# Patient Record
Sex: MALE | Race: WHITE | Employment: FULL TIME | URBAN - METROPOLITAN AREA
[De-identification: names, ages, dates, MRNs, and addresses within clinical notes are randomized per-mention and may not be internally consistent; named-entity substitution may affect disease eponyms.]

---

## 2017-03-03 ENCOUNTER — APPOINTMENT (OUTPATIENT)
Dept: GENERAL RADIOLOGY | Facility: HOSPITAL | Age: 47
End: 2017-03-03
Attending: EMERGENCY MEDICINE
Payer: COMMERCIAL

## 2017-03-03 ENCOUNTER — APPOINTMENT (OUTPATIENT)
Dept: CT IMAGING | Facility: HOSPITAL | Age: 47
End: 2017-03-03
Attending: EMERGENCY MEDICINE
Payer: COMMERCIAL

## 2017-03-03 ENCOUNTER — HOSPITAL ENCOUNTER (EMERGENCY)
Facility: HOSPITAL | Age: 47
Discharge: HOME | End: 2017-03-03
Attending: EMERGENCY MEDICINE
Payer: COMMERCIAL

## 2017-03-03 VITALS
BODY MASS INDEX: 30.8 KG/M2 | SYSTOLIC BLOOD PRESSURE: 122 MMHG | RESPIRATION RATE: 18 BRPM | DIASTOLIC BLOOD PRESSURE: 67 MMHG | WEIGHT: 220 LBS | TEMPERATURE: 99 F | HEIGHT: 71 IN | HEART RATE: 62 BPM | OXYGEN SATURATION: 96 %

## 2017-03-03 DIAGNOSIS — R10.9 ABDOMINAL PAIN OF UNKNOWN ETIOLOGY: Primary | ICD-10-CM

## 2017-03-03 DIAGNOSIS — K50.00 ILEITIS, TERMINAL, WITHOUT COMPLICATIONS (HCC): ICD-10-CM

## 2017-03-03 LAB
ANION GAP SERPL CALC-SCNC: 7 MMOL/L (ref 0–18)
BACTERIA UR QL AUTO: NEGATIVE /HPF
BASOPHILS # BLD: 0 K/UL (ref 0–0.2)
BASOPHILS NFR BLD: 1 %
BILIRUB UR QL: NEGATIVE
BUN SERPL-MCNC: 19 MG/DL (ref 8–20)
BUN/CREAT SERPL: 15.1 (ref 10–20)
CALCIUM SERPL-MCNC: 10.6 MG/DL (ref 8.5–10.5)
CHLORIDE SERPL-SCNC: 108 MMOL/L (ref 95–110)
CLARITY UR: CLEAR
CO2 SERPL-SCNC: 23 MMOL/L (ref 22–32)
COLOR UR: YELLOW
CREAT SERPL-MCNC: 1.26 MG/DL (ref 0.5–1.5)
EOSINOPHIL # BLD: 0.1 K/UL (ref 0–0.7)
EOSINOPHIL NFR BLD: 1 %
ERYTHROCYTE [DISTWIDTH] IN BLOOD BY AUTOMATED COUNT: 13 % (ref 11–15)
GLUCOSE SERPL-MCNC: 117 MG/DL (ref 70–99)
GLUCOSE UR-MCNC: NEGATIVE MG/DL
HCT VFR BLD AUTO: 45.9 % (ref 41–52)
HGB BLD-MCNC: 15.7 G/DL (ref 13.5–17.5)
HGB UR QL STRIP.AUTO: NEGATIVE
KETONES UR-MCNC: NEGATIVE MG/DL
LEUKOCYTE ESTERASE UR QL STRIP.AUTO: NEGATIVE
LYMPHOCYTES # BLD: 1.9 K/UL (ref 1–4)
LYMPHOCYTES NFR BLD: 21 %
MCH RBC QN AUTO: 29.3 PG (ref 27–32)
MCHC RBC AUTO-ENTMCNC: 34.3 G/DL (ref 32–37)
MCV RBC AUTO: 85.5 FL (ref 80–100)
MONOCYTES # BLD: 0.5 K/UL (ref 0–1)
MONOCYTES NFR BLD: 6 %
NEUTROPHILS # BLD AUTO: 6.5 K/UL (ref 1.8–7.7)
NEUTROPHILS NFR BLD: 72 %
NITRITE UR QL STRIP.AUTO: NEGATIVE
OSMOLALITY UR CALC.SUM OF ELEC: 289 MOSM/KG (ref 275–295)
PH UR: 5 [PH] (ref 5–8)
PLATELET # BLD AUTO: 200 K/UL (ref 140–400)
PMV BLD AUTO: 10.3 FL (ref 7.4–10.3)
POTASSIUM SERPL-SCNC: 4 MMOL/L (ref 3.3–5.1)
PROT UR-MCNC: NEGATIVE MG/DL
RBC # BLD AUTO: 5.37 M/UL (ref 4.5–5.9)
RBC #/AREA URNS AUTO: 3 /HPF
SODIUM SERPL-SCNC: 138 MMOL/L (ref 136–144)
SP GR UR STRIP: 1.02 (ref 1–1.03)
UROBILINOGEN UR STRIP-ACNC: <2
VIT C UR-MCNC: 40 MG/DL
WBC # BLD AUTO: 9 K/UL (ref 4–11)
WBC #/AREA URNS AUTO: 2 /HPF

## 2017-03-03 PROCEDURE — 85025 COMPLETE CBC W/AUTO DIFF WBC: CPT

## 2017-03-03 PROCEDURE — 74000 XR ABDOMEN (1 VIEW) (CPT=74000): CPT

## 2017-03-03 PROCEDURE — 96374 THER/PROPH/DIAG INJ IV PUSH: CPT

## 2017-03-03 PROCEDURE — 80048 BASIC METABOLIC PNL TOTAL CA: CPT

## 2017-03-03 PROCEDURE — 74177 CT ABD & PELVIS W/CONTRAST: CPT

## 2017-03-03 PROCEDURE — 99285 EMERGENCY DEPT VISIT HI MDM: CPT

## 2017-03-03 PROCEDURE — 81003 URINALYSIS AUTO W/O SCOPE: CPT | Performed by: EMERGENCY MEDICINE

## 2017-03-03 PROCEDURE — 96375 TX/PRO/DX INJ NEW DRUG ADDON: CPT

## 2017-03-03 RX ORDER — MORPHINE SULFATE 4 MG/ML
INJECTION, SOLUTION INTRAMUSCULAR; INTRAVENOUS
Status: COMPLETED
Start: 2017-03-03 | End: 2017-03-03

## 2017-03-03 RX ORDER — MORPHINE SULFATE 4 MG/ML
4 INJECTION, SOLUTION INTRAMUSCULAR; INTRAVENOUS ONCE
Status: COMPLETED | OUTPATIENT
Start: 2017-03-03 | End: 2017-03-03

## 2017-03-03 RX ORDER — ONDANSETRON 2 MG/ML
4 INJECTION INTRAMUSCULAR; INTRAVENOUS ONCE
Status: COMPLETED | OUTPATIENT
Start: 2017-03-03 | End: 2017-03-03

## 2017-03-03 RX ORDER — KETOROLAC TROMETHAMINE 30 MG/ML
15 INJECTION, SOLUTION INTRAMUSCULAR; INTRAVENOUS ONCE
Status: COMPLETED | OUTPATIENT
Start: 2017-03-03 | End: 2017-03-03

## 2017-03-03 RX ORDER — KETOROLAC TROMETHAMINE 30 MG/ML
INJECTION, SOLUTION INTRAMUSCULAR; INTRAVENOUS
Status: COMPLETED
Start: 2017-03-03 | End: 2017-03-03

## 2017-03-03 NOTE — ED PROVIDER NOTES
Patient Seen in: [facility] AND [facility] Emergency Department    History   Patient presents with:  Stomach Pain    Stated Complaint: stomach cramping since 1900     HPI    Patient is a 42-year-old male who awoke during the night with severe crampy abdominal pa Constitutional and vital signs reviewed. All other systems reviewed and negative except as noted above. PSFH elements reviewed from today and agreed except as otherwise stated in HPI.     Physical Exam     ED Triage Vitals   BP 03/03/17 0532 133/90 ---------                               -----------         ------                     CBC W/ DIFFERENTIAL[[id number]]                              Final result                 Please view results for these tests on the individual orders.    RAINBOW DRAW AMANDA

## 2017-03-03 NOTE — ED NOTES
Pt states his pain is better, waiting for MD up-date. Pt is awake and alert, family is at the bedside for emotional support.

## 2017-03-03 NOTE — ED NOTES
Patient discharged, patient instructed to follow up with the doctor, return if worse. Pt ambulate with steady gait.

## 2017-03-17 ENCOUNTER — OFFICE VISIT (OUTPATIENT)
Dept: FAMILY MEDICINE CLINIC | Facility: CLINIC | Age: 47
End: 2017-03-17

## 2017-03-17 VITALS
TEMPERATURE: 98 F | SYSTOLIC BLOOD PRESSURE: 133 MMHG | WEIGHT: 217 LBS | DIASTOLIC BLOOD PRESSURE: 86 MMHG | BODY MASS INDEX: 30 KG/M2 | HEART RATE: 80 BPM

## 2017-03-17 DIAGNOSIS — K52.9 COLITIS: Primary | ICD-10-CM

## 2017-03-17 PROCEDURE — 99212 OFFICE O/P EST SF 10 MIN: CPT | Performed by: FAMILY MEDICINE

## 2017-03-17 PROCEDURE — 99214 OFFICE O/P EST MOD 30 MIN: CPT | Performed by: FAMILY MEDICINE

## 2017-03-17 NOTE — PROGRESS NOTES
HPI:    Patient ID: Dae Mcneil is a 51year old male. HPI   Patient presents with:  ER F/U: f/u [facility] 3/3/17-Abdominal pain, Ileitis, terminal- Review CT scan    Review of Systems   Constitutional: Negative.     Gastrointestinal: Positive for abdom

## 2017-03-28 ENCOUNTER — OFFICE VISIT (OUTPATIENT)
Dept: FAMILY MEDICINE CLINIC | Facility: CLINIC | Age: 47
End: 2017-03-28

## 2017-03-28 VITALS
SYSTOLIC BLOOD PRESSURE: 125 MMHG | HEART RATE: 66 BPM | DIASTOLIC BLOOD PRESSURE: 85 MMHG | TEMPERATURE: 98 F | BODY MASS INDEX: 30 KG/M2 | WEIGHT: 217 LBS

## 2017-03-28 DIAGNOSIS — L02.01 ABSCESS OF FACE: Primary | ICD-10-CM

## 2017-03-28 PROCEDURE — 99212 OFFICE O/P EST SF 10 MIN: CPT | Performed by: FAMILY MEDICINE

## 2017-03-28 PROCEDURE — 99213 OFFICE O/P EST LOW 20 MIN: CPT | Performed by: FAMILY MEDICINE

## 2017-03-28 RX ORDER — CLINDAMYCIN HYDROCHLORIDE 300 MG/1
300 CAPSULE ORAL 3 TIMES DAILY
Qty: 21 CAPSULE | Refills: 0 | Status: SHIPPED | OUTPATIENT
Start: 2017-03-28 | End: 2017-07-12 | Stop reason: ALTCHOICE

## 2017-03-28 NOTE — PROGRESS NOTES
HPI:    Patient ID: Nghia Best is a 51year old male. HPI  Patient presents with:  Lump: lump on bottom of chin  some discharge. 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 Review of Systems   Constitutional: Negative. Skin: Positive for wound.         Inferior ch

## 2017-07-12 ENCOUNTER — TELEPHONE (OUTPATIENT)
Dept: GASTROENTEROLOGY | Facility: CLINIC | Age: 47
End: 2017-07-12

## 2017-07-12 ENCOUNTER — OFFICE VISIT (OUTPATIENT)
Dept: GASTROENTEROLOGY | Facility: CLINIC | Age: 47
End: 2017-07-12

## 2017-07-12 VITALS
HEART RATE: 58 BPM | WEIGHT: 218 LBS | BODY MASS INDEX: 30.52 KG/M2 | HEIGHT: 71 IN | SYSTOLIC BLOOD PRESSURE: 126 MMHG | DIASTOLIC BLOOD PRESSURE: 79 MMHG

## 2017-07-12 DIAGNOSIS — R93.3 ABNORMAL CT SCAN, GASTROINTESTINAL TRACT: ICD-10-CM

## 2017-07-12 DIAGNOSIS — R10.30 LOWER ABDOMINAL PAIN: Primary | ICD-10-CM

## 2017-07-12 PROCEDURE — 99244 OFF/OP CNSLTJ NEW/EST MOD 40: CPT | Performed by: INTERNAL MEDICINE

## 2017-07-12 PROCEDURE — 99212 OFFICE O/P EST SF 10 MIN: CPT | Performed by: INTERNAL MEDICINE

## 2017-07-12 NOTE — TELEPHONE ENCOUNTER
Scheduled for:  Colon/EGD   Provider Name: Dr. Cat Mijares  Date:  9-22-17  Location:  [facility]  Sedation:  MAC  Time:  7:30am, arrival 6:30am  Prep: Suprep   Meds/Allergies Reconciled?:  Yes   Diagnosis with codes:  Abdominal pain R10.9, abnormal CT scan R93.8  Was

## 2017-07-12 NOTE — PROGRESS NOTES
Stefan Lion is a 51year old male. HPI:   Patient presents with:  Abdominal Pain: a few months back had to go to ER for the third time       The patient is a 26-year-old male who presents for evaluation of abdominal pain and abnormal CT scan.   The pa (cause of death, age 46)   • Heart Disease Maternal Uncle      CAD      Social History: Smoking status: Current Every Day Smoker                                                   Packs/day: 0.50      Years: 20.00        Types: Cigarettes  Smokeless tobacco evaluation were reviewed. I have advised colonoscopy and EGD. Risks and benefits the procedure outlined with the patient including risk of perforation, bleeding, missed lesion and medication reaction. Suprep or Colyte bowel preparation and MAC sedation.

## 2017-09-20 ENCOUNTER — HOSPITAL ENCOUNTER (EMERGENCY)
Facility: HOSPITAL | Age: 47
Discharge: HOME | End: 2017-09-20
Attending: EMERGENCY MEDICINE
Payer: COMMERCIAL

## 2017-09-20 ENCOUNTER — NURSE TRIAGE (OUTPATIENT)
Dept: OTHER | Age: 47
End: 2017-09-20

## 2017-09-20 ENCOUNTER — APPOINTMENT (OUTPATIENT)
Dept: CT IMAGING | Facility: HOSPITAL | Age: 47
End: 2017-09-20
Attending: EMERGENCY MEDICINE
Payer: COMMERCIAL

## 2017-09-20 VITALS
HEART RATE: 58 BPM | SYSTOLIC BLOOD PRESSURE: 129 MMHG | DIASTOLIC BLOOD PRESSURE: 78 MMHG | TEMPERATURE: 98 F | RESPIRATION RATE: 18 BRPM | WEIGHT: 215 LBS | BODY MASS INDEX: 30.1 KG/M2 | HEIGHT: 71 IN | OXYGEN SATURATION: 99 %

## 2017-09-20 DIAGNOSIS — M54.50 ACUTE LEFT-SIDED LOW BACK PAIN WITHOUT SCIATICA: Primary | ICD-10-CM

## 2017-09-20 DIAGNOSIS — R10.32 ABDOMINAL PAIN, LEFT LOWER QUADRANT: ICD-10-CM

## 2017-09-20 LAB
ANION GAP SERPL CALC-SCNC: 5 MMOL/L (ref 0–18)
BASOPHILS # BLD: 0 K/UL (ref 0–0.2)
BASOPHILS NFR BLD: 0 %
BILIRUB UR QL: NEGATIVE
BUN SERPL-MCNC: 16 MG/DL (ref 8–20)
BUN/CREAT SERPL: 14.3 (ref 10–20)
CALCIUM SERPL-MCNC: 9.2 MG/DL (ref 8.5–10.5)
CHLORIDE SERPL-SCNC: 107 MMOL/L (ref 95–110)
CLARITY UR: CLEAR
CO2 SERPL-SCNC: 24 MMOL/L (ref 22–32)
COLOR UR: YELLOW
CREAT SERPL-MCNC: 1.12 MG/DL (ref 0.5–1.5)
EOSINOPHIL # BLD: 0 K/UL (ref 0–0.7)
EOSINOPHIL NFR BLD: 1 %
ERYTHROCYTE [DISTWIDTH] IN BLOOD BY AUTOMATED COUNT: 12.8 % (ref 11–15)
GLUCOSE SERPL-MCNC: 97 MG/DL (ref 70–99)
GLUCOSE UR-MCNC: NEGATIVE MG/DL
HCT VFR BLD AUTO: 39.7 % (ref 41–52)
HGB BLD-MCNC: 13.7 G/DL (ref 13.5–17.5)
HGB UR QL STRIP.AUTO: NEGATIVE
KETONES UR-MCNC: NEGATIVE MG/DL
LEUKOCYTE ESTERASE UR QL STRIP.AUTO: NEGATIVE
LYMPHOCYTES # BLD: 1.2 K/UL (ref 1–4)
LYMPHOCYTES NFR BLD: 18 %
MCH RBC QN AUTO: 29.8 PG (ref 27–32)
MCHC RBC AUTO-ENTMCNC: 34.6 G/DL (ref 32–37)
MCV RBC AUTO: 86.2 FL (ref 80–100)
MONOCYTES # BLD: 0.3 K/UL (ref 0–1)
MONOCYTES NFR BLD: 4 %
NEUTROPHILS # BLD AUTO: 4.9 K/UL (ref 1.8–7.7)
NEUTROPHILS NFR BLD: 77 %
NITRITE UR QL STRIP.AUTO: NEGATIVE
OSMOLALITY UR CALC.SUM OF ELEC: 283 MOSM/KG (ref 275–295)
PH UR: 6 [PH] (ref 5–8)
PLATELET # BLD AUTO: 184 K/UL (ref 140–400)
PMV BLD AUTO: 9.4 FL (ref 7.4–10.3)
POTASSIUM SERPL-SCNC: 4.5 MMOL/L (ref 3.3–5.1)
PROT UR-MCNC: NEGATIVE MG/DL
RBC # BLD AUTO: 4.6 M/UL (ref 4.5–5.9)
SODIUM SERPL-SCNC: 136 MMOL/L (ref 136–144)
SP GR UR STRIP: 1 (ref 1–1.03)
UROBILINOGEN UR STRIP-ACNC: <2
VIT C UR-MCNC: NEGATIVE MG/DL
WBC # BLD AUTO: 6.5 K/UL (ref 4–11)

## 2017-09-20 PROCEDURE — 81003 URINALYSIS AUTO W/O SCOPE: CPT | Performed by: EMERGENCY MEDICINE

## 2017-09-20 PROCEDURE — 96376 TX/PRO/DX INJ SAME DRUG ADON: CPT

## 2017-09-20 PROCEDURE — 85025 COMPLETE CBC W/AUTO DIFF WBC: CPT

## 2017-09-20 PROCEDURE — 96374 THER/PROPH/DIAG INJ IV PUSH: CPT

## 2017-09-20 PROCEDURE — 80048 BASIC METABOLIC PNL TOTAL CA: CPT | Performed by: EMERGENCY MEDICINE

## 2017-09-20 PROCEDURE — 74177 CT ABD & PELVIS W/CONTRAST: CPT | Performed by: EMERGENCY MEDICINE

## 2017-09-20 PROCEDURE — 80048 BASIC METABOLIC PNL TOTAL CA: CPT

## 2017-09-20 PROCEDURE — 85025 COMPLETE CBC W/AUTO DIFF WBC: CPT | Performed by: EMERGENCY MEDICINE

## 2017-09-20 PROCEDURE — 99285 EMERGENCY DEPT VISIT HI MDM: CPT

## 2017-09-20 PROCEDURE — 96361 HYDRATE IV INFUSION ADD-ON: CPT

## 2017-09-20 RX ORDER — HYDROMORPHONE HYDROCHLORIDE 1 MG/ML
0.5 INJECTION, SOLUTION INTRAMUSCULAR; INTRAVENOUS; SUBCUTANEOUS ONCE
Status: COMPLETED | OUTPATIENT
Start: 2017-09-20 | End: 2017-09-20

## 2017-09-20 NOTE — ED NOTES
Patient given discharge papers, states he was angry \"No one is doing anything about my back, I have not had any images of my back done and Im in a lot of pain. \" Dr. Treva Browning made aware and spoke with patient.

## 2017-09-20 NOTE — ED NOTES
Patient complains left flank pain that radiates down to left groin area that started Friday 09/15/17. Denies N/V/D or constipation. Per pt, standing improves pain, sitting down amplifies it. Denies fever. Denies blood in urine or stool.  Denies any other co

## 2017-09-20 NOTE — ED PROVIDER NOTES
Patient Seen in: [facility] AND [facility] Emergency Department    History   Patient presents with:  Abdomen/Flank Pain (GI/)    Stated Complaint: left lower abdominal pain    HPI    65-year-old male with history of hypertension and hyperlipidemia presents wit and vital signs reviewed. All other systems reviewed and negative except as noted above. PSFH elements reviewed from today and agreed except as otherwise stated in HPI.     Physical Exam   ED Triage Vitals [09/20/17 1230]  BP: 139/76  Pulse: 73  Res CBC W/ DIFFERENTIAL[[id number]]          Abnormal            Final result                 Please view results for these tests on the individual orders.    URINALYSIS WITH CULTURE REFLEX   RAINBOW DRAW BLUE   RAINBOW LIME GREEN   RAINBOW DRAW GOLD

## 2017-09-20 NOTE — TELEPHONE ENCOUNTER
Patient went to [facility] ER today for low back pain, but was not prescribed anything for pain. Spouse wanted to make an appointment with Dr Geanie Cooks for tomorrow to follow-up. No appointments available with Dr Geanie Cooks tomorrow.  Appointment made for tomorrow at

## 2017-09-20 NOTE — ED INITIAL ASSESSMENT (HPI)
Pt reports llq pain since Friday and states that he has a colonoscopy for the 22nd and cannot tolerate the pain.  Last b,m was today and normal and no issues with urination

## 2017-09-20 NOTE — ED NOTES
Per pt, has hx of spinal fusion November 2016 at [facility]. Denies trauma/fall. Patient states that pain also radiates down left leg. Denies numbness and tingling.

## 2017-09-21 ENCOUNTER — OFFICE VISIT (OUTPATIENT)
Dept: FAMILY MEDICINE CLINIC | Facility: CLINIC | Age: 47
End: 2017-09-21

## 2017-09-21 VITALS
TEMPERATURE: 98 F | HEART RATE: 70 BPM | RESPIRATION RATE: 18 BRPM | DIASTOLIC BLOOD PRESSURE: 86 MMHG | SYSTOLIC BLOOD PRESSURE: 133 MMHG | HEIGHT: 71 IN | WEIGHT: 212 LBS | BODY MASS INDEX: 29.68 KG/M2

## 2017-09-21 DIAGNOSIS — M54.32 SCIATICA OF LEFT SIDE: ICD-10-CM

## 2017-09-21 DIAGNOSIS — M54.50 DORSALGIA OF LUMBAR REGION: ICD-10-CM

## 2017-09-21 PROCEDURE — 99213 OFFICE O/P EST LOW 20 MIN: CPT | Performed by: FAMILY MEDICINE

## 2017-09-21 PROCEDURE — 99212 OFFICE O/P EST SF 10 MIN: CPT | Performed by: FAMILY MEDICINE

## 2017-09-21 RX ORDER — FENOFIBRATE 145 MG/1
145 TABLET, COATED ORAL
COMMUNITY
Start: 2017-08-26 | End: 2017-09-21

## 2017-09-21 RX ORDER — LISINOPRIL 10 MG/1
10 TABLET ORAL
COMMUNITY
End: 2017-09-21

## 2017-09-21 RX ORDER — HYDROCODONE BITARTRATE AND ACETAMINOPHEN 5; 325 MG/1; MG/1
1 TABLET ORAL EVERY 6 HOURS PRN
Qty: 30 TABLET | Refills: 0 | Status: SHIPPED | OUTPATIENT
Start: 2017-09-21 | End: 2017-10-09

## 2017-09-21 RX ORDER — METHYLPREDNISOLONE 4 MG/1
TABLET ORAL
Qty: 1 KIT | Refills: 0 | Status: SHIPPED | OUTPATIENT
Start: 2017-09-21 | End: 2017-10-09

## 2017-09-21 NOTE — PROGRESS NOTES
HPI:    Patient ID: Bossman Garcia is a 51year old male. Pt presents for follow up from the ER for low back pain and groin pain. Pt has hx of back surgery. Was diagnosed with sciactica. Pt had CT scan which was unremarkable.  Patient states symptoms are NextGen:  \"arthroscopy x3 necrotizing                infection right knee\"       Family History  Problem Relation Age of Onset  • Colon Cancer Maternal Grandmother 48  • Diabetes Maternal Uncle    • Heart Disease Maternal Aunt        CAD, (cause of death region.   Psychiatric: patient is oriented X 3, there is no agitation     DIFFERENTIAL DIAGNOSIS: After history and physical exam differential diagnosis was considered for ureteral stone, diverticulitis, urinary tract infection, muscular skeletal pain       drive on medication. Disp: 30 tablet Rfl: 0   methylPREDNISolone (MEDROL) 4 MG Oral Tablet Therapy Pack As directed.  Disp: 1 kit Rfl: 0   Na Sulfate-K Sulfate-Mg Sulf (SUPREP BOWEL PREP KIT) 17.5-3.13-1.6 GM/180ML Oral Solution Take as directed Disp: 1 Bot

## 2017-09-22 ENCOUNTER — ANESTHESIA (OUTPATIENT)
Dept: ENDOSCOPY | Facility: HOSPITAL | Age: 47
End: 2017-09-22
Payer: COMMERCIAL

## 2017-09-22 ENCOUNTER — SURGERY (OUTPATIENT)
Age: 47
End: 2017-09-22

## 2017-09-22 ENCOUNTER — ANESTHESIA EVENT (OUTPATIENT)
Dept: ENDOSCOPY | Facility: HOSPITAL | Age: 47
End: 2017-09-22
Payer: COMMERCIAL

## 2017-09-22 ENCOUNTER — HOSPITAL ENCOUNTER (OUTPATIENT)
Facility: HOSPITAL | Age: 47
Setting detail: HOSPITAL OUTPATIENT SURGERY
Discharge: HOME | End: 2017-09-22
Attending: INTERNAL MEDICINE | Admitting: INTERNAL MEDICINE
Payer: COMMERCIAL

## 2017-09-22 VITALS
HEART RATE: 58 BPM | DIASTOLIC BLOOD PRESSURE: 81 MMHG | WEIGHT: 215 LBS | BODY MASS INDEX: 30.1 KG/M2 | HEIGHT: 71 IN | RESPIRATION RATE: 15 BRPM | SYSTOLIC BLOOD PRESSURE: 115 MMHG | OXYGEN SATURATION: 97 %

## 2017-09-22 DIAGNOSIS — R10.9 ABDOMINAL PAIN: ICD-10-CM

## 2017-09-22 DIAGNOSIS — R93.89 ABNORMAL CT SCAN: ICD-10-CM

## 2017-09-22 DIAGNOSIS — K44.9 HIATAL HERNIA: ICD-10-CM

## 2017-09-22 DIAGNOSIS — K64.9 HEMORRHOIDS, UNSPECIFIED HEMORRHOID TYPE: Primary | ICD-10-CM

## 2017-09-22 DIAGNOSIS — K63.5 POLYP OF COLON, UNSPECIFIED PART OF COLON, UNSPECIFIED TYPE: ICD-10-CM

## 2017-09-22 PROCEDURE — 0DB98ZX EXCISION OF DUODENUM, VIA NATURAL OR ARTIFICIAL OPENING ENDOSCOPIC, DIAGNOSTIC: ICD-10-PCS | Performed by: INTERNAL MEDICINE

## 2017-09-22 PROCEDURE — 45385 COLONOSCOPY W/LESION REMOVAL: CPT | Performed by: INTERNAL MEDICINE

## 2017-09-22 PROCEDURE — 0DBH8ZX EXCISION OF CECUM, VIA NATURAL OR ARTIFICIAL OPENING ENDOSCOPIC, DIAGNOSTIC: ICD-10-PCS | Performed by: INTERNAL MEDICINE

## 2017-09-22 PROCEDURE — 0DB38ZX EXCISION OF LOWER ESOPHAGUS, VIA NATURAL OR ARTIFICIAL OPENING ENDOSCOPIC, DIAGNOSTIC: ICD-10-PCS | Performed by: INTERNAL MEDICINE

## 2017-09-22 PROCEDURE — 0DBN8ZX EXCISION OF SIGMOID COLON, VIA NATURAL OR ARTIFICIAL OPENING ENDOSCOPIC, DIAGNOSTIC: ICD-10-PCS | Performed by: INTERNAL MEDICINE

## 2017-09-22 PROCEDURE — 43239 EGD BIOPSY SINGLE/MULTIPLE: CPT | Performed by: INTERNAL MEDICINE

## 2017-09-22 RX ORDER — MIDAZOLAM HYDROCHLORIDE 1 MG/ML
INJECTION INTRAMUSCULAR; INTRAVENOUS AS NEEDED
Status: DISCONTINUED | OUTPATIENT
Start: 2017-09-22 | End: 2017-09-22 | Stop reason: SURG

## 2017-09-22 RX ORDER — SODIUM CHLORIDE, SODIUM LACTATE, POTASSIUM CHLORIDE, CALCIUM CHLORIDE 600; 310; 30; 20 MG/100ML; MG/100ML; MG/100ML; MG/100ML
INJECTION, SOLUTION INTRAVENOUS CONTINUOUS
Status: DISCONTINUED | OUTPATIENT
Start: 2017-09-22 | End: 2017-09-22

## 2017-09-22 RX ORDER — NALOXONE HYDROCHLORIDE 0.4 MG/ML
80 INJECTION, SOLUTION INTRAMUSCULAR; INTRAVENOUS; SUBCUTANEOUS AS NEEDED
Status: DISCONTINUED | OUTPATIENT
Start: 2017-09-22 | End: 2017-09-22

## 2017-09-22 RX ORDER — LIDOCAINE HYDROCHLORIDE 10 MG/ML
INJECTION, SOLUTION EPIDURAL; INFILTRATION; INTRACAUDAL; PERINEURAL AS NEEDED
Status: DISCONTINUED | OUTPATIENT
Start: 2017-09-22 | End: 2017-09-22 | Stop reason: SURG

## 2017-09-22 RX ADMIN — MIDAZOLAM HYDROCHLORIDE 2 MG: 1 INJECTION INTRAMUSCULAR; INTRAVENOUS at 07:34:00

## 2017-09-22 RX ADMIN — LIDOCAINE HYDROCHLORIDE 50 MG: 10 INJECTION, SOLUTION EPIDURAL; INFILTRATION; INTRACAUDAL; PERINEURAL at 07:34:00

## 2017-09-22 RX ADMIN — SODIUM CHLORIDE, SODIUM LACTATE, POTASSIUM CHLORIDE, CALCIUM CHLORIDE: 600; 310; 30; 20 INJECTION, SOLUTION INTRAVENOUS at 08:07:00

## 2017-09-22 RX ADMIN — SODIUM CHLORIDE, SODIUM LACTATE, POTASSIUM CHLORIDE, CALCIUM CHLORIDE: 600; 310; 30; 20 INJECTION, SOLUTION INTRAVENOUS at 07:32:00

## 2017-09-22 NOTE — H&P
History & Physical Examination    Patient Name: Maximiliano Yang  MRN: [id number]  [facility]: [id number]  YOB: 1970    Diagnosis: abnormal ct scan, abdominal pain        Prescriptions Prior to Admission:  HYDROcodone-acetaminophen ([address]) 5-325 MG O Heart Disease Maternal Aunt      CAD, (cause of death, age 46)   • Heart Disease Maternal Uncle      CAD        Smoking status: Current Every Day Smoker  0.50 Packs/day  For 20.00 Years     Types: Cigarettes    Smokeless tobacco: Former User    Alcohol use

## 2017-09-22 NOTE — ANESTHESIA POSTPROCEDURE EVALUATION
Patient: Yusuf Montes De Oca    Procedure Summary     Date:  09/22/17 Room / Location:  [address] [place] [address] ENDOSCOPY 01 / [address] [place] [address] ENDOSCOPY    Anesthesia Start:  0732 Anesthesia Stop:      Procedures:       ESOPHAGOGASTRODUODENOSCOPY (EGD) (N/A )      COLONOSCOPY (N/A ) Diagnosis

## 2017-09-22 NOTE — ANESTHESIA PREPROCEDURE EVALUATION
Anesthesia PreOp Note    HPI:     Ahmet Garcia is a 51year old male who presents for preoperative consultation requested by: Elly Rendon MD    Date of Surgery: 9/22/2017    Procedure(s):  ESOPHAGOGASTRODUODENOSCOPY (EGD)  COLONOSCOPY  Indication: 1 Bottle Rfl: 0 9/22/2017 at 0130   Fenofibrate 150 MG Oral Cap Take 1 capsule by mouth once daily. Disp: 90 capsule Rfl: 3 9/21/2017 at 0700   lisinopril 20 MG Oral Tab Take 1 tablet (20 mg total) by mouth once daily.  Disp: 90 tablet Rfl: 3 9/21/2017 at 0 16 09/20/2017   CREATSERUM 1.12 09/20/2017   GLU 97 09/20/2017   CA 9.2 09/20/2017          Vital Signs: Body mass index is 29.99 kg/m². height is 5' 11\" and weight is 215 lb. His blood pressure is 112/73 and his pulse is 73.  His respiration is 16 and

## 2017-09-22 NOTE — OPERATIVE REPORT
CHIARA QUINTANILLA [facility] Endoscopy Report      Preoperative Diagnosis:  - abnormal CT scan  - abdominal pain      Postoperative Diagnosis:  - colon polyps   - internal hemorrhoids  - small hiatal hernia  - reflux esophagitis      Procedure:    Colonoscop analysis. On retroflexed view small internal hemorrhoids were noted. EGD  The esophagus showed mild irregularity of zline c/w esophagitis/reflux. Bx taken.   The GE junction and diaphragmatic impression were at 39 cm and 40 cm for a 1 cm hiatal hernia

## 2017-10-09 ENCOUNTER — OFFICE VISIT (OUTPATIENT)
Dept: FAMILY MEDICINE CLINIC | Facility: CLINIC | Age: 47
End: 2017-10-09

## 2017-10-09 VITALS
HEIGHT: 71 IN | TEMPERATURE: 98 F | RESPIRATION RATE: 18 BRPM | HEART RATE: 69 BPM | BODY MASS INDEX: 29.68 KG/M2 | DIASTOLIC BLOOD PRESSURE: 81 MMHG | SYSTOLIC BLOOD PRESSURE: 126 MMHG | WEIGHT: 212 LBS

## 2017-10-09 DIAGNOSIS — J01.90 ACUTE SINUSITIS, RECURRENCE NOT SPECIFIED, UNSPECIFIED LOCATION: Primary | ICD-10-CM

## 2017-10-09 PROCEDURE — 99212 OFFICE O/P EST SF 10 MIN: CPT | Performed by: FAMILY MEDICINE

## 2017-10-09 PROCEDURE — 99213 OFFICE O/P EST LOW 20 MIN: CPT | Performed by: FAMILY MEDICINE

## 2017-10-09 RX ORDER — AMOXICILLIN AND CLAVULANATE POTASSIUM 875; 125 MG/1; MG/1
1 TABLET, FILM COATED ORAL 2 TIMES DAILY
Qty: 20 TABLET | Refills: 0 | Status: ON HOLD | OUTPATIENT
Start: 2017-10-09 | End: 2017-11-18 | Stop reason: ALTCHOICE

## 2017-10-09 NOTE — PROGRESS NOTES
HPI:    Patient ID: Esme Plata is a 51year old male. Pt presents with cold symptoms for 2 weeks days. Pt has had cough, chest congestion, scratchy sore throat. No fevers. Pt has tried otc remedies without relief. Pt states no sick contacts. Sig: Take 1 tablet by mouth 2 (two) times daily.            Imaging & Referrals:  None       EQ#[id number]

## 2017-10-12 ENCOUNTER — TELEPHONE (OUTPATIENT)
Dept: GASTROENTEROLOGY | Facility: CLINIC | Age: 47
End: 2017-10-12

## 2017-10-12 NOTE — TELEPHONE ENCOUNTER
----- Message from Joesph Cogan, MD sent at 10/11/2017  5:51 PM CDT -----  I wanted to get back to you with your colonoscopy/EGD results. You had 6 colon polyps removed which were benign.   I would advise a repeat colonoscopy in 5 years to make sure no

## 2017-11-18 ENCOUNTER — APPOINTMENT (OUTPATIENT)
Dept: MRI IMAGING | Facility: HOSPITAL | Age: 47
DRG: 473 | End: 2017-11-18
Attending: EMERGENCY MEDICINE
Payer: COMMERCIAL

## 2017-11-18 ENCOUNTER — APPOINTMENT (OUTPATIENT)
Dept: CT IMAGING | Facility: HOSPITAL | Age: 47
DRG: 473 | End: 2017-11-18
Attending: EMERGENCY MEDICINE
Payer: COMMERCIAL

## 2017-11-18 ENCOUNTER — APPOINTMENT (OUTPATIENT)
Dept: GENERAL RADIOLOGY | Facility: HOSPITAL | Age: 47
DRG: 473 | End: 2017-11-18
Attending: EMERGENCY MEDICINE
Payer: COMMERCIAL

## 2017-11-18 ENCOUNTER — HOSPITAL ENCOUNTER (INPATIENT)
Facility: HOSPITAL | Age: 47
LOS: 4 days | Discharge: HOME | DRG: 473 | End: 2017-11-22
Attending: EMERGENCY MEDICINE | Admitting: HOSPITALIST
Payer: COMMERCIAL

## 2017-11-18 DIAGNOSIS — M48.02 CERVICAL STENOSIS OF SPINAL CANAL: ICD-10-CM

## 2017-11-18 DIAGNOSIS — I21.4 NSTEMI (NON-ST ELEVATED MYOCARDIAL INFARCTION) (HCC): Primary | ICD-10-CM

## 2017-11-18 DIAGNOSIS — M54.12 CERVICAL RADICULOPATHY: ICD-10-CM

## 2017-11-18 PROCEDURE — 72141 MRI NECK SPINE W/O DYE: CPT | Performed by: EMERGENCY MEDICINE

## 2017-11-18 PROCEDURE — 99223 1ST HOSP IP/OBS HIGH 75: CPT | Performed by: HOSPITALIST

## 2017-11-18 PROCEDURE — 71010 XR CHEST AP PORTABLE  (CPT=71010): CPT | Performed by: EMERGENCY MEDICINE

## 2017-11-18 PROCEDURE — 74160 CT ABDOMEN W/CONTRAST: CPT | Performed by: EMERGENCY MEDICINE

## 2017-11-18 PROCEDURE — 71260 CT THORAX DX C+: CPT | Performed by: EMERGENCY MEDICINE

## 2017-11-18 RX ORDER — POLYETHYLENE GLYCOL 3350 17 G/17G
17 POWDER, FOR SOLUTION ORAL DAILY PRN
Status: DISCONTINUED | OUTPATIENT
Start: 2017-11-18 | End: 2017-11-22

## 2017-11-18 RX ORDER — HYDROMORPHONE HYDROCHLORIDE 1 MG/ML
0.2 INJECTION, SOLUTION INTRAMUSCULAR; INTRAVENOUS; SUBCUTANEOUS EVERY 2 HOUR PRN
Status: DISCONTINUED | OUTPATIENT
Start: 2017-11-18 | End: 2017-11-22

## 2017-11-18 RX ORDER — LORAZEPAM 2 MG/ML
1 INJECTION INTRAMUSCULAR ONCE
Status: COMPLETED | OUTPATIENT
Start: 2017-11-18 | End: 2017-11-18

## 2017-11-18 RX ORDER — LISINOPRIL 20 MG/1
20 TABLET ORAL
Status: DISCONTINUED | OUTPATIENT
Start: 2017-11-19 | End: 2017-11-22

## 2017-11-18 RX ORDER — HYDROCODONE BITARTRATE AND ACETAMINOPHEN 5; 325 MG/1; MG/1
1 TABLET ORAL EVERY 4 HOURS PRN
Status: DISCONTINUED | OUTPATIENT
Start: 2017-11-18 | End: 2017-11-19

## 2017-11-18 RX ORDER — HYDROMORPHONE HYDROCHLORIDE 1 MG/ML
0.4 INJECTION, SOLUTION INTRAMUSCULAR; INTRAVENOUS; SUBCUTANEOUS EVERY 2 HOUR PRN
Status: DISCONTINUED | OUTPATIENT
Start: 2017-11-18 | End: 2017-11-22

## 2017-11-18 RX ORDER — DIAZEPAM 5 MG/ML
5 INJECTION, SOLUTION INTRAMUSCULAR; INTRAVENOUS ONCE
Status: DISCONTINUED | OUTPATIENT
Start: 2017-11-18 | End: 2017-11-18

## 2017-11-18 RX ORDER — FENOFIBRATE 134 MG/1
134 CAPSULE ORAL
Status: DISCONTINUED | OUTPATIENT
Start: 2017-11-19 | End: 2017-11-22

## 2017-11-18 RX ORDER — DOCUSATE SODIUM 100 MG/1
100 CAPSULE, LIQUID FILLED ORAL 2 TIMES DAILY
Status: DISCONTINUED | OUTPATIENT
Start: 2017-11-18 | End: 2017-11-22

## 2017-11-18 RX ORDER — DEXAMETHASONE SODIUM PHOSPHATE 4 MG/ML
10 VIAL (ML) INJECTION ONCE
Status: COMPLETED | OUTPATIENT
Start: 2017-11-18 | End: 2017-11-18

## 2017-11-18 RX ORDER — KETOROLAC TROMETHAMINE 30 MG/ML
15 INJECTION, SOLUTION INTRAMUSCULAR; INTRAVENOUS ONCE
Status: COMPLETED | OUTPATIENT
Start: 2017-11-18 | End: 2017-11-18

## 2017-11-18 RX ORDER — MORPHINE SULFATE 2 MG/ML
2 INJECTION, SOLUTION INTRAMUSCULAR; INTRAVENOUS EVERY 4 HOURS PRN
Status: DISCONTINUED | OUTPATIENT
Start: 2017-11-18 | End: 2017-11-22

## 2017-11-18 RX ORDER — SODIUM PHOSPHATE, DIBASIC AND SODIUM PHOSPHATE, MONOBASIC 7; 19 G/133ML; G/133ML
1 ENEMA RECTAL ONCE AS NEEDED
Status: DISCONTINUED | OUTPATIENT
Start: 2017-11-18 | End: 2017-11-22

## 2017-11-18 RX ORDER — HYDROMORPHONE HYDROCHLORIDE 1 MG/ML
0.8 INJECTION, SOLUTION INTRAMUSCULAR; INTRAVENOUS; SUBCUTANEOUS EVERY 2 HOUR PRN
Status: DISCONTINUED | OUTPATIENT
Start: 2017-11-18 | End: 2017-11-22

## 2017-11-18 RX ORDER — LORAZEPAM 2 MG/ML
INJECTION INTRAMUSCULAR
Status: COMPLETED
Start: 2017-11-18 | End: 2017-11-18

## 2017-11-18 RX ORDER — DEXAMETHASONE SODIUM PHOSPHATE 4 MG/ML
4 VIAL (ML) INJECTION EVERY 8 HOURS
Status: DISCONTINUED | OUTPATIENT
Start: 2017-11-18 | End: 2017-11-19

## 2017-11-18 RX ORDER — BISACODYL 10 MG
10 SUPPOSITORY, RECTAL RECTAL
Status: DISCONTINUED | OUTPATIENT
Start: 2017-11-18 | End: 2017-11-22

## 2017-11-18 RX ORDER — 0.9 % SODIUM CHLORIDE 0.9 %
VIAL (ML) INJECTION
Status: COMPLETED
Start: 2017-11-18 | End: 2017-11-18

## 2017-11-18 RX ORDER — ASPIRIN 81 MG/1
324 TABLET, CHEWABLE ORAL ONCE
Status: COMPLETED | OUTPATIENT
Start: 2017-11-18 | End: 2017-11-18

## 2017-11-18 RX ORDER — SODIUM CHLORIDE 0.9 % (FLUSH) 0.9 %
3 SYRINGE (ML) INJECTION AS NEEDED
Status: DISCONTINUED | OUTPATIENT
Start: 2017-11-18 | End: 2017-11-22

## 2017-11-18 RX ORDER — ORPHENADRINE CITRATE 30 MG/ML
30 INJECTION INTRAMUSCULAR; INTRAVENOUS EVERY 12 HOURS
Status: DISCONTINUED | OUTPATIENT
Start: 2017-11-18 | End: 2017-11-22

## 2017-11-18 RX ORDER — KETOROLAC TROMETHAMINE 30 MG/ML
INJECTION, SOLUTION INTRAMUSCULAR; INTRAVENOUS
Status: DISPENSED
Start: 2017-11-18 | End: 2017-11-18

## 2017-11-18 RX ORDER — MORPHINE SULFATE 4 MG/ML
INJECTION, SOLUTION INTRAMUSCULAR; INTRAVENOUS
Status: COMPLETED
Start: 2017-11-18 | End: 2017-11-18

## 2017-11-18 RX ORDER — ONDANSETRON 2 MG/ML
4 INJECTION INTRAMUSCULAR; INTRAVENOUS EVERY 6 HOURS PRN
Status: DISCONTINUED | OUTPATIENT
Start: 2017-11-18 | End: 2017-11-22

## 2017-11-18 RX ORDER — DEXAMETHASONE SODIUM PHOSPHATE 10 MG/ML
INJECTION, SOLUTION INTRAMUSCULAR; INTRAVENOUS
Status: DISPENSED
Start: 2017-11-18 | End: 2017-11-18

## 2017-11-18 RX ORDER — ACETAMINOPHEN 325 MG/1
650 TABLET ORAL EVERY 4 HOURS PRN
Status: DISCONTINUED | OUTPATIENT
Start: 2017-11-18 | End: 2017-11-19

## 2017-11-18 RX ORDER — HYDROCODONE BITARTRATE AND ACETAMINOPHEN 5; 325 MG/1; MG/1
2 TABLET ORAL EVERY 4 HOURS PRN
Status: DISCONTINUED | OUTPATIENT
Start: 2017-11-18 | End: 2017-11-19

## 2017-11-18 RX ORDER — 0.9 % SODIUM CHLORIDE 0.9 %
VIAL (ML) INJECTION
Status: DISPENSED
Start: 2017-11-18 | End: 2017-11-19

## 2017-11-18 RX ORDER — MORPHINE SULFATE 4 MG/ML
4 INJECTION, SOLUTION INTRAMUSCULAR; INTRAVENOUS ONCE
Status: COMPLETED | OUTPATIENT
Start: 2017-11-18 | End: 2017-11-18

## 2017-11-18 NOTE — PLAN OF CARE
Patient/Family Goals    • Patient/Family Long Term Goal Progressing    • Patient/Family Short Term Goal Progressing          A/Ox3. C/O pain in neck, shoulder, and arm. Morphine 2mg PRN as needed for pain. VSS. Family at bedside.  Education provided about o

## 2017-11-18 NOTE — ED PROVIDER NOTES
Patient Seen in: [facility] AND [facility] Emergency Department    History   Patient presents with:  Neck Pain (musculoskeletal, neurologic)    Stated Complaint:     HPI    39-year-old male presents for complaint of left-sided neck pain for the past several days Comment: per NextGen:  \"arthroscopy x3 necrotizing                infection right knee\"        Smoking status: Current Every Day Smoker                                                   Packs/day: 1.00      Years: 20.00        Types: Cigarettes  Smok Abdominal: Soft. Normal appearance. He exhibits no abdominal bruit. There is no tenderness. Musculoskeletal: Normal range of motion. Cervical back: He exhibits tenderness.         Back:    Neurological: He is alert and oriented to person, place, an Reading: No STEMI, interpreted by ED physician    Rate, intervals and axes as noted on EKG Report.   Rate: 50  Rhythm: Sinus Rhythm  Reading: Bradycardia, no STEMI, interpreted by ED physician       ED Course as of Nov 18 1136  ----------------------------- PROCEDURE: CT CHEST ABDOMEN (ALL CONTRAST ONLY) (CPT=71260/24561)  COMPARISON: [facility], CT ABDOMEN + PELVIS (CONTRAST ONLY) (CPT=74177), 9/20/2017, 16:32.   INDICATIONS: chest pain radiating to back  TECHNIQUE: CT images of the chest and 11/18/2017 at 10:18          CONCLUSION: 1. No aortic aneurysm or dissection. 2. No acute pulmonary embolism. 3. No evidence of pulmonary disease.         Xr Chest Ap Portable  (cpt=71010)    Result Date: 11/18/2017  PROCEDURE: XR CHEST AP PORTABLE (CPT=710

## 2017-11-18 NOTE — CONSULTS
[place] [facility]    Cardiology Consultation    Jeanmarie Situ Patient Status:  Inpatient    1970 MRN [id number]   Location [facility] 3W/SW Attending Eliazar Yeh MD   [facility] Day # 0 PCP Douglas Last DO     2017  Laura \"arthroscopy x3 necrotizing                infection right knee\"  Family History   Problem Relation Age of Onset   • Colon Cancer Maternal Grandmother 48   • Diabetes Maternal Uncle    • Heart Disease Maternal Aunt      CAD, (cause of death, age 46)   • S1, S2 normal. No murmur, pericardial rub, S3.  Lungs: Clear without wheezes, rales, rhonchi or dullness. Normal excursions and effort. Abdomen: Soft, non-tender. BS-present. Extremities: Without clubbing, cyanosis or edema. Peripheral pulses are 2+. mild-mod/right mild foraminal stenosis     bilateral L5-S1 radiculopathies     Tobacco use     NSTEMI (non-ST elevated myocardial infarction) ([facility]     Cervical radiculopathy          Recommendations:  Problem #1 neck pain radiating to left arm.   Symptoms

## 2017-11-18 NOTE — ED NOTES
Pt c/o neck pain and L arm numbness with \"shooting\" down to fingertips. Denies fever, n/v/d, no s/s respiratory distress. Pt reports congestion also. Difficulty getting comfortable upon assessment and EKG.  Seated currently on side of cart with elbows on

## 2017-11-18 NOTE — H&P
Kasia [address] Patient Status:  Inpatient    1970 MRN [id number]   Location [facility] 3W/SW Attending Angelique Martin MD   Hosp Day # 0 LUKASZ Zee DO     Date:  2017  [place] Types: Cigarettes  Smokeless tobacco: Former User                     Alcohol use: Yes           0.6 oz/week     Cans of beer: 1 per week     Comment: 2 beers, weekly    Allergies/Medications:    Allergies: No Known Allergies    Home medications    Prescrip protrusion. There is mild central stenosis and severe left foraminal narrowing. Ct Chest+abdomen (all Cntrst Only) (cpt=71260/13089)    Result Date: 11/18/2017  CONCLUSION: 1. No aortic aneurysm or dissection. 2. No acute pulmonary embolism.  3. No nikos

## 2017-11-19 ENCOUNTER — APPOINTMENT (OUTPATIENT)
Dept: GENERAL RADIOLOGY | Facility: HOSPITAL | Age: 47
DRG: 473 | End: 2017-11-19
Attending: NEUROLOGICAL SURGERY
Payer: COMMERCIAL

## 2017-11-19 PROCEDURE — 72040 X-RAY EXAM NECK SPINE 2-3 VW: CPT | Performed by: NEUROLOGICAL SURGERY

## 2017-11-19 PROCEDURE — 99233 SBSQ HOSP IP/OBS HIGH 50: CPT | Performed by: HOSPITALIST

## 2017-11-19 RX ORDER — DEXAMETHASONE SODIUM PHOSPHATE 4 MG/ML
8 VIAL (ML) INJECTION EVERY 6 HOURS
Status: DISCONTINUED | OUTPATIENT
Start: 2017-11-19 | End: 2017-11-21

## 2017-11-19 RX ORDER — 0.9 % SODIUM CHLORIDE 0.9 %
VIAL (ML) INJECTION
Status: COMPLETED
Start: 2017-11-19 | End: 2017-11-19

## 2017-11-19 RX ORDER — GABAPENTIN 300 MG/1
300 CAPSULE ORAL 3 TIMES DAILY
Status: DISCONTINUED | OUTPATIENT
Start: 2017-11-19 | End: 2017-11-22

## 2017-11-19 RX ORDER — HYDROCODONE BITARTRATE AND ACETAMINOPHEN 10; 325 MG/1; MG/1
2 TABLET ORAL EVERY 6 HOURS PRN
Status: DISCONTINUED | OUTPATIENT
Start: 2017-11-19 | End: 2017-11-22

## 2017-11-19 RX ORDER — DEXAMETHASONE SODIUM PHOSPHATE 4 MG/ML
4 VIAL (ML) INJECTION ONCE
Status: COMPLETED | OUTPATIENT
Start: 2017-11-19 | End: 2017-11-19

## 2017-11-19 RX ORDER — ZOLPIDEM TARTRATE 5 MG/1
5 TABLET ORAL NIGHTLY PRN
Status: DISCONTINUED | OUTPATIENT
Start: 2017-11-19 | End: 2017-11-22

## 2017-11-19 NOTE — PROGRESS NOTES
[facility]    Progress Note    Nghia Best Patient Status:  Inpatient    1970 MRN [id number]   Location [facility] 3W/SW Attending Noe Kothari MD   Hosp Day # 1 PCP Dominic Hoffmann DO       Subjective:     Continued 11/18/2017  CONCLUSION: No acute cardiopulmonary abnormality.        Ekg 12-lead    Result Date: 11/18/2017  ECG Report  Interpretation  -------------------------- Sinus Bradycardia WITHIN NORMAL LIMITS When compared with ECG of 11/18/2017 08:41:21 No signi

## 2017-11-19 NOTE — PLAN OF CARE
Patient/Family Goals    • Patient/Family Long Term Goal Progressing    • Patient/Family Short Term Goal Progressing          AOx3, VSS. Norco and dilaudid given regularly for pain. Decadron given per orders.  Bed locked/lowest position, call light and belon

## 2017-11-19 NOTE — CONSULTS
Neurosurgery Consult Note    _______________________ PATIENT HISTORY _______________________    CC: Left arm pain and weakness    HPI: A neurosurgery consult was requested by Dr Conrad Bhakta. (11/19/17):  The patient is a 54yo who began complaining of severe History  None on file     Social History Main Topics   Smoking status: Current Every Day Smoker  1.00 Packs/day  For 20.00 Years     Types: Cigarettes    Smokeless tobacco: Former User    Alcohol use Yes  0.6 oz/week    1 Cans of beer per week         Comm and MUSCLE/TENDONS  LOCATION Insp. Perc. Palp.  ROM Stability Tone   Head/Neck OK OK OK OK OK OK   Spine/Ribs/Pelvis OK OK OK OK OK OK   Left arm OK OK OK OK OK OK   Right arm OK OK OK OK OK OK   Left leg OK OK OK OK OK OK   Right leg OK OK OK OK OK OK significant pain and if this does not improve with these measures, I have told him we can consider surgery and have discussed an ACDF C56, 67, C7T1.

## 2017-11-20 PROCEDURE — 99233 SBSQ HOSP IP/OBS HIGH 50: CPT | Performed by: HOSPITALIST

## 2017-11-20 RX ORDER — CALCIUM CARBONATE 200(500)MG
1000 TABLET,CHEWABLE ORAL EVERY 6 HOURS PRN
Status: DISCONTINUED | OUTPATIENT
Start: 2017-11-20 | End: 2017-11-22

## 2017-11-20 RX ORDER — 0.9 % SODIUM CHLORIDE 0.9 %
VIAL (ML) INJECTION
Status: DISPENSED
Start: 2017-11-20 | End: 2017-11-20

## 2017-11-20 NOTE — CHRONIC PAIN
[facility]  Report of Consultation    Poornima Shermanmoises Patient Status:  Inpatient    1970 MRN [id number]   Location [facility] 4W/SW/SE Attending Angelique Martin MD   Hosp Day # 1 PCP Fiona Zee DO     Date of Admission Uncle      CAD      reports that he has been smoking Cigarettes. He has a 20.00 pack-year smoking history. He has quit using smokeless tobacco. He reports that he drinks about 0.6 oz of alcohol per week . He reports that he does not use drugs.     Allergmagdi pressure 142/70, pulse 68, temperature 97.8 °F (36.6 °C), temperature source Oral, resp. rate 18, height 5' 11\" (1.803 m), weight 209 lb 9.6 oz (95.1 kg), SpO2 94 %.   ROM in neck limited flexion to the left due to pain, mildly limited ROM in left shoulder

## 2017-11-20 NOTE — PROGRESS NOTES
[facility]    Progress Note    Stefan Hendrixr Patient Status:  Inpatient    1970 MRN [id number]   Location [facility] 4W/SW/SE Attending Alisa Andino MD   Hosp Day # 2 PCP Tiffany Husain DO       Subjective:     Pt not Assessment and Plan:     Left arm and chest pain.   Cervical radiculopathy as seen on MRI.  Pt has several areas of spinal stenosis and a left disc bulge at C7-T1. Not cardiac.  Cards has seen pt.   The elevated troponins were erroneous and have

## 2017-11-20 NOTE — PROGRESS NOTES
S: Continued left arm pain    O:    11/20/17  0515   BP: 133/72   Pulse: 71   Resp: 18   Temp: 97.7 °F (36.5 °C)       Motor LUE 5/5 delt, 4+/5 biceps, triceps, hand    A/P    The patient continues to have severe pain despite steroids and narcotics.   I twila

## 2017-11-21 ENCOUNTER — APPOINTMENT (OUTPATIENT)
Dept: GENERAL RADIOLOGY | Facility: HOSPITAL | Age: 47
DRG: 473 | End: 2017-11-21
Attending: NEUROLOGICAL SURGERY
Payer: COMMERCIAL

## 2017-11-21 ENCOUNTER — ANESTHESIA EVENT (OUTPATIENT)
Dept: SURGERY | Facility: HOSPITAL | Age: 47
DRG: 473 | End: 2017-11-21
Payer: COMMERCIAL

## 2017-11-21 ENCOUNTER — SURGERY (OUTPATIENT)
Age: 47
End: 2017-11-21

## 2017-11-21 ENCOUNTER — ANESTHESIA (OUTPATIENT)
Dept: SURGERY | Facility: HOSPITAL | Age: 47
DRG: 473 | End: 2017-11-21
Payer: COMMERCIAL

## 2017-11-21 PROCEDURE — 0RB50ZZ EXCISION OF CERVICOTHORACIC VERTEBRAL DISC, OPEN APPROACH: ICD-10-PCS | Performed by: NEUROLOGICAL SURGERY

## 2017-11-21 PROCEDURE — 76001 XR C-ARM FLUORO >1 HOUR  (CPT=76001): CPT | Performed by: NEUROLOGICAL SURGERY

## 2017-11-21 PROCEDURE — 0RG20A0 FUSION OF 2 OR MORE CERVICAL VERTEBRAL JOINTS WITH INTERBODY FUSION DEVICE, ANTERIOR APPROACH, ANTERIOR COLUMN, OPEN APPROACH: ICD-10-PCS | Performed by: NEUROLOGICAL SURGERY

## 2017-11-21 PROCEDURE — 99232 SBSQ HOSP IP/OBS MODERATE 35: CPT | Performed by: HOSPITALIST

## 2017-11-21 PROCEDURE — 72040 X-RAY EXAM NECK SPINE 2-3 VW: CPT | Performed by: NEUROLOGICAL SURGERY

## 2017-11-21 PROCEDURE — 0RG40A0 FUSION OF CERVICOTHORACIC VERTEBRAL JOINT WITH INTERBODY FUSION DEVICE, ANTERIOR APPROACH, ANTERIOR COLUMN, OPEN APPROACH: ICD-10-PCS | Performed by: NEUROLOGICAL SURGERY

## 2017-11-21 PROCEDURE — 01N10ZZ RELEASE CERVICAL NERVE, OPEN APPROACH: ICD-10-PCS | Performed by: NEUROLOGICAL SURGERY

## 2017-11-21 PROCEDURE — 0RB30ZZ EXCISION OF CERVICAL VERTEBRAL DISC, OPEN APPROACH: ICD-10-PCS | Performed by: NEUROLOGICAL SURGERY

## 2017-11-21 DEVICE — COROENT ACR MRKR 8X17X14 10DEG: Type: IMPLANTABLE DEVICE | Status: FUNCTIONAL

## 2017-11-21 DEVICE — OSTEOCEL PRO BONE MATRIX SM: Type: IMPLANTABLE DEVICE | Status: FUNCTIONAL

## 2017-11-21 DEVICE — IMPLANTABLE DEVICE: Type: IMPLANTABLE DEVICE | Status: FUNCTIONAL

## 2017-11-21 DEVICE — ARCHON SCRW 4.0X15 SLF-TP VAR: Type: IMPLANTABLE DEVICE | Status: FUNCTIONAL

## 2017-11-21 RX ORDER — SODIUM CHLORIDE 9 MG/ML
INJECTION, SOLUTION INTRAVENOUS CONTINUOUS
Status: DISCONTINUED | OUTPATIENT
Start: 2017-11-21 | End: 2017-11-22

## 2017-11-21 RX ORDER — HYDROCODONE BITARTRATE AND ACETAMINOPHEN 5; 325 MG/1; MG/1
1 TABLET ORAL AS NEEDED
Status: DISCONTINUED | OUTPATIENT
Start: 2017-11-21 | End: 2017-11-21 | Stop reason: HOSPADM

## 2017-11-21 RX ORDER — SODIUM CHLORIDE 9 MG/ML
INJECTION, SOLUTION INTRAVENOUS
Status: COMPLETED
Start: 2017-11-21 | End: 2017-11-21

## 2017-11-21 RX ORDER — SODIUM CHLORIDE, SODIUM LACTATE, POTASSIUM CHLORIDE, CALCIUM CHLORIDE 600; 310; 30; 20 MG/100ML; MG/100ML; MG/100ML; MG/100ML
INJECTION, SOLUTION INTRAVENOUS CONTINUOUS PRN
Status: DISCONTINUED | OUTPATIENT
Start: 2017-11-21 | End: 2017-11-21 | Stop reason: SURG

## 2017-11-21 RX ORDER — MORPHINE SULFATE 10 MG/ML
6 INJECTION, SOLUTION INTRAMUSCULAR; INTRAVENOUS EVERY 10 MIN PRN
Status: DISCONTINUED | OUTPATIENT
Start: 2017-11-21 | End: 2017-11-21 | Stop reason: HOSPADM

## 2017-11-21 RX ORDER — LIDOCAINE HYDROCHLORIDE 10 MG/ML
INJECTION, SOLUTION EPIDURAL; INFILTRATION; INTRACAUDAL; PERINEURAL AS NEEDED
Status: DISCONTINUED | OUTPATIENT
Start: 2017-11-21 | End: 2017-11-21 | Stop reason: SURG

## 2017-11-21 RX ORDER — MIDAZOLAM HYDROCHLORIDE 1 MG/ML
INJECTION INTRAMUSCULAR; INTRAVENOUS AS NEEDED
Status: DISCONTINUED | OUTPATIENT
Start: 2017-11-21 | End: 2017-11-21 | Stop reason: SURG

## 2017-11-21 RX ORDER — DEXAMETHASONE SODIUM PHOSPHATE 4 MG/ML
4 VIAL (ML) INJECTION EVERY 12 HOURS
Status: DISCONTINUED | OUTPATIENT
Start: 2017-11-22 | End: 2017-11-22

## 2017-11-21 RX ORDER — GLYCOPYRROLATE 0.2 MG/ML
INJECTION INTRAMUSCULAR; INTRAVENOUS AS NEEDED
Status: DISCONTINUED | OUTPATIENT
Start: 2017-11-21 | End: 2017-11-21 | Stop reason: SURG

## 2017-11-21 RX ORDER — NALOXONE HYDROCHLORIDE 0.4 MG/ML
80 INJECTION, SOLUTION INTRAMUSCULAR; INTRAVENOUS; SUBCUTANEOUS AS NEEDED
Status: DISCONTINUED | OUTPATIENT
Start: 2017-11-21 | End: 2017-11-21 | Stop reason: HOSPADM

## 2017-11-21 RX ORDER — ONDANSETRON 2 MG/ML
4 INJECTION INTRAMUSCULAR; INTRAVENOUS ONCE AS NEEDED
Status: DISCONTINUED | OUTPATIENT
Start: 2017-11-21 | End: 2017-11-21 | Stop reason: HOSPADM

## 2017-11-21 RX ORDER — HYDROMORPHONE HYDROCHLORIDE 1 MG/ML
0.6 INJECTION, SOLUTION INTRAMUSCULAR; INTRAVENOUS; SUBCUTANEOUS EVERY 5 MIN PRN
Status: DISCONTINUED | OUTPATIENT
Start: 2017-11-21 | End: 2017-11-21 | Stop reason: HOSPADM

## 2017-11-21 RX ORDER — MORPHINE SULFATE 4 MG/ML
4 INJECTION, SOLUTION INTRAMUSCULAR; INTRAVENOUS EVERY 10 MIN PRN
Status: DISCONTINUED | OUTPATIENT
Start: 2017-11-21 | End: 2017-11-21 | Stop reason: HOSPADM

## 2017-11-21 RX ORDER — HYDROCODONE BITARTRATE AND ACETAMINOPHEN 5; 325 MG/1; MG/1
2 TABLET ORAL AS NEEDED
Status: DISCONTINUED | OUTPATIENT
Start: 2017-11-21 | End: 2017-11-21 | Stop reason: HOSPADM

## 2017-11-21 RX ORDER — NEOSTIGMINE METHYLSULFATE 0.5 MG/ML
INJECTION INTRAVENOUS AS NEEDED
Status: DISCONTINUED | OUTPATIENT
Start: 2017-11-21 | End: 2017-11-21 | Stop reason: SURG

## 2017-11-21 RX ORDER — HYDROMORPHONE HYDROCHLORIDE 1 MG/ML
0.4 INJECTION, SOLUTION INTRAMUSCULAR; INTRAVENOUS; SUBCUTANEOUS EVERY 5 MIN PRN
Status: DISCONTINUED | OUTPATIENT
Start: 2017-11-21 | End: 2017-11-21 | Stop reason: HOSPADM

## 2017-11-21 RX ORDER — HYDROMORPHONE HYDROCHLORIDE 1 MG/ML
0.2 INJECTION, SOLUTION INTRAMUSCULAR; INTRAVENOUS; SUBCUTANEOUS EVERY 5 MIN PRN
Status: DISCONTINUED | OUTPATIENT
Start: 2017-11-21 | End: 2017-11-21 | Stop reason: HOSPADM

## 2017-11-21 RX ORDER — MORPHINE SULFATE 2 MG/ML
2 INJECTION, SOLUTION INTRAMUSCULAR; INTRAVENOUS EVERY 10 MIN PRN
Status: DISCONTINUED | OUTPATIENT
Start: 2017-11-21 | End: 2017-11-21 | Stop reason: HOSPADM

## 2017-11-21 RX ORDER — HALOPERIDOL 5 MG/ML
0.25 INJECTION INTRAMUSCULAR ONCE AS NEEDED
Status: DISCONTINUED | OUTPATIENT
Start: 2017-11-21 | End: 2017-11-21 | Stop reason: HOSPADM

## 2017-11-21 RX ORDER — ONDANSETRON 2 MG/ML
INJECTION INTRAMUSCULAR; INTRAVENOUS AS NEEDED
Status: DISCONTINUED | OUTPATIENT
Start: 2017-11-21 | End: 2017-11-21 | Stop reason: SURG

## 2017-11-21 RX ORDER — SODIUM CHLORIDE, SODIUM LACTATE, POTASSIUM CHLORIDE, CALCIUM CHLORIDE 600; 310; 30; 20 MG/100ML; MG/100ML; MG/100ML; MG/100ML
INJECTION, SOLUTION INTRAVENOUS CONTINUOUS
Status: DISCONTINUED | OUTPATIENT
Start: 2017-11-21 | End: 2017-11-22

## 2017-11-21 RX ORDER — ROCURONIUM BROMIDE 10 MG/ML
INJECTION, SOLUTION INTRAVENOUS AS NEEDED
Status: DISCONTINUED | OUTPATIENT
Start: 2017-11-21 | End: 2017-11-21 | Stop reason: SURG

## 2017-11-21 RX ADMIN — ROCURONIUM BROMIDE 20 MG: 10 INJECTION, SOLUTION INTRAVENOUS at 13:09:00

## 2017-11-21 RX ADMIN — GLYCOPYRROLATE 0.8 MG: 0.2 INJECTION INTRAMUSCULAR; INTRAVENOUS at 14:44:00

## 2017-11-21 RX ADMIN — MIDAZOLAM HYDROCHLORIDE 2 MG: 1 INJECTION INTRAMUSCULAR; INTRAVENOUS at 12:05:00

## 2017-11-21 RX ADMIN — ONDANSETRON 4 MG: 2 INJECTION INTRAMUSCULAR; INTRAVENOUS at 12:15:00

## 2017-11-21 RX ADMIN — DEXAMETHASONE SODIUM PHOSPHATE 4 MG: 4 MG/ML VIAL (ML) INJECTION at 12:15:00

## 2017-11-21 RX ADMIN — ROCURONIUM BROMIDE 50 MG: 10 INJECTION, SOLUTION INTRAVENOUS at 12:05:00

## 2017-11-21 RX ADMIN — ROCURONIUM BROMIDE 10 MG: 10 INJECTION, SOLUTION INTRAVENOUS at 14:03:00

## 2017-11-21 RX ADMIN — NEOSTIGMINE METHYLSULFATE 5 MG: 0.5 INJECTION INTRAVENOUS at 14:44:00

## 2017-11-21 RX ADMIN — LIDOCAINE HYDROCHLORIDE 40 MG: 10 INJECTION, SOLUTION EPIDURAL; INFILTRATION; INTRACAUDAL; PERINEURAL at 12:05:00

## 2017-11-21 RX ADMIN — SODIUM CHLORIDE, SODIUM LACTATE, POTASSIUM CHLORIDE, CALCIUM CHLORIDE: 600; 310; 30; 20 INJECTION, SOLUTION INTRAVENOUS at 14:57:00

## 2017-11-21 RX ADMIN — SODIUM CHLORIDE, SODIUM LACTATE, POTASSIUM CHLORIDE, CALCIUM CHLORIDE: 600; 310; 30; 20 INJECTION, SOLUTION INTRAVENOUS at 12:05:00

## 2017-11-21 NOTE — ANESTHESIA POSTPROCEDURE EVALUATION
Patient: Afua Powers    Procedure Summary     Date:  11/21/17 Room / Location:  [address] [place] [address] MAIN OR 09 / [facility] MAIN OR    Anesthesia Start:  2278 Anesthesia Stop:      Procedure:  ANTERIOR CERVICAL FUSION BG & INST 1 LEVEL (N/A ) Diagnosis:  (cervical stenosis)

## 2017-11-21 NOTE — PROGRESS NOTES
[facility] - [facility]  Inpatient Pain Management Progress Note      Patient name: Zena Diallo 51year old male  : 1970  MRN: [id number]    Diagnosis: NSTEMI (non-ST elevated myocardial infarction) ([facility]  (primary encounter diagnosis)  Cerv

## 2017-11-21 NOTE — OPERATIVE REPORT
NEUROSURGERY OPERATIVE NOTE    Surgery Date: 5/15/2016  Hospital: [facility]  Patient Name: Hedy Reed  Patient MR#: [id number]  Surgeon: Dr. Ravi Mauro  Co-Surgeon: None  Assistant: None    Anesthesia:  GETA  Length: 2 hours  Antibiotics: IV  Specimen OF OPERATION    Informed Consent: Prior to bringing the patient to surgery, the potential risks and benefits were discussed with the patient.   Specifically, the risks of injury to the anterior cervical structures including the trachea, esophagus, carotid a overlying C7-T1 disc space as identified by C-arm fluoroscopy. Further skin dissection was made using the Bovie electrocautery to expose the underlying fat layer.   Metzenbaum scissors was used to bluntly dissect through the fat layer to expose the platysm [address] retractor was placed under the edges of the longus colli muscles. Anterior Discectomy and Fusion:     Next, A [place] distraction pin was placed into the C5 and C6 vertebral bodies.   A #15 scalpel was used to incise the anterior disc annulus a [place]-type retractor was then used to engage gentle distraction between the two pins at C6 and C7.   A #15 scalpel was used to incise the anterior disc annulus around its periphery and then various curettes and rongeurs were used to grossly remove the ante and rongeurs were used to grossly remove the anterior annulus and debulk the disc space of disc material.  The microscope was then brought in for illumination and magnification.   A [place] drill with a matchstick nereida was used to removed the remainder of was secured into place using the provided screws. Placement of the plate and screws were confirmed using AP and lateral C-arm fluoroscopy. Closure:  The Ste[place] retractor was removed and then handheld Cloward retractors were used to inspect the surgical

## 2017-11-21 NOTE — ANESTHESIA PREPROCEDURE EVALUATION
Anesthesia PreOp Note    HPI:     Jose Daniel Braga is a 51year old male who presents for preoperative consultation requested by: Adriana Allison MD    Date of Surgery: 11/18/2017 - 11/21/2017    Procedure(s):  ANTERIOR CERVICAL FUSION BG & INST 1 LEVEL  Indic daily. Disp: 90 tablet Rfl: 3 11/17/2017 at Unknown time       Current Facility-Administered Medications Ordered in Epic:  0.9%  NaCl infusion  Intravenous Continuous Cade Garnica MD Last Rate: 83 mL/hr at 11/21/17 0751   [MAR Hold] CeFAZolin Sodium (A 11/21/17 0859   [MAR Hold] PEG 3350 (MIRALAX) powder packet 17 g 17 g Oral Daily PRN Cade Alexander MD 17 g at 11/19/17 1712   [MAR Hold] magnesium hydroxide (MILK OF MAGNESIA) 400 MG/5ML suspension 30 mL 30 mL Oral Daily PRN Justin Lui MD 30 mL at HGB 14.0 11/19/2017   HCT 41.6 11/19/2017   MCV 86.3 11/19/2017   MCH 29.0 11/19/2017   MCHC 33.6 11/19/2017   RDW 12.9 11/19/2017    11/19/2017   MPV 9.7 11/19/2017       Lab Results  Component Value Date    11/19/2017   K 4.4 11/19/2017

## 2017-11-21 NOTE — PROGRESS NOTES
[facility]    Progress Note    Dimitri Calloway Patient Status:  Inpatient    1970 MRN [id number]   Location [facility] 4W/SW/SE Attending Sancho Yo MD   Hosp Day # 3 PCP Tatiana Martinez DO       Subjective:     Pt juana have been corrected.   Chest pain is musculoskeletal.  - cards consulted  - no further w/u     Hx of HTN  - cont home lisinopril     Hx of hypertriglyceridemia  - cont fenofibrate     Tobacco abuse - counseled on cessation     dvt proph - low risk, ambulati

## 2017-11-22 VITALS
DIASTOLIC BLOOD PRESSURE: 84 MMHG | HEIGHT: 71 IN | SYSTOLIC BLOOD PRESSURE: 148 MMHG | WEIGHT: 209.63 LBS | TEMPERATURE: 99 F | BODY MASS INDEX: 29.35 KG/M2 | HEART RATE: 82 BPM | OXYGEN SATURATION: 91 % | RESPIRATION RATE: 18 BRPM

## 2017-11-22 PROCEDURE — 99239 HOSP IP/OBS DSCHRG MGMT >30: CPT | Performed by: HOSPITALIST

## 2017-11-22 RX ORDER — METHOCARBAMOL 750 MG/1
750 TABLET, FILM COATED ORAL 3 TIMES DAILY PRN
Qty: 90 TABLET | Refills: 0 | Status: SHIPPED | OUTPATIENT
Start: 2017-11-22 | End: 2019-02-21

## 2017-11-22 RX ORDER — DIAZEPAM 5 MG/1
5 TABLET ORAL EVERY 6 HOURS PRN
Status: DISCONTINUED | OUTPATIENT
Start: 2017-11-22 | End: 2017-11-22

## 2017-11-22 RX ORDER — METHOCARBAMOL 750 MG/1
750 TABLET, FILM COATED ORAL 3 TIMES DAILY
Status: DISCONTINUED | OUTPATIENT
Start: 2017-11-22 | End: 2017-11-22

## 2017-11-22 RX ORDER — DIAZEPAM 5 MG/1
5 TABLET ORAL EVERY 6 HOURS PRN
Qty: 20 TABLET | Refills: 0 | Status: SHIPPED | OUTPATIENT
Start: 2017-11-22 | End: 2018-05-14

## 2017-11-22 RX ORDER — AMOXICILLIN 250 MG
1 CAPSULE ORAL 2 TIMES DAILY
Qty: 30 TABLET | Refills: 0 | Status: SHIPPED | OUTPATIENT
Start: 2017-11-22 | End: 2018-05-14 | Stop reason: CLARIF

## 2017-11-22 RX ORDER — HYDROCODONE BITARTRATE AND ACETAMINOPHEN 10; 325 MG/1; MG/1
1-2 TABLET ORAL EVERY 6 HOURS PRN
Qty: 30 TABLET | Refills: 0 | Status: SHIPPED | OUTPATIENT
Start: 2017-11-22 | End: 2018-05-14

## 2017-11-22 NOTE — PLAN OF CARE
DISCHARGE PLANNING    • Discharge to home or other facility with appropriate resources Progressing    KALI PLANS TO D/C HOME WHEN CLEARED AND PAIN CONTROLLED       GASTROINTESTINAL - ADULT    • Minimal or absence of nausea and vomiting Progressing    KALI POTTER

## 2017-11-22 NOTE — DISCHARGE SUMMARY
[place] [facility] - [facility]    Discharge Summary    Lucinda Borjas Patient Status:  Inpatient    1970 MRN [id number]   Location [facility] 4W/SW/SE Attending No att. providers found   Hosp Day # 4 PCP Imtiaz Valdivia, DO     Date of Admiss below:    Left arm and chest pain.   Cervical radiculopathy as seen on MRI.  Pt has several areas of spinal stenosis and a left disc bulge at C7-T1. Not cardiac.  Cards saw pt. The elevated troponins were erroneous and have been corrected.   CT chest is n Prescription details   Fenofibrate 150 MG Caps      Take 1 capsule by mouth once daily. Quantity:  90 capsule  Refills:  3     lisinopril 20 MG Tabs  Commonly known as:  PRINIVIL,ZESTRIL      Take 1 tablet (20 mg total) by mouth once daily.    Quantity:

## 2017-11-22 NOTE — PROGRESS NOTES
[address] [place] [address] Patient Status:  Inpatient    1970 MRN [id number]   Location [facility] 4W/SW/SE Attending Nancy Rodriguez MD   Hosp Day # 4 PCP Tye Posada DO       S:  No A/P:  Postop day 1 status post C5-6, C6-7, C7-T1 anterior cervical discectomy and fusion  Reports improvement in left upper extremity symptoms but has residual paresthesias  Neuro exam with improvement  Case discussed with Dr. Earl Greenberg per Neurosurgery

## 2017-11-24 ENCOUNTER — PATIENT OUTREACH (OUTPATIENT)
Dept: CASE MANAGEMENT | Age: 47
End: 2017-11-24

## 2017-11-24 ENCOUNTER — TELEPHONE (OUTPATIENT)
Dept: INTERNAL MEDICINE UNIT | Facility: HOSPITAL | Age: 47
End: 2017-11-24

## 2017-11-24 NOTE — PROGRESS NOTES
Initial Post Discharge Follow Up   Discharge Date: 11/22/17  Contact Date: 11/24/2017    Consent Verification:  Assessment Completed With: Patient  HIPAA Verified? Yes    Discharge Dx:   Left disc bulge at C7-T1. Status post C5-6, C6-7, C7-T1 anterior ce needed for Anxiety. Disp: 20 tablet Rfl: 0   Fenofibrate 150 MG Oral Cap Take 1 capsule by mouth once daily. Disp: 90 capsule Rfl: 3   lisinopril 20 MG Oral Tab Take 1 tablet (20 mg total) by mouth once daily.  Disp: 90 tablet Rfl: 3     • When you were truong [id number]  [phone]          PCP TCM/HFU appointment: scheduled at D/C within 7-14 days  yes   NCM Reviewed/scheduled/rescheduled PCP TCM/HFU appointment with pt:  Yes  Have you made all of your follow up appointments? no  Is there any reason as to wh Discharge Summary, Discharge medications reviewed/discussed/and reconciled with patient, and orders reviewed and discussed. Any changes or updates to medications and or orders sent to PCP.

## 2017-11-25 ENCOUNTER — HOSPITAL ENCOUNTER (OUTPATIENT)
Age: 47
Discharge: HOME | End: 2017-11-25
Attending: FAMILY MEDICINE
Payer: COMMERCIAL

## 2017-11-25 VITALS
OXYGEN SATURATION: 96 % | DIASTOLIC BLOOD PRESSURE: 82 MMHG | HEART RATE: 82 BPM | TEMPERATURE: 98 F | BODY MASS INDEX: 30 KG/M2 | SYSTOLIC BLOOD PRESSURE: 124 MMHG | RESPIRATION RATE: 16 BRPM | WEIGHT: 215 LBS

## 2017-11-25 DIAGNOSIS — M54.2 NECK PAIN: Primary | ICD-10-CM

## 2017-11-25 PROCEDURE — 99214 OFFICE O/P EST MOD 30 MIN: CPT

## 2017-11-25 PROCEDURE — 99213 OFFICE O/P EST LOW 20 MIN: CPT

## 2017-11-25 RX ORDER — HYDROCODONE BITARTRATE AND ACETAMINOPHEN 10; 325 MG/1; MG/1
1 TABLET ORAL EVERY 6 HOURS PRN
Qty: 8 TABLET | Refills: 0 | Status: SHIPPED | OUTPATIENT
Start: 2017-11-25 | End: 2017-11-27

## 2017-11-25 NOTE — ED PROVIDER NOTES
Patient Seen in: [facility] Immediate Care In [address] [place] [address]    History   Patient presents with:  Medication Administration    Stated Complaint: pain    HPI   Patient here for refill for pain medication.     Patient had a spinal fusion surgery on 21st. above.    Physical Exam   ED Triage Vitals [11/25/17 1500]  BP: 124/82  Pulse: 82  Resp: 16  Temp: 98.4 °F (36.9 °C)  Temp src: Oral  SpO2: 96 %  O2 Device: None (Room air)    Current:/82   Pulse 82   Temp 98.4 °F (36.9 °C) (Oral)   Resp 16   Wt 97. 5

## 2017-11-25 NOTE — ED INITIAL ASSESSMENT (HPI)
Patient needs refill on Norco 10-325mg, and valium 5mg.  Patient had surgery fusion in neck 11/21/2018

## 2017-11-27 ENCOUNTER — OFFICE VISIT (OUTPATIENT)
Dept: FAMILY MEDICINE CLINIC | Facility: CLINIC | Age: 47
End: 2017-11-27

## 2017-11-27 ENCOUNTER — HOSPITAL ENCOUNTER (OUTPATIENT)
Dept: GENERAL RADIOLOGY | Facility: HOSPITAL | Age: 47
Discharge: HOME | End: 2017-11-27
Attending: NEUROLOGICAL SURGERY
Payer: COMMERCIAL

## 2017-11-27 VITALS
SYSTOLIC BLOOD PRESSURE: 108 MMHG | BODY MASS INDEX: 28 KG/M2 | DIASTOLIC BLOOD PRESSURE: 75 MMHG | HEART RATE: 80 BPM | TEMPERATURE: 98 F | WEIGHT: 204 LBS

## 2017-11-27 DIAGNOSIS — M54.12 CERVICAL RADICULOPATHY: Primary | ICD-10-CM

## 2017-11-27 DIAGNOSIS — R07.89 STERNAL PAIN: ICD-10-CM

## 2017-11-27 PROCEDURE — 71020 XR CHEST PA + LAT CHEST (CPT=71020): CPT | Performed by: NEUROLOGICAL SURGERY

## 2017-11-27 PROCEDURE — 99495 TRANSJ CARE MGMT MOD F2F 14D: CPT | Performed by: FAMILY MEDICINE

## 2017-11-27 RX ORDER — DIAZEPAM 5 MG/1
TABLET ORAL
Qty: 20 TABLET | Refills: 0 | OUTPATIENT
Start: 2017-11-27

## 2017-11-27 NOTE — PAYOR COMM NOTE
INPATIENT ADMIT ON 11/18  D/C'D ON 11/22    ADMISSION REVIEW     Payor: [employer] #:  [id number]  Authorization Number: [id number]    Admit date: 11/18/17  Admit time: 65       Admitting Physician: Marcelo Rivera MD  Attending Physician:  Nicolette • Esophageal reflux    • Essential hypertension    • Headache     per NextGen:  \"Headaches\"   • High blood pressure    • High cholesterol    • Hyperlipidemia    • MRSA infection     per NextGen:  \"MRSA skin infections; Management:  I and D, right (10/20 Constitutional: He is oriented to person, place, and time. He appears well-developed and well-nourished. HENT:   Head: Normocephalic and atraumatic. Eyes: Conjunctivae, EOM and lids are normal. Pupils are equal, round, and reactive to light.    Neck: No The following orders were created for panel order CBC WITH DIFFERENTIAL WITH PLATELET.   Procedure                               Abnormality         Status                     ---------                               -----------         ------ PROCEDURE: CT CHEST ABDOMEN (ALL CONTRAST ONLY) (CPT=71260/87676)  COMPARISON: [facility], CT ABDOMEN + PELVIS (CONTRAST ONLY) (CPT=74177), 9/20/2017, 16:32.   INDICATIONS: chest pain radiating to back  TECHNIQUE: CT images of the chest and 11/18/2017 at 10:18          CONCLUSION: 1. No aortic aneurysm or dissection. 2. No acute pulmonary embolism. 3. No evidence of pulmonary disease.         Xr Chest Ap Portable  (cpt=71010)    Result Date: 11/18/2017  PROCEDURE: XR CHEST AP PORTABLE (CPT=710 NSTEMI (non-ST elevated myocardial infarction) ([facility] [address] I21.4 11/18/2017 Unknown[CS.2]              Signed by Sergey Martinez MD on 11/18/2017 11:36 AM   Attribution Key     CS. 1 - Sergey Martinez MD on 11/18/2017  9:33 AM   CS. 2 - Gracy 11-: ELECTROCARDIOGRAM, COMPLETE      Comment: Scanned to Media Tab - Date of Service                11-  No date: MYRINGOTOMY, LASER-ASSISTED  No date: UNLISTED PROC, ARTHROSCOPY Right      Comment: per NextGen:  \"arthroscopy x3 necrotizing CA 9.4 11/18/2017   ALB 4.3 11/22/2016   ALKPHO 39 11/22/2016   BILT 0.6 11/22/2016   TP 6.9 11/22/2016   AST 18 11/22/2016   ALT 19 11/22/2016   TSH 1.50 02/03/2014   TROP 0.05 () 11/18/2017       Mri Spine Cervical (cpt=72141)    Result Date: 11/18/201 Code status - Full    Caprice Villasenor MD  11/18/2017[CL.1]     11/18 CARDIOLOGY CONSULT NOTE  11/18/2017  Reason for Consultation:  Chest pain.     History of Present Illness:  Jolene Moore is a a(n) 51year old male who presents with several days of [employer] • Colon Cancer Maternal Grandmother 48   • Diabetes Maternal Uncle     • Heart Disease Maternal Aunt         CAD, (cause of death, age 46)   • Heart Disease Maternal Uncle         CAD       reports that he has been smoking Cigarettes.   He has a 20.00 pack- Lungs: Clear without wheezes, rales, rhonchi or dullness. Normal excursions and effort. Abdomen: Soft, non-tender. BS-present. Extremities: Without clubbing, cyanosis or edema. Peripheral pulses are 2+. Neurologic: Alert and oriented, normal affect. L5-S1 left mild-mod/right mild foraminal stenosis     bilateral L5-S1 radiculopathies     Tobacco use     NSTEMI (non-ST elevated myocardial infarction) (HCC)     Cervical radiculopathy              Recommendations:  Problem #1 neck pain radiating to le 7. MUSC:                     Except as in HPI otherwise denies muscle weakness, aches, joint pain  8. NEURO:      Except as in HPI otherwise denies tingling, headache, paralysis, ataxia  9. PSYCH:       Denies depression; denies anxiety  10.  ENDO: No Known Allergies        MEDICATIONS:     No current facility-administered medications on file prior to encounter. Current Outpatient Prescriptions on File Prior to Encounter:  Fenofibrate 150 MG Oral Cap Take 1 capsule by mouth once daily.  Disp: 90 cap Right leg OK OK OK OK OK OK                                NOTES:   Inspection, palpation and percussion of joints, bone, and muscle/tendons noted above reveal no misalignment, asymmetry, tenderness or masses except as noted: NONE     ROM of the joints, jessa (11/19/17): The patient has a severe left arm radiculopathy. This seems to be a C7 or C8 radiculopathy although he also has some left biceps weakness which would also suggest C6. His MRI shows central stenosis at C56, 67 and a herniated disc at C7T1.   At Comment: per NextGen:  \"arthroscopy x3 necrotizing                infection right knee\"  Family History   Problem Relation Age of Onset   • Colon Cancer Maternal Grandmother 48   • Diabetes Maternal Uncle     • Heart Disease Maternal Aunt         CAD •  FLEET ENEMA (FLEET) 7-19 GM/118ML enema 133 mL, 1 enema, Rectal, Once PRN  •  ondansetron HCl (ZOFRAN) injection 4 mg, 4 mg, Intravenous, Q6H PRN  •  fenofibrate micronized (LOFIBRA) cap 134 mg, 134 mg, Oral, Daily  •  lisinopril (PRINIVIL,ZESTRIL) tab Left arm and chest pain.   Cervical radiculopathy as seen on MRI.  Pt has several areas of spinal stenosis and a left disc bulge at C7-T1. Not cardiac.  Cards has seen pt. The elevated troponins were erroneous and have been corrected.   CT chest is negati The elevated troponins were erroneous and have been corrected. CT chest is negative for PE and negative for dissection.  - Dr. Eun Guillen and Dr. Dariela Tee from neurosurgery consulted.   [facility].  - Anesthesia pain consulted.     - norco and dilaudid prn  - co 11. Allograft, morselized, for spine surgery     Retractor:            (1)         [place]  (2)         [place]     Implants:  1. C5-6 Intervertebral Cage, NuVasive  2. C6-7 Intervertebral Cage, NuVasive  3. C7-T1 Intervertebral Cage, NuVasive  4.  C5-6-7-T1 L5-S1 left mod-large HNP     L5-S1 left mild-mod/right mild foraminal stenosis     bilateral L5-S1 radiculopathies     Tobacco use     NSTEMI (non-ST elevated myocardial infarction) (HCC)     Cervical radiculopathy      Physical Exam:      /84 (BP Tobacco abuse - counseled on cessation     dvt proph - low risk, ambulating     Code status - Full    Consultations:   Cardiology, neurosurgery    Discharge Condition: Good    Discharge Medications:      Discharge Medications      START taking these medica Schedule an appointment as soon as possible for a visit with Joshua Lenz MD.    Specialty:  NEUROSURGERY  Contact information:  [address] [place] [address] [address]

## 2017-11-27 NOTE — PROGRESS NOTES
HPI:    Mahi Genao is a 51year old male here today for hospital follow up.    He was discharged from Inpatient hospital, [facility]  to Home   Admission Date: 11/25/17   Discharge Date: 11/25/17  Hospital Discharge Diagnosis: Cervical spinal s Fenofibrate 150 MG Oral Cap Take 1 capsule by mouth once daily. lisinopril 20 MG Oral Tab Take 1 tablet (20 mg total) by mouth once daily. HYDROcodone-acetaminophen  MG Oral Tab Take 1-2 tablets by mouth every 6 (six) hours as needed.      No cu A sensory deficit is present. No motor deficit. Left sided hand numbness     ASSESSMENT/ PLAN:   Diagnoses and all orders for this visit:    Cervical radiculopathy      Pain control is getting better.  CPM.  Pain med is being managed by his surgeon at [facility]

## 2017-12-08 ENCOUNTER — HOSPITAL ENCOUNTER (OUTPATIENT)
Dept: MRI IMAGING | Facility: HOSPITAL | Age: 47
Discharge: HOME | End: 2017-12-08
Attending: NEUROLOGICAL SURGERY
Payer: COMMERCIAL

## 2017-12-08 DIAGNOSIS — M47.812 CERVICAL SPONDYLOSIS WITHOUT MYELOPATHY: ICD-10-CM

## 2017-12-08 PROCEDURE — 72141 MRI NECK SPINE W/O DYE: CPT | Performed by: NEUROLOGICAL SURGERY

## 2017-12-18 ENCOUNTER — OFFICE VISIT (OUTPATIENT)
Dept: FAMILY MEDICINE CLINIC | Facility: CLINIC | Age: 47
End: 2017-12-18

## 2017-12-18 VITALS — DIASTOLIC BLOOD PRESSURE: 86 MMHG | HEIGHT: 71 IN | HEART RATE: 77 BPM | SYSTOLIC BLOOD PRESSURE: 127 MMHG

## 2017-12-18 DIAGNOSIS — Z01.818 PRE-OP EXAMINATION: Primary | ICD-10-CM

## 2017-12-18 DIAGNOSIS — M54.12 CERVICAL RADICULOPATHY: ICD-10-CM

## 2017-12-18 PROCEDURE — 99243 OFF/OP CNSLTJ NEW/EST LOW 30: CPT | Performed by: FAMILY MEDICINE

## 2017-12-18 PROCEDURE — 99212 OFFICE O/P EST SF 10 MIN: CPT | Performed by: FAMILY MEDICINE

## 2017-12-18 RX ORDER — GABAPENTIN 300 MG/1
CAPSULE ORAL
Refills: 0 | COMMUNITY
Start: 2017-12-04 | End: 2018-05-14

## 2017-12-18 NOTE — PROGRESS NOTES
HPI:    Patient ID: Joe Jaime is a 51year old male. HPI  Patient presents with:  Pre-Op Exam: surgery w/ Dr Sea Reyes 12/27   Scheduled for cervical foramenotomy.       Past Medical History:   Diagnosis Date   • Back problem    • Esophageal reflux    • Ear: External ear normal.   Nose: Nose normal.   Mouth/Throat: Oropharynx is clear and moist.   Eyes: Conjunctivae and EOM are normal. Pupils are equal, round, and reactive to light. Neck: Normal range of motion. Neck supple.    Cardiovascular: Normal rat

## 2017-12-19 ENCOUNTER — APPOINTMENT (OUTPATIENT)
Dept: LAB | Age: 47
End: 2017-12-19
Attending: FAMILY MEDICINE
Payer: COMMERCIAL

## 2017-12-19 ENCOUNTER — LAB ENCOUNTER (OUTPATIENT)
Dept: LAB | Age: 47
End: 2017-12-19
Attending: FAMILY MEDICINE
Payer: COMMERCIAL

## 2017-12-19 DIAGNOSIS — Z01.818 PRE-OP EXAMINATION: ICD-10-CM

## 2017-12-19 PROCEDURE — 85730 THROMBOPLASTIN TIME PARTIAL: CPT

## 2017-12-19 PROCEDURE — 93005 ELECTROCARDIOGRAM TRACING: CPT

## 2017-12-19 PROCEDURE — 85610 PROTHROMBIN TIME: CPT

## 2017-12-19 PROCEDURE — 80053 COMPREHEN METABOLIC PANEL: CPT

## 2017-12-19 PROCEDURE — 85025 COMPLETE CBC W/AUTO DIFF WBC: CPT

## 2017-12-19 PROCEDURE — 36415 COLL VENOUS BLD VENIPUNCTURE: CPT

## 2017-12-19 PROCEDURE — 93010 ELECTROCARDIOGRAM REPORT: CPT | Performed by: FAMILY MEDICINE

## 2017-12-21 ENCOUNTER — TELEPHONE (OUTPATIENT)
Dept: FAMILY MEDICINE CLINIC | Facility: CLINIC | Age: 47
End: 2017-12-21

## 2018-01-24 RX ORDER — LISINOPRIL 20 MG/1
20 TABLET ORAL
Qty: 90 TABLET | Refills: 0 | Status: SHIPPED | OUTPATIENT
Start: 2018-01-24 | End: 2018-05-03

## 2018-01-24 NOTE — TELEPHONE ENCOUNTER
Pharmacy called in for pt requesting a refill on medication below. Pharmacy states they have sent numerous amounts of faxes regarding a refill for this pt. Please advise.     Current Outpatient Prescriptions:     •  lisinopril 20 MG Oral Tab, Take 1 table

## 2018-02-26 RX ORDER — FENOFIBRATE 150 MG/1
CAPSULE ORAL
Qty: 90 CAPSULE | Refills: 0 | Status: SHIPPED | OUTPATIENT
Start: 2018-02-26 | End: 2018-06-03

## 2018-03-12 ENCOUNTER — HOSPITAL ENCOUNTER (OUTPATIENT)
Dept: GENERAL RADIOLOGY | Facility: HOSPITAL | Age: 48
Discharge: HOME | End: 2018-03-12
Attending: NEUROLOGICAL SURGERY
Payer: COMMERCIAL

## 2018-03-12 DIAGNOSIS — M47.812 SPONDYLOSIS OF CERVICAL REGION WITHOUT MYELOPATHY OR RADICULOPATHY: ICD-10-CM

## 2018-03-12 PROCEDURE — 72040 X-RAY EXAM NECK SPINE 2-3 VW: CPT | Performed by: NEUROLOGICAL SURGERY

## 2018-05-07 RX ORDER — LISINOPRIL 20 MG/1
TABLET ORAL
Qty: 90 TABLET | Refills: 0 | Status: SHIPPED | OUTPATIENT
Start: 2018-05-07 | End: 2018-09-21

## 2018-05-07 NOTE — TELEPHONE ENCOUNTER
Hypertensive Medications  Protocol Criteria:  · Appointment scheduled in the past 6 months or in the next 3 months  · BMP or CMP in the past 12 months  · Creatinine result < 2  Recent Outpatient Visits            4 months ago Pre-op examination    [place]

## 2018-05-14 ENCOUNTER — OFFICE VISIT (OUTPATIENT)
Dept: FAMILY MEDICINE CLINIC | Facility: CLINIC | Age: 48
End: 2018-05-14

## 2018-05-14 VITALS
HEART RATE: 60 BPM | BODY MASS INDEX: 30 KG/M2 | SYSTOLIC BLOOD PRESSURE: 113 MMHG | TEMPERATURE: 98 F | DIASTOLIC BLOOD PRESSURE: 75 MMHG | WEIGHT: 216 LBS

## 2018-05-14 DIAGNOSIS — H66.005 RECURRENT ACUTE SUPPURATIVE OTITIS MEDIA WITHOUT SPONTANEOUS RUPTURE OF LEFT TYMPANIC MEMBRANE: Primary | ICD-10-CM

## 2018-05-14 PROCEDURE — 99212 OFFICE O/P EST SF 10 MIN: CPT | Performed by: FAMILY MEDICINE

## 2018-05-14 PROCEDURE — 99213 OFFICE O/P EST LOW 20 MIN: CPT | Performed by: FAMILY MEDICINE

## 2018-05-14 RX ORDER — SILDENAFIL 50 MG/1
50 TABLET, FILM COATED ORAL
Qty: 6 TABLET | Refills: 5 | Status: SHIPPED | OUTPATIENT
Start: 2018-05-14 | End: 2019-04-12

## 2018-05-14 RX ORDER — AMOXICILLIN AND CLAVULANATE POTASSIUM 875; 125 MG/1; MG/1
1 TABLET, FILM COATED ORAL 2 TIMES DAILY
Qty: 14 TABLET | Refills: 0 | Status: SHIPPED | OUTPATIENT
Start: 2018-05-14 | End: 2018-05-24

## 2018-05-21 ENCOUNTER — TELEPHONE (OUTPATIENT)
Dept: OTHER | Age: 48
End: 2018-05-21

## 2018-05-21 ENCOUNTER — OFFICE VISIT (OUTPATIENT)
Dept: FAMILY MEDICINE CLINIC | Facility: CLINIC | Age: 48
End: 2018-05-21

## 2018-05-21 VITALS
BODY MASS INDEX: 30 KG/M2 | TEMPERATURE: 98 F | HEART RATE: 66 BPM | SYSTOLIC BLOOD PRESSURE: 112 MMHG | WEIGHT: 216 LBS | DIASTOLIC BLOOD PRESSURE: 72 MMHG

## 2018-05-21 DIAGNOSIS — H66.005 RECURRENT ACUTE SUPPURATIVE OTITIS MEDIA WITHOUT SPONTANEOUS RUPTURE OF LEFT TYMPANIC MEMBRANE: Primary | ICD-10-CM

## 2018-05-21 PROCEDURE — 99213 OFFICE O/P EST LOW 20 MIN: CPT | Performed by: FAMILY MEDICINE

## 2018-05-21 PROCEDURE — 99212 OFFICE O/P EST SF 10 MIN: CPT | Performed by: FAMILY MEDICINE

## 2018-05-21 RX ORDER — METHYLPREDNISOLONE 4 MG/1
TABLET ORAL
Qty: 1 PACKAGE | Refills: 0 | Status: SHIPPED | OUTPATIENT
Start: 2018-05-21 | End: 2018-10-04 | Stop reason: ALTCHOICE

## 2018-05-21 NOTE — PROGRESS NOTES
HPI:    Patient ID: Bossman Garcia is a 49year old male. HPI  Patient presents with:  Ear Pain: f/u left ear infection, pt states he finished antibiotic but still having pain  History of tympanostomy tubes.    Review of Systems   Constitutional: Gurjit Young

## 2018-05-21 NOTE — TELEPHONE ENCOUNTER
SUMMARY: Condition update, pain in left ear. Pt had OV 5/14/18 with Dr Stephen Reynolds for left ear pain. Pt was given amoxicillin. Pt states took last pill today and no change in symptoms. Pt still has significant pain in left ear.   Pt denies drng from ea

## 2018-05-21 NOTE — TELEPHONE ENCOUNTER
Pt called back to check on status of message. Appt is available at 3:45, pt is able to make it to the office by that time.  Appt was scheduled as he completed his antibiotic and pain has worsened,

## 2018-06-04 RX ORDER — FENOFIBRATE 150 MG/1
CAPSULE ORAL
Qty: 90 CAPSULE | Refills: 0 | Status: SHIPPED | OUTPATIENT
Start: 2018-06-04 | End: 2018-10-24

## 2018-06-04 NOTE — TELEPHONE ENCOUNTER
Refill passed per [facility] protocol.     Cholesterol Medications  Protocol Criteria:  · Appointment scheduled in the past 12 months or in the next 3 months  · ALT & LDL on file in the past 12 months  · ALT result < 80  · LDL result <130   Recent Outp

## 2018-09-18 ENCOUNTER — OFFICE VISIT (OUTPATIENT)
Dept: FAMILY MEDICINE CLINIC | Facility: CLINIC | Age: 48
End: 2018-09-18
Payer: COMMERCIAL

## 2018-09-18 VITALS
HEART RATE: 62 BPM | BODY MASS INDEX: 30 KG/M2 | SYSTOLIC BLOOD PRESSURE: 124 MMHG | DIASTOLIC BLOOD PRESSURE: 80 MMHG | TEMPERATURE: 99 F | WEIGHT: 218 LBS

## 2018-09-18 DIAGNOSIS — H65.03 BILATERAL ACUTE SEROUS OTITIS MEDIA, RECURRENCE NOT SPECIFIED: Primary | ICD-10-CM

## 2018-09-18 DIAGNOSIS — J30.1 SEASONAL ALLERGIC RHINITIS DUE TO POLLEN: ICD-10-CM

## 2018-09-18 DIAGNOSIS — Z72.0 TOBACCO USE: ICD-10-CM

## 2018-09-18 DIAGNOSIS — H93.13 TINNITUS OF BOTH EARS: ICD-10-CM

## 2018-09-18 PROCEDURE — 99214 OFFICE O/P EST MOD 30 MIN: CPT | Performed by: NURSE PRACTITIONER

## 2018-09-18 RX ORDER — AMOXICILLIN AND CLAVULANATE POTASSIUM 875; 125 MG/1; MG/1
1 TABLET, FILM COATED ORAL 2 TIMES DAILY
Qty: 14 TABLET | Refills: 0 | Status: SHIPPED | OUTPATIENT
Start: 2018-09-18 | End: 2018-09-25

## 2018-09-18 NOTE — PROGRESS NOTES
HPI  Pt here for bilat ear pain and pressure-has h/o ear tubes. Has sore throat-son dx'd strep yesterday. Denies fever, cough, congestion. Smokes about a ppd.   Has constant ringing in ears-chronic issue  Review of Systems   Constitutional: Negative for ac Uncle    • Heart Disease Maternal Aunt         CAD, (cause of death, age 46)   • Heart Disease Maternal Uncle         CAD       Social History    Socioeconomic History      Marital status:       Spouse name: Not on file      Number of children: Not MOUTH EVERY DAY Disp: 90 capsule Rfl: 0   methylPREDNISolone (MEDROL) 4 MG Oral Tablet Therapy Pack As directed.  Disp: 1 Package Rfl: 0   Sildenafil Citrate (VIAGRA) 50 MG Oral Tab Take 1 tablet (50 mg total) by mouth daily as needed for Erectile Dysfuncti normal mood and affect.  His behavior is normal. Judgment and thought content normal.       Assessment and Plan:   Problem List Items Addressed This Visit        Unprioritized    Tobacco use     Unable to tolerate Chantix due to nightmares  Referral for acc

## 2018-09-18 NOTE — PATIENT INSTRUCTIONS
OTITIS MEDIA ADULT (EAR INFECTION)      To help ease ear infection and pain:    1. Sitting upright lessens the throbbing. 2. A heating pad on low over the ear can help by diverting blood flow away from the ear drum.   3. Pain medications are the best thing night    -Call  If symptoms do not improve, worsen or with other questions or concerns

## 2018-09-19 NOTE — ASSESSMENT & PLAN NOTE
Start augmentin 875 mg I po bid x 7 days    Supportive care discussed    Please call if symptoms worsen or are not resolving.

## 2018-09-19 NOTE — ASSESSMENT & PLAN NOTE
-otc non-drowsy antihistamine (generic claritin, zyrtec or allegra)  -add flonase (fluticazone) nasal spray  -supportive care discussed    Please call if symptoms worsen or are not resolving.

## 2018-09-21 RX ORDER — LISINOPRIL 20 MG/1
TABLET ORAL
Qty: 90 TABLET | Refills: 0 | Status: SHIPPED | OUTPATIENT
Start: 2018-09-21 | End: 2019-01-07

## 2018-09-21 NOTE — TELEPHONE ENCOUNTER
Hypertensive Medications  Protocol Criteria:  · Appointment scheduled in the past 6 months or in the next 3 months  · BMP or CMP in the past 12 months  · Creatinine result < 2  Recent Outpatient Visits            3 days ago Bilateral acute serous otitis me

## 2018-10-04 ENCOUNTER — OFFICE VISIT (OUTPATIENT)
Dept: AUDIOLOGY | Facility: CLINIC | Age: 48
End: 2018-10-04
Payer: COMMERCIAL

## 2018-10-04 ENCOUNTER — OFFICE VISIT (OUTPATIENT)
Dept: OTOLARYNGOLOGY | Facility: CLINIC | Age: 48
End: 2018-10-04
Payer: COMMERCIAL

## 2018-10-04 VITALS
TEMPERATURE: 97 F | DIASTOLIC BLOOD PRESSURE: 78 MMHG | SYSTOLIC BLOOD PRESSURE: 132 MMHG | HEIGHT: 71 IN | WEIGHT: 215 LBS | BODY MASS INDEX: 30.1 KG/M2

## 2018-10-04 DIAGNOSIS — H93.19 TINNITUS, UNSPECIFIED LATERALITY: ICD-10-CM

## 2018-10-04 DIAGNOSIS — H90.3 SENSORINEURAL HEARING LOSS, BILATERAL: ICD-10-CM

## 2018-10-04 DIAGNOSIS — H93.13 TINNITUS OF BOTH EARS: Primary | ICD-10-CM

## 2018-10-04 DIAGNOSIS — R13.10 DYSPHAGIA, UNSPECIFIED TYPE: ICD-10-CM

## 2018-10-04 DIAGNOSIS — H65.30 CHRONIC MUCOID OTITIS MEDIA, UNSPECIFIED LATERALITY: ICD-10-CM

## 2018-10-04 PROCEDURE — 92567 TYMPANOMETRY: CPT | Performed by: AUDIOLOGIST

## 2018-10-04 PROCEDURE — 31575 DIAGNOSTIC LARYNGOSCOPY: CPT | Performed by: OTOLARYNGOLOGY

## 2018-10-04 PROCEDURE — 92557 COMPREHENSIVE HEARING TEST: CPT | Performed by: AUDIOLOGIST

## 2018-10-04 PROCEDURE — 69433 CREATE EARDRUM OPENING: CPT | Performed by: OTOLARYNGOLOGY

## 2018-10-04 PROCEDURE — 99244 OFF/OP CNSLTJ NEW/EST MOD 40: CPT | Performed by: OTOLARYNGOLOGY

## 2018-10-04 PROCEDURE — 99212 OFFICE O/P EST SF 10 MIN: CPT | Performed by: OTOLARYNGOLOGY

## 2018-10-04 RX ORDER — MULTIVITAMIN
1 TABLET ORAL DAILY
COMMUNITY
End: 2021-05-06 | Stop reason: ALTCHOICE

## 2018-10-04 RX ORDER — OMEPRAZOLE 40 MG/1
40 CAPSULE, DELAYED RELEASE ORAL DAILY
Qty: 30 CAPSULE | Refills: 11 | Status: SHIPPED | OUTPATIENT
Start: 2018-10-04 | End: 2019-11-07

## 2018-10-04 RX ORDER — FLUOXETINE HYDROCHLORIDE 20 MG/1
20 CAPSULE ORAL DAILY
Qty: 30 CAPSULE | Refills: 11 | Status: SHIPPED | OUTPATIENT
Start: 2018-10-04 | End: 2019-10-31

## 2018-10-04 NOTE — PROGRESS NOTES
Andrade Horn is a 49year old male.  Patient presents with:  Ear Problem: ringing in both ears and pressure, pt had 2 bilateral ear infections in the past 1.5 months   Throat Problem: difficulty swallowing since pt has cervical spine fusion about 8 months amount of stress his son had a valve replacement this summer he had a spinal fusion cervical about 9 months ago and currently his mother is in the hospital at [place] with a abdominal aortic aneurysm and she has been having a rough time of it.   He actually Smoker        Packs/day: 0.50        Years: 20.00        Pack years: 10        Types: Cigarettes        Quit date: 2017        Years since quittin.8      Smokeless tobacco: Former User    Substance and Sexual Activity      Alcohol use:  Yes Surgeon: Meek Morales MD;                Location: [address] [place] [address] ENDOSCOPY  9/22/2017: COLONOSCOPY; N/A      Comment:  Performed by Meek Morales MD at [address] [place] [address] ENDOSCOPY  11-: ELECTROCARDIOGRAM, COMPLETE      Comment:  Scanned to Media Tab - Date of Serv Conjunctiva - Right: Normal, Left: Normal. Pupil - Right: Normal, Left: Normal. EOMI.  GEN   Ears Normal  normal to inspection ear canals are normal there is middle ear effusions bilaterally     Audiogram was done today feeling sensorineural hearing loss b mouth daily. , Disp: 30 capsule, Rfl: 11  •  LISINOPRIL 20 MG Oral Tab, TAKE 1 TABLET(20 MG) BY MOUTH EVERY DAY, Disp: 90 tablet, Rfl: 0  •  FENOFIBRATE 150 MG Oral Cap, TAKE 1 CAPSULE BY MOUTH EVERY DAY, Disp: 90 capsule, Rfl: 0  •  Sildenafil Citrate (VIA

## 2018-10-04 NOTE — PROGRESS NOTES
AUDIOGRAM     Stefan Lion was referred for testing by Una Romero for bilateral  tinnitus  12/21/1970  [id number]        Otoscopic inspection: right ear no cerumen; left ear no cerumen.        Tests/Procedures  Patient was tested via  st levels of technology. Use ear protection whenever exposed to intense noise. Audiological monitoring as needed during the course of medical management.     10/4/2018  Angie [email or web address]

## 2018-10-04 NOTE — PATIENT INSTRUCTIONS
How Hearing Aids Can Help You    If you’re losing your hearing, it can be frustrating: But, hearing aids can help you hear what Verneita Leyden been missing. Not everyone who has hearing loss needs hearing aids.  But if your hearing loss is keeping you from commun © 5988-0514 [employer] [address]. [address] [facility] [place]. All rights reserved. This information is not intended as a substitute for professional medical care. Always follow your healthcare professional's instructions.

## 2018-10-24 RX ORDER — FENOFIBRATE 150 MG/1
CAPSULE ORAL
Qty: 90 CAPSULE | Refills: 0 | Status: SHIPPED | OUTPATIENT
Start: 2018-10-24 | End: 2019-02-07

## 2018-10-25 NOTE — TELEPHONE ENCOUNTER
Refill passed per [facility] protocol.     Cholesterol Medications  Protocol Criteria:  · Appointment scheduled in the past 12 months or in the next 3 months  · ALT & LDL on file in the past 12 months  · ALT result < 80  · LDL result <130   Recent Outp

## 2019-01-11 RX ORDER — LISINOPRIL 20 MG/1
TABLET ORAL
Qty: 90 TABLET | Refills: 0 | Status: SHIPPED | OUTPATIENT
Start: 2019-01-11 | End: 2019-02-21

## 2019-01-17 ENCOUNTER — OFFICE VISIT (OUTPATIENT)
Dept: OTOLARYNGOLOGY | Facility: CLINIC | Age: 49
End: 2019-01-17
Payer: COMMERCIAL

## 2019-01-17 VITALS
BODY MASS INDEX: 30.1 KG/M2 | WEIGHT: 215 LBS | TEMPERATURE: 98 F | DIASTOLIC BLOOD PRESSURE: 85 MMHG | HEIGHT: 71 IN | SYSTOLIC BLOOD PRESSURE: 135 MMHG

## 2019-01-17 DIAGNOSIS — H93.13 TINNITUS OF BOTH EARS: ICD-10-CM

## 2019-01-17 DIAGNOSIS — R04.0 EPISTAXIS: Primary | ICD-10-CM

## 2019-01-17 PROCEDURE — 30901 CONTROL OF NOSEBLEED: CPT | Performed by: OTOLARYNGOLOGY

## 2019-01-17 PROCEDURE — 99213 OFFICE O/P EST LOW 20 MIN: CPT | Performed by: OTOLARYNGOLOGY

## 2019-01-17 PROCEDURE — 99212 OFFICE O/P EST SF 10 MIN: CPT | Performed by: OTOLARYNGOLOGY

## 2019-01-17 NOTE — PROGRESS NOTES
Chavez Rodríguez is a 51year old male. Patient presents with:   Follow - Up: regarding tinnitus and dysphagia, slight improvement in symptoms with tinnitus and improvement in symptoms with dysphagia   Nose Problem: frequent nosebleeds from the left nostil fo tremendous amount of stress his son had a valve replacement this summer he had a spinal fusion cervical about 9 months ago and currently his mother is in the hospital at [facility] with a abdominal aortic aneurysm and she has been having a rough time of it.   H nose it bleeds and also if he strains during a bowel movement he will get bleeding more left-sided than right-sided it seems to gush fairly significantly. He is not had any treatment for this and is not on any blood thinners currently.   He still has the t Problem Relation Age of Onset   • Colon Cancer Maternal Grandmother 48   • Diabetes Maternal Uncle    • Heart Disease Maternal Aunt         CAD, (cause of death, age 46)   • Heart Disease Maternal Uncle         CAD     Past Medical History:   Diagnosis D EXAM    /85   Temp 98.1 °F (36.7 °C) (Tympanic)   Ht 5' 11\" (1.803 m)   Wt 215 lb (97.5 kg)   BMI 29.99 kg/m²     System Findings Details   Skin Normal Inspection - Normal. No suspicious lesions bruises or masses.    Constitutional Normal Overall sarah MG Oral Tab, Take 1 tablet (750 mg total) by mouth 3 (three) times daily as needed. , Disp: 90 tablet, Rfl: 0    ASSESSMENT AND PLAN  1epistaxis  I discussed the pathophysiology of epistaxis at length with the patient.  Potential causes include :high blood p

## 2019-02-08 RX ORDER — FENOFIBRATE 150 MG/1
CAPSULE ORAL
Qty: 30 CAPSULE | Refills: 0 | Status: SHIPPED | OUTPATIENT
Start: 2019-02-08 | End: 2019-03-20

## 2019-02-21 ENCOUNTER — OFFICE VISIT (OUTPATIENT)
Dept: FAMILY MEDICINE CLINIC | Facility: CLINIC | Age: 49
End: 2019-02-21
Payer: COMMERCIAL

## 2019-02-21 VITALS
SYSTOLIC BLOOD PRESSURE: 157 MMHG | BODY MASS INDEX: 31 KG/M2 | WEIGHT: 225 LBS | DIASTOLIC BLOOD PRESSURE: 82 MMHG | HEART RATE: 65 BPM

## 2019-02-21 DIAGNOSIS — I10 ESSENTIAL HYPERTENSION: Primary | ICD-10-CM

## 2019-02-21 PROCEDURE — 99214 OFFICE O/P EST MOD 30 MIN: CPT | Performed by: FAMILY MEDICINE

## 2019-02-21 PROCEDURE — 99212 OFFICE O/P EST SF 10 MIN: CPT | Performed by: FAMILY MEDICINE

## 2019-02-21 RX ORDER — LISINOPRIL 20 MG/1
TABLET ORAL
Qty: 90 TABLET | Refills: 3 | Status: SHIPPED | OUTPATIENT
Start: 2019-02-21 | End: 2020-05-06

## 2019-02-21 RX ORDER — LISINOPRIL 20 MG/1
TABLET ORAL
Qty: 90 TABLET | Refills: 3 | Status: SHIPPED | OUTPATIENT
Start: 2019-02-21 | End: 2019-02-21

## 2019-02-22 NOTE — PROGRESS NOTES
HPI:    Patient ID: Sharon Chowdhury is a 51year old male. HPI  Patient presents with:  Hypertension: f/u medication  Anxiety: f/u prozac medication, pt was given this by ENT    Review of Systems   Constitutional: Negative.     HENT: Positive for tinnitus

## 2019-02-25 ENCOUNTER — LAB ENCOUNTER (OUTPATIENT)
Dept: LAB | Age: 49
End: 2019-02-25
Attending: FAMILY MEDICINE
Payer: COMMERCIAL

## 2019-02-25 DIAGNOSIS — I10 ESSENTIAL HYPERTENSION: ICD-10-CM

## 2019-02-25 LAB
ALBUMIN SERPL-MCNC: 4.1 G/DL (ref 3.4–5)
ALBUMIN/GLOB SERPL: 1.3 {RATIO} (ref 1–2)
ALP LIVER SERPL-CCNC: 51 U/L (ref 45–117)
ALT SERPL-CCNC: 30 U/L (ref 16–61)
ANION GAP SERPL CALC-SCNC: 4 MMOL/L (ref 0–18)
AST SERPL-CCNC: 18 U/L (ref 15–37)
BASOPHILS # BLD AUTO: 0.04 X10(3) UL (ref 0–0.2)
BASOPHILS NFR BLD AUTO: 0.5 %
BILIRUB SERPL-MCNC: 0.4 MG/DL (ref 0.1–2)
BUN BLD-MCNC: 22 MG/DL (ref 7–18)
BUN/CREAT SERPL: 17.9 (ref 10–20)
CALCIUM BLD-MCNC: 9.1 MG/DL (ref 8.5–10.1)
CHLORIDE SERPL-SCNC: 110 MMOL/L (ref 98–107)
CHOLEST SMN-MCNC: 196 MG/DL (ref ?–200)
CO2 SERPL-SCNC: 28 MMOL/L (ref 21–32)
CREAT BLD-MCNC: 1.23 MG/DL (ref 0.7–1.3)
DEPRECATED RDW RBC AUTO: 40.4 FL (ref 35.1–46.3)
EOSINOPHIL # BLD AUTO: 0.1 X10(3) UL (ref 0–0.7)
EOSINOPHIL NFR BLD AUTO: 1.3 %
ERYTHROCYTE [DISTWIDTH] IN BLOOD BY AUTOMATED COUNT: 12.9 % (ref 11–15)
GLOBULIN PLAS-MCNC: 3.2 G/DL (ref 2.8–4.4)
GLUCOSE BLD-MCNC: 106 MG/DL (ref 70–99)
HCT VFR BLD AUTO: 43.8 % (ref 39–53)
HDLC SERPL-MCNC: 36 MG/DL (ref 40–59)
HGB BLD-MCNC: 14.3 G/DL (ref 13–17.5)
IMM GRANULOCYTES # BLD AUTO: 0.04 X10(3) UL (ref 0–1)
IMM GRANULOCYTES NFR BLD: 0.5 %
LDLC SERPL CALC-MCNC: 114 MG/DL (ref ?–100)
LYMPHOCYTES # BLD AUTO: 1.58 X10(3) UL (ref 1–4)
LYMPHOCYTES NFR BLD AUTO: 20.8 %
M PROTEIN MFR SERPL ELPH: 7.3 G/DL (ref 6.4–8.2)
MCH RBC QN AUTO: 28.4 PG (ref 26–34)
MCHC RBC AUTO-ENTMCNC: 32.6 G/DL (ref 31–37)
MCV RBC AUTO: 86.9 FL (ref 80–100)
MONOCYTES # BLD AUTO: 0.4 X10(3) UL (ref 0.1–1)
MONOCYTES NFR BLD AUTO: 5.3 %
NEUTROPHILS # BLD AUTO: 5.43 X10 (3) UL (ref 1.5–7.7)
NEUTROPHILS # BLD AUTO: 5.43 X10(3) UL (ref 1.5–7.7)
NEUTROPHILS NFR BLD AUTO: 71.6 %
NONHDLC SERPL-MCNC: 160 MG/DL (ref ?–130)
OSMOLALITY SERPL CALC.SUM OF ELEC: 298 MOSM/KG (ref 275–295)
PLATELET # BLD AUTO: 234 10(3)UL (ref 150–450)
POTASSIUM SERPL-SCNC: 4.4 MMOL/L (ref 3.5–5.1)
RBC # BLD AUTO: 5.04 X10(6)UL (ref 4.3–5.7)
SODIUM SERPL-SCNC: 142 MMOL/L (ref 136–145)
TRIGL SERPL-MCNC: 232 MG/DL (ref 30–149)
VLDLC SERPL CALC-MCNC: 46 MG/DL (ref 0–30)
WBC # BLD AUTO: 7.6 X10(3) UL (ref 4–11)

## 2019-02-25 PROCEDURE — 36415 COLL VENOUS BLD VENIPUNCTURE: CPT

## 2019-02-25 PROCEDURE — 80061 LIPID PANEL: CPT

## 2019-02-25 PROCEDURE — 85025 COMPLETE CBC W/AUTO DIFF WBC: CPT

## 2019-02-25 PROCEDURE — 80053 COMPREHEN METABOLIC PANEL: CPT

## 2019-03-20 RX ORDER — FENOFIBRATE 150 MG/1
CAPSULE ORAL
Qty: 30 CAPSULE | Refills: 2 | Status: SHIPPED | OUTPATIENT
Start: 2019-03-20 | End: 2019-06-25

## 2019-04-12 RX ORDER — SILDENAFIL 50 MG/1
TABLET, FILM COATED ORAL
Qty: 6 TABLET | Refills: 1 | Status: SHIPPED | OUTPATIENT
Start: 2019-04-12 | End: 2019-10-31

## 2019-05-13 ENCOUNTER — OFFICE VISIT (OUTPATIENT)
Dept: FAMILY MEDICINE CLINIC | Facility: CLINIC | Age: 49
End: 2019-05-13
Payer: COMMERCIAL

## 2019-05-13 ENCOUNTER — HOSPITAL ENCOUNTER (OUTPATIENT)
Dept: GENERAL RADIOLOGY | Facility: HOSPITAL | Age: 49
Discharge: HOME | End: 2019-05-13
Attending: PHYSICIAN ASSISTANT
Payer: COMMERCIAL

## 2019-05-13 VITALS
HEIGHT: 71 IN | TEMPERATURE: 98 F | WEIGHT: 124 LBS | DIASTOLIC BLOOD PRESSURE: 93 MMHG | HEART RATE: 71 BPM | SYSTOLIC BLOOD PRESSURE: 144 MMHG | BODY MASS INDEX: 17.36 KG/M2

## 2019-05-13 DIAGNOSIS — M25.572 ACUTE LEFT ANKLE PAIN: ICD-10-CM

## 2019-05-13 DIAGNOSIS — M25.572 ACUTE LEFT ANKLE PAIN: Primary | ICD-10-CM

## 2019-05-13 PROCEDURE — 73610 X-RAY EXAM OF ANKLE: CPT | Performed by: PHYSICIAN ASSISTANT

## 2019-05-13 PROCEDURE — 99212 OFFICE O/P EST SF 10 MIN: CPT | Performed by: PHYSICIAN ASSISTANT

## 2019-05-13 PROCEDURE — 99213 OFFICE O/P EST LOW 20 MIN: CPT | Performed by: PHYSICIAN ASSISTANT

## 2019-05-13 NOTE — PROGRESS NOTES
HPI:    Patient ID: Yusuf Montes De Oca is a 51year old male. Patient presents with left ankle pain for the past 2 days. Pt was running and rolled his ankle by inverting his ankle. Pt has tried taking advil and provided no relief.  Pt is able to walk and kia He appears well-developed and well-nourished. Cardiovascular: Normal rate, regular rhythm and normal heart sounds. Pulmonary/Chest: Effort normal and breath sounds normal. He has no wheezes. He has no rales. Musculoskeletal: Normal range of motion.

## 2019-06-25 RX ORDER — FENOFIBRATE 150 MG/1
CAPSULE ORAL
Qty: 90 CAPSULE | Refills: 1 | Status: SHIPPED | OUTPATIENT
Start: 2019-06-25 | End: 2020-02-21

## 2019-06-25 NOTE — TELEPHONE ENCOUNTER
Refill passed per [facility] protocol.   Cholesterol Medications  Protocol Criteria:  · Appointment scheduled in the past 12 months or in the next 3 months  · ALT & LDL on file in the past 12 months  · ALT result < 80  · LDL result <130   Recent Outpat

## 2019-07-15 RX ORDER — SILDENAFIL 50 MG/1
TABLET, FILM COATED ORAL
Qty: 6 TABLET | Refills: 1 | Status: SHIPPED | OUTPATIENT
Start: 2019-07-15 | End: 2019-10-28

## 2019-07-16 NOTE — TELEPHONE ENCOUNTER
Refill passed per [facility] protocol.   Refill Protocol Appointment Criteria  · Appointment scheduled in the past 12 months or in the next 3 months  Recent Outpatient Visits            2 months ago Acute left ankle pain    [facility], [place]

## 2019-08-20 ENCOUNTER — HOSPITAL ENCOUNTER (OUTPATIENT)
Age: 49
Discharge: HOME | End: 2019-08-20
Payer: COMMERCIAL

## 2019-08-20 VITALS
OXYGEN SATURATION: 97 % | HEIGHT: 71 IN | TEMPERATURE: 97 F | RESPIRATION RATE: 18 BRPM | SYSTOLIC BLOOD PRESSURE: 132 MMHG | BODY MASS INDEX: 17 KG/M2 | HEART RATE: 64 BPM | DIASTOLIC BLOOD PRESSURE: 83 MMHG

## 2019-08-20 DIAGNOSIS — J01.90 ACUTE SINUSITIS, RECURRENCE NOT SPECIFIED, UNSPECIFIED LOCATION: Primary | ICD-10-CM

## 2019-08-20 LAB — S PYO AG THROAT QL: NEGATIVE

## 2019-08-20 PROCEDURE — 99213 OFFICE O/P EST LOW 20 MIN: CPT

## 2019-08-20 PROCEDURE — 87430 STREP A AG IA: CPT

## 2019-08-20 PROCEDURE — 99214 OFFICE O/P EST MOD 30 MIN: CPT

## 2019-08-20 RX ORDER — BENZONATATE 100 MG/1
200 CAPSULE ORAL 3 TIMES DAILY PRN
Qty: 30 CAPSULE | Refills: 0 | Status: SHIPPED | OUTPATIENT
Start: 2019-08-20 | End: 2019-09-19

## 2019-08-20 RX ORDER — ALBUTEROL SULFATE 90 UG/1
2 AEROSOL, METERED RESPIRATORY (INHALATION) EVERY 4 HOURS PRN
Qty: 1 INHALER | Refills: 0 | Status: SHIPPED | OUTPATIENT
Start: 2019-08-20 | End: 2019-09-19

## 2019-08-20 RX ORDER — DOXYCYCLINE HYCLATE 100 MG/1
100 CAPSULE ORAL 2 TIMES DAILY
Qty: 20 CAPSULE | Refills: 0 | Status: SHIPPED | OUTPATIENT
Start: 2019-08-20 | End: 2019-08-30

## 2019-08-20 NOTE — ED PROVIDER NOTES
Patient presents with:  Sore Throat      HPI:     Hakeem Morales is a 51year old male who presents with a chief complaint of frontal and maxillary sinus congestion, sinus discomfort, thick purulent drainage from the nose, sore throat, body aches, and a pr status: Current Every Day Smoker        Packs/day: 1.00        Years: 30.00        Pack years: 30        Types: Cigarettes        Quit date: 2017        Years since quittin.7      Smokeless tobacco: Never Used    Substance and Sexual Activity noted above. Constitutional and Vital Signs Reviewed.       Physical Exam:     Findings:    /83   Pulse 64   Temp 97.4 °F (36.3 °C) (Oral)   Resp 18   Ht 180.3 cm (5' 11\")   SpO2 97%   BMI 17.29 kg/m²   GENERAL: well developed, well nourished, well discharge instructions for your condition today.     Follow Up with:  Josse Osborn DO  [address]  [phone]    Schedule an appointment as soon as possible for a visit in 2 days

## 2019-09-04 ENCOUNTER — OFFICE VISIT (OUTPATIENT)
Dept: AUDIOLOGY | Facility: CLINIC | Age: 49
End: 2019-09-04
Payer: COMMERCIAL

## 2019-09-04 ENCOUNTER — OFFICE VISIT (OUTPATIENT)
Dept: OTOLARYNGOLOGY | Facility: CLINIC | Age: 49
End: 2019-09-04
Payer: COMMERCIAL

## 2019-09-04 VITALS
DIASTOLIC BLOOD PRESSURE: 83 MMHG | BODY MASS INDEX: 30.1 KG/M2 | HEIGHT: 71 IN | TEMPERATURE: 98 F | SYSTOLIC BLOOD PRESSURE: 124 MMHG | WEIGHT: 215 LBS

## 2019-09-04 DIAGNOSIS — H69.93 EUSTACHIAN TUBE DISORDER, BILATERAL: Primary | ICD-10-CM

## 2019-09-04 DIAGNOSIS — R13.10 DYSPHAGIA, UNSPECIFIED TYPE: ICD-10-CM

## 2019-09-04 DIAGNOSIS — H93.13 TINNITUS OF BOTH EARS: Primary | ICD-10-CM

## 2019-09-04 PROCEDURE — 99213 OFFICE O/P EST LOW 20 MIN: CPT | Performed by: OTOLARYNGOLOGY

## 2019-09-04 PROCEDURE — 31575 DIAGNOSTIC LARYNGOSCOPY: CPT | Performed by: OTOLARYNGOLOGY

## 2019-09-04 PROCEDURE — 92567 TYMPANOMETRY: CPT | Performed by: AUDIOLOGIST

## 2019-09-04 RX ORDER — PREDNISONE 20 MG/1
TABLET ORAL
Qty: 15 TABLET | Refills: 0 | Status: SHIPPED | OUTPATIENT
Start: 2019-09-04 | End: 2019-11-07

## 2019-09-04 NOTE — PROGRESS NOTES
IMMITANCE TESTING    Tympanometric testing only per Dr. Alo Romero    Classification: Bilateral:  Type B: flat tympanogram  Ear canal volume: Right 5.5 mL, Left 7.2 mL  Peak middle ear pressure: Right flat daP, Left: flat daP      Tympanograms were flat with a

## 2019-09-04 NOTE — PROGRESS NOTES
Bossman Garcia is a 51year old male.  Patient presents with:  Throat Problem: pt reports throat pain for 3-4, hoarseness   Ringing In Ear: in both ears getting worst, both ears feel clogged         HISTORY OF PRESENT ILLNESS    8/4/16 Here for evaluation o spinal fusion cervical about 9 months ago and currently his mother is in the hospital at [address] [place] [address] with a abdominal aortic aneurysm and she has been having a rough time of it.   He actually was so anxiety ridden that he actually passed out when he found out so more left-sided than right-sided it seems to gush fairly significantly. He is not had any treatment for this and is not on any blood thinners currently.   He still has the tinnitus and is managing with masking  9/4/2019  He was in [place] several weeks ago w file      Stress: Not on file    Relationships      Social connections:        Talks on phone: Not on file        Gets together: Not on file        Attends [employer] service: Not on file        Active member of club or organization: Not on file        Atte INST 1 LEVEL N/A 11/21/2017    Performed by Yaron Shin MD at [address] [place] [address] MAIN OR   • BACK SURGERY     • COLONOSCOPY N/A 9/22/2017    Performed by Neli Amaral MD at [address] [place] [address] ENDOSCOPY   • ELECTROCARDIOGRAM, COMPLETE  11-    Scanned to Media Tab - Date of however   Neurological Normal Memory - Normal. Cranial nerves - Cranial nerves II through XII grossly intact. Neck Exam Normal  normal to inspection  And palpation and no lymphadenopathy is noted     Psychiatric Normal   Appropriate mood and affect.    Alejandra Poles 1 TABLET(50 MG) BY MOUTH DAILY AS NEEDED FOR ERECTILE DYSFUNCTION, Disp: 6 tablet, Rfl: 1  •  lisinopril 20 MG Oral Tab, TAKE 1 TABLET(20 MG) BY MOUTH EVERY DAY, Disp: 90 tablet, Rfl: 3  •  Multiple Vitamin (MULTI-VITAMIN DAILY) Oral Tab, Take 1 tablet by

## 2019-10-31 RX ORDER — FLUOXETINE HYDROCHLORIDE 20 MG/1
CAPSULE ORAL
Qty: 30 CAPSULE | Refills: 11 | Status: SHIPPED | OUTPATIENT
Start: 2019-10-31 | End: 2021-03-01

## 2019-10-31 RX ORDER — SILDENAFIL 50 MG/1
TABLET, FILM COATED ORAL
Qty: 6 TABLET | Refills: 1 | Status: SHIPPED | OUTPATIENT
Start: 2019-10-31 | End: 2019-11-07

## 2019-11-07 ENCOUNTER — OFFICE VISIT (OUTPATIENT)
Dept: FAMILY MEDICINE CLINIC | Facility: CLINIC | Age: 49
End: 2019-11-07
Payer: COMMERCIAL

## 2019-11-07 VITALS
RESPIRATION RATE: 18 BRPM | SYSTOLIC BLOOD PRESSURE: 146 MMHG | HEART RATE: 67 BPM | TEMPERATURE: 98 F | DIASTOLIC BLOOD PRESSURE: 73 MMHG | WEIGHT: 222 LBS | BODY MASS INDEX: 31.08 KG/M2 | HEIGHT: 71 IN

## 2019-11-07 DIAGNOSIS — I10 ESSENTIAL HYPERTENSION: Primary | ICD-10-CM

## 2019-11-07 PROCEDURE — 99214 OFFICE O/P EST MOD 30 MIN: CPT | Performed by: FAMILY MEDICINE

## 2019-11-07 RX ORDER — OMEPRAZOLE 40 MG/1
40 CAPSULE, DELAYED RELEASE ORAL DAILY
Qty: 90 CAPSULE | Refills: 3 | Status: SHIPPED | OUTPATIENT
Start: 2019-11-07 | End: 2021-01-22

## 2019-11-07 RX ORDER — SILDENAFIL 50 MG/1
50 TABLET, FILM COATED ORAL
Qty: 10 TABLET | Refills: 11 | Status: SHIPPED | OUTPATIENT
Start: 2019-11-07 | End: 2020-04-04

## 2019-11-07 NOTE — PROGRESS NOTES
HPI:    Patient ID: Zena Diallo is a 51year old male. HPI  Patient presents with:  HTN  Medication Follow-Up    Review of Systems   Constitutional: Negative. Respiratory: Negative. Cardiovascular: Negative. Genitourinary: Negative.     Neuro

## 2020-01-16 ENCOUNTER — OFFICE VISIT (OUTPATIENT)
Dept: FAMILY MEDICINE CLINIC | Facility: CLINIC | Age: 50
End: 2020-01-16
Payer: COMMERCIAL

## 2020-01-16 VITALS
TEMPERATURE: 98 F | BODY MASS INDEX: 31.36 KG/M2 | RESPIRATION RATE: 18 BRPM | WEIGHT: 224 LBS | DIASTOLIC BLOOD PRESSURE: 72 MMHG | SYSTOLIC BLOOD PRESSURE: 134 MMHG | HEART RATE: 72 BPM | HEIGHT: 71 IN

## 2020-01-16 DIAGNOSIS — G57.01 PIRIFORMIS SYNDROME, RIGHT: Primary | ICD-10-CM

## 2020-01-16 DIAGNOSIS — M25.551 RIGHT HIP PAIN: ICD-10-CM

## 2020-01-16 PROCEDURE — 99214 OFFICE O/P EST MOD 30 MIN: CPT | Performed by: FAMILY MEDICINE

## 2020-01-16 RX ORDER — METAXALONE 800 MG/1
800 TABLET ORAL NIGHTLY
Qty: 15 TABLET | Refills: 1 | Status: SHIPPED | OUTPATIENT
Start: 2020-01-16 | End: 2020-02-05

## 2020-01-16 RX ORDER — METHYLPREDNISOLONE 4 MG/1
TABLET ORAL
Qty: 1 PACKAGE | Refills: 0 | Status: SHIPPED | OUTPATIENT
Start: 2020-01-16 | End: 2020-07-27 | Stop reason: ALTCHOICE

## 2020-01-16 NOTE — PROGRESS NOTES
HPI:    Patient ID: Mahi Genao is a 47year old male. HPI  Patient presents with:  Pain: Patient present with right hip pain x 3 weeks  No injury or change in activity  Review of Systems   Constitutional: Negative.     Musculoskeletal: Positive for a (SKELAXIN) 800 MG Oral Tab 15 tablet 1     Sig: Take 1 tablet (800 mg total) by mouth nightly for 20 days. • methylPREDNISolone (MEDROL) 4 MG Oral Tablet Therapy Pack 1 Package 0     Sig: As directed.        Imaging & Referrals:  None       MS#[id number]

## 2020-02-21 RX ORDER — FENOFIBRATE 150 MG/1
CAPSULE ORAL
Qty: 90 CAPSULE | Refills: 1 | Status: SHIPPED | OUTPATIENT
Start: 2020-02-21 | End: 2020-10-13

## 2020-02-21 NOTE — TELEPHONE ENCOUNTER
Refill passed per [facility], [facility] protocol.   Cholesterol Medications  Protocol Criteria:  · Appointment scheduled in the past 12 months or in the next 3 months  · ALT & LDL on file in the past 12 months  · ALT result < 80  · LDL result <130   Recent Outpat

## 2020-04-04 RX ORDER — SILDENAFIL 50 MG/1
TABLET, FILM COATED ORAL
Qty: 10 TABLET | Refills: 10 | Status: SHIPPED | OUTPATIENT
Start: 2020-04-04 | End: 2021-08-09

## 2020-05-06 RX ORDER — LISINOPRIL 20 MG/1
TABLET ORAL
Qty: 30 TABLET | Refills: 0 | Status: SHIPPED | OUTPATIENT
Start: 2020-05-06 | End: 2020-06-17

## 2020-05-06 NOTE — TELEPHONE ENCOUNTER
Refill noted. Chart reviewed. Okay to fill times one for 30 day supply with no additional refills. Patient needs to either do virtual visit or office visit to get more fills. Notify pt. Refilled on behalf of Dr. Rosenda Mitchell.

## 2020-06-17 RX ORDER — LISINOPRIL 20 MG/1
TABLET ORAL
Qty: 30 TABLET | Refills: 0 | Status: SHIPPED | OUTPATIENT
Start: 2020-06-17 | End: 2020-07-21

## 2020-06-17 NOTE — TELEPHONE ENCOUNTER
Refill noted. Chart reviewed. Okay to fill times one for 30 day supply with no additional refills. Needs physical. Refilled on behalf of Dr. Janelle Chowdhury.

## 2020-06-18 NOTE — TELEPHONE ENCOUNTER
Called spoke with pt in regards to medication refilled for 30 days will need to make appt for annual px for additional refills, pt understands verbal will call back to make appt

## 2020-07-21 RX ORDER — LISINOPRIL 20 MG/1
TABLET ORAL
Qty: 30 TABLET | Refills: 0 | Status: SHIPPED | OUTPATIENT
Start: 2020-07-21 | End: 2020-08-20

## 2020-07-27 ENCOUNTER — OFFICE VISIT (OUTPATIENT)
Dept: FAMILY MEDICINE CLINIC | Facility: CLINIC | Age: 50
End: 2020-07-27
Payer: COMMERCIAL

## 2020-07-27 VITALS
HEIGHT: 71 IN | BODY MASS INDEX: 30.94 KG/M2 | WEIGHT: 221 LBS | HEART RATE: 74 BPM | DIASTOLIC BLOOD PRESSURE: 81 MMHG | TEMPERATURE: 98 F | SYSTOLIC BLOOD PRESSURE: 149 MMHG

## 2020-07-27 DIAGNOSIS — Z00.00 ROUTINE GENERAL MEDICAL EXAMINATION AT A HEALTH CARE FACILITY: Primary | ICD-10-CM

## 2020-07-27 DIAGNOSIS — M54.12 CERVICAL RADICULOPATHY: ICD-10-CM

## 2020-07-27 DIAGNOSIS — M54.16 LUMBAR RADICULOPATHY: ICD-10-CM

## 2020-07-27 DIAGNOSIS — I10 ESSENTIAL HYPERTENSION: ICD-10-CM

## 2020-07-27 DIAGNOSIS — E78.00 HYPERCHOLESTEROLEMIA: ICD-10-CM

## 2020-07-27 DIAGNOSIS — Z72.0 TOBACCO USE: ICD-10-CM

## 2020-07-27 PROBLEM — H65.03 BILATERAL ACUTE SEROUS OTITIS MEDIA: Status: RESOLVED | Noted: 2018-09-18 | Resolved: 2020-07-27

## 2020-07-27 PROBLEM — H93.13 TINNITUS OF BOTH EARS: Status: RESOLVED | Noted: 2018-09-18 | Resolved: 2020-07-27

## 2020-07-27 PROCEDURE — 3077F SYST BP >= 140 MM HG: CPT | Performed by: FAMILY MEDICINE

## 2020-07-27 PROCEDURE — 3079F DIAST BP 80-89 MM HG: CPT | Performed by: FAMILY MEDICINE

## 2020-07-27 PROCEDURE — 3008F BODY MASS INDEX DOCD: CPT | Performed by: FAMILY MEDICINE

## 2020-07-27 PROCEDURE — 99396 PREV VISIT EST AGE 40-64: CPT | Performed by: FAMILY MEDICINE

## 2020-07-27 NOTE — PROGRESS NOTES
HPI:    Patient ID: Jose Daniel Braga is a 47year old male.     HPI  Patient presents with:  Physical: annual physical     Past Medical History:   Diagnosis Date   • Anxiety state    • Back problem     lower back fusion 2 years ago   • Colitis    • Diverticul person, place, and time. He appears well-developed and well-nourished. HENT:   Head: Normocephalic.    Right Ear: Tympanic membrane, external ear and ear canal normal.   Left Ear: Tympanic membrane, external ear and ear canal normal.   Nose: Nose normal. PSA (Screening) [E]      Urinalysis, Routine [E]      Meds This Visit:  Requested Prescriptions      No prescriptions requested or ordered in this encounter       Imaging & Referrals:  CT CALCIUM SCORING       UL#[id number]

## 2020-08-20 RX ORDER — LISINOPRIL 20 MG/1
TABLET ORAL
Qty: 90 TABLET | Refills: 1 | Status: SHIPPED | OUTPATIENT
Start: 2020-08-20 | End: 2021-03-13

## 2020-10-02 ENCOUNTER — LAB ENCOUNTER (OUTPATIENT)
Dept: LAB | Facility: HOSPITAL | Age: 50
End: 2020-10-02
Attending: FAMILY MEDICINE
Payer: COMMERCIAL

## 2020-10-02 DIAGNOSIS — Z00.00 ROUTINE GENERAL MEDICAL EXAMINATION AT A HEALTH CARE FACILITY: ICD-10-CM

## 2020-10-02 PROCEDURE — 36415 COLL VENOUS BLD VENIPUNCTURE: CPT

## 2020-10-02 PROCEDURE — 81001 URINALYSIS AUTO W/SCOPE: CPT

## 2020-10-02 PROCEDURE — 80053 COMPREHEN METABOLIC PANEL: CPT

## 2020-10-02 PROCEDURE — 80061 LIPID PANEL: CPT

## 2020-10-02 PROCEDURE — 85025 COMPLETE CBC W/AUTO DIFF WBC: CPT

## 2020-10-05 ENCOUNTER — HOSPITAL ENCOUNTER (OUTPATIENT)
Dept: CT IMAGING | Facility: HOSPITAL | Age: 50
Discharge: HOME | End: 2020-10-05
Attending: FAMILY MEDICINE

## 2020-10-05 DIAGNOSIS — E78.00 HYPERCHOLESTEROLEMIA: ICD-10-CM

## 2020-10-05 DIAGNOSIS — Z00.00 ROUTINE GENERAL MEDICAL EXAMINATION AT A HEALTH CARE FACILITY: ICD-10-CM

## 2020-10-13 RX ORDER — FENOFIBRATE 150 MG/1
1 CAPSULE ORAL DAILY
Qty: 90 CAPSULE | Refills: 3 | Status: SHIPPED | OUTPATIENT
Start: 2020-10-13 | End: 2021-10-15

## 2020-10-26 ENCOUNTER — OFFICE VISIT (OUTPATIENT)
Dept: FAMILY MEDICINE CLINIC | Facility: CLINIC | Age: 50
End: 2020-10-26
Payer: COMMERCIAL

## 2020-10-26 VITALS
HEIGHT: 71 IN | SYSTOLIC BLOOD PRESSURE: 130 MMHG | DIASTOLIC BLOOD PRESSURE: 69 MMHG | HEART RATE: 70 BPM | WEIGHT: 223 LBS | BODY MASS INDEX: 31.22 KG/M2

## 2020-10-26 DIAGNOSIS — E78.00 HYPERCHOLESTEROLEMIA: Primary | ICD-10-CM

## 2020-10-26 DIAGNOSIS — F41.9 ANXIETY: ICD-10-CM

## 2020-10-26 DIAGNOSIS — Z72.0 TOBACCO USE: ICD-10-CM

## 2020-10-26 DIAGNOSIS — I10 ESSENTIAL HYPERTENSION: ICD-10-CM

## 2020-10-26 PROCEDURE — 3078F DIAST BP <80 MM HG: CPT | Performed by: FAMILY MEDICINE

## 2020-10-26 PROCEDURE — 3075F SYST BP GE 130 - 139MM HG: CPT | Performed by: FAMILY MEDICINE

## 2020-10-26 PROCEDURE — 99214 OFFICE O/P EST MOD 30 MIN: CPT | Performed by: FAMILY MEDICINE

## 2020-10-26 PROCEDURE — 3008F BODY MASS INDEX DOCD: CPT | Performed by: FAMILY MEDICINE

## 2020-10-26 RX ORDER — ALPRAZOLAM 0.5 MG/1
0.5 TABLET ORAL 2 TIMES DAILY PRN
Qty: 20 TABLET | Refills: 2 | Status: SHIPPED | OUTPATIENT
Start: 2020-10-26 | End: 2020-10-29

## 2020-10-26 RX ORDER — LOVASTATIN 10 MG/1
10 TABLET ORAL DAILY
Qty: 90 TABLET | Refills: 1 | Status: SHIPPED | OUTPATIENT
Start: 2020-10-26 | End: 2021-10-21

## 2020-10-26 NOTE — PROGRESS NOTES
HPI:    Patient ID: Hakeem Morales is a 47year old male. HPI  Patient presents with:  Lab Results: follow up for lab results   having anxiety due to family members with illnesses. Had CT chest calcium score done.    Review of Systems   Constitutional: N Visit:  Requested Prescriptions     Signed Prescriptions Disp Refills   • Lovastatin 10 MG Oral Tab 90 tablet 1     Sig: Take 1 tablet (10 mg total) by mouth daily.    • ALPRAZolam 0.5 MG Oral Tab 20 tablet 2     Sig: Take 1 tablet (0.5 mg total) by mouth 2

## 2020-10-28 ENCOUNTER — TELEPHONE (OUTPATIENT)
Dept: FAMILY MEDICINE CLINIC | Facility: CLINIC | Age: 50
End: 2020-10-28

## 2020-10-28 NOTE — TELEPHONE ENCOUNTER
[facility] [phone] states that they received a script for alprazolam 0.5 mg and they do not have them in stock. They do have the 0.25 mg in stock does the doctor want to change Milligrams?      Current Outpatient Medications   Medication Sig Disp

## 2020-10-29 RX ORDER — ALPRAZOLAM 1 MG/1
0.5 TABLET ORAL 2 TIMES DAILY PRN
Qty: 30 TABLET | Refills: 2 | Status: SHIPPED | OUTPATIENT
Start: 2020-10-29 | End: 2022-01-17

## 2020-11-09 ENCOUNTER — HOSPITAL ENCOUNTER (EMERGENCY)
Facility: HOSPITAL | Age: 50
Discharge: HOME | End: 2020-11-09
Attending: EMERGENCY MEDICINE
Payer: COMMERCIAL

## 2020-11-09 VITALS
HEIGHT: 71 IN | DIASTOLIC BLOOD PRESSURE: 78 MMHG | OXYGEN SATURATION: 97 % | SYSTOLIC BLOOD PRESSURE: 130 MMHG | HEART RATE: 59 BPM | TEMPERATURE: 98 F | BODY MASS INDEX: 30.8 KG/M2 | RESPIRATION RATE: 14 BRPM | WEIGHT: 220 LBS

## 2020-11-09 DIAGNOSIS — R19.7 DIARRHEA, UNSPECIFIED TYPE: Primary | ICD-10-CM

## 2020-11-09 PROCEDURE — 85025 COMPLETE CBC W/AUTO DIFF WBC: CPT | Performed by: EMERGENCY MEDICINE

## 2020-11-09 PROCEDURE — 80048 BASIC METABOLIC PNL TOTAL CA: CPT | Performed by: EMERGENCY MEDICINE

## 2020-11-09 PROCEDURE — 96372 THER/PROPH/DIAG INJ SC/IM: CPT

## 2020-11-09 PROCEDURE — 99284 EMERGENCY DEPT VISIT MOD MDM: CPT

## 2020-11-09 PROCEDURE — 96374 THER/PROPH/DIAG INJ IV PUSH: CPT

## 2020-11-09 PROCEDURE — 83690 ASSAY OF LIPASE: CPT | Performed by: EMERGENCY MEDICINE

## 2020-11-09 PROCEDURE — 80076 HEPATIC FUNCTION PANEL: CPT | Performed by: EMERGENCY MEDICINE

## 2020-11-09 RX ORDER — DICYCLOMINE HYDROCHLORIDE 10 MG/ML
20 INJECTION INTRAMUSCULAR ONCE
Status: COMPLETED | OUTPATIENT
Start: 2020-11-09 | End: 2020-11-09

## 2020-11-09 RX ORDER — ONDANSETRON 2 MG/ML
4 INJECTION INTRAMUSCULAR; INTRAVENOUS ONCE
Status: COMPLETED | OUTPATIENT
Start: 2020-11-09 | End: 2020-11-09

## 2020-11-09 RX ORDER — ONDANSETRON 4 MG/1
4 TABLET, ORALLY DISINTEGRATING ORAL EVERY 4 HOURS PRN
Qty: 10 TABLET | Refills: 0 | Status: SHIPPED | OUTPATIENT
Start: 2020-11-09 | End: 2020-11-16

## 2020-11-09 RX ORDER — DICYCLOMINE HCL 20 MG
20 TABLET ORAL 4 TIMES DAILY PRN
Qty: 40 TABLET | Refills: 0 | Status: SHIPPED | OUTPATIENT
Start: 2020-11-09 | End: 2020-11-19

## 2020-11-09 NOTE — ED INITIAL ASSESSMENT (HPI)
Awake 30 minutes prior to arrival with generalized abd pain with associated nausea/vomiting/diarrhea

## 2020-11-09 NOTE — ED NOTES
Discharged home into care of family with plan to follow up with PCP as indicated Alert and interactive Denies abd pain No V/D while in ED Hemodynamically stable Ambulates to exit with steady gait

## 2020-11-09 NOTE — ED PROVIDER NOTES
Patient Seen in: [facility] Emergency Department    History   Patient presents with:  Abdomen/Flank Pain    Stated Complaint: Abd pain with associated emesis/diarrhea with near syncopy per significant other     HPI    48-year-old male with past medi HCl 20 MG Oral Tab,  Take 1 tablet (20 mg total) by mouth 4 (four) times daily as needed (For abdominal pain/cramping and/or diarrhea. ).    ondansetron 4 MG Oral Tablet Dispersible,  Take 1 tablet (4 mg total) by mouth every 4 (four) hours as needed for [employer] negative except as noted above. PSFH elements reviewed from today and agreed except as otherwise stated in HPI.     Physical Exam     ED Triage Vitals   BP 11/09/20 0437 119/72   Pulse 11/09/20 0437 59   Resp 11/09/20 0437 16   Temp 11/09/20 0437 97.8 °F DIFFERENTIAL            MDM     DIFFERENTIAL DIAGNOSIS: After history and physical exam differential diagnosis [id number] but is not limited to gastroenteritis, diverticulitis, colitis, electrolyte derangement, DANIELA.     Pulse ox: 95% :Normal on RA, as interp

## 2020-11-23 ENCOUNTER — TELEMEDICINE (OUTPATIENT)
Dept: FAMILY MEDICINE CLINIC | Facility: CLINIC | Age: 50
End: 2020-11-23

## 2020-11-23 ENCOUNTER — APPOINTMENT (OUTPATIENT)
Dept: LAB | Facility: HOSPITAL | Age: 50
End: 2020-11-23
Attending: FAMILY MEDICINE
Payer: COMMERCIAL

## 2020-11-23 DIAGNOSIS — R05.9 COUGH: Primary | ICD-10-CM

## 2020-11-23 DIAGNOSIS — R05.9 COUGH: ICD-10-CM

## 2020-11-23 PROCEDURE — 99213 OFFICE O/P EST LOW 20 MIN: CPT | Performed by: FAMILY MEDICINE

## 2020-11-23 NOTE — PROGRESS NOTES
Virtual Telephone Check-In    Zena Diallo verbally consents to a Virtual/Telephone Check-In visit on 11/23/20. Patient has been referred to the [facility] website at [email or web address] to review the yearly Consent to Treat document.     Patient Amita

## 2020-11-25 ENCOUNTER — TELEPHONE (OUTPATIENT)
Dept: FAMILY MEDICINE CLINIC | Facility: CLINIC | Age: 50
End: 2020-11-25

## 2020-11-25 NOTE — TELEPHONE ENCOUNTER
Pt had tele visit 11/23/20 with Dr Liliane Young , neg covid, more congested/coughing while speaking, yellow sputum, first day no appetite, sleeping a lot, taking otc cough syrup,vit d/zinc

## 2020-11-25 NOTE — TELEPHONE ENCOUNTER
Spoke with patient (verified name and ), advised Kristy Underwood note and verbalized understanding. patient prefers  Antibiotic, [patient sound congested, allergy and pharmacy verified. Denied using any nasal spray. Informed that office is close now, iván perez

## 2020-11-25 NOTE — TELEPHONE ENCOUNTER
----- Message from Horace Mortimer. Teruel sent at 11/25/2020  5:24 AM CST -----  Regarding: Visit Follow-up Question  Contact: [phone]  I am still really congested head and chest with a bothersome cough

## 2020-11-26 ENCOUNTER — HOSPITAL ENCOUNTER (EMERGENCY)
Facility: HOSPITAL | Age: 50
Discharge: HOME | End: 2020-11-26
Attending: EMERGENCY MEDICINE
Payer: COMMERCIAL

## 2020-11-26 ENCOUNTER — APPOINTMENT (OUTPATIENT)
Dept: GENERAL RADIOLOGY | Facility: HOSPITAL | Age: 50
End: 2020-11-26
Attending: EMERGENCY MEDICINE
Payer: COMMERCIAL

## 2020-11-26 VITALS
OXYGEN SATURATION: 94 % | TEMPERATURE: 98 F | RESPIRATION RATE: 20 BRPM | BODY MASS INDEX: 30.8 KG/M2 | DIASTOLIC BLOOD PRESSURE: 82 MMHG | HEIGHT: 71 IN | SYSTOLIC BLOOD PRESSURE: 131 MMHG | HEART RATE: 56 BPM | WEIGHT: 220 LBS

## 2020-11-26 DIAGNOSIS — J40 BRONCHITIS: Primary | ICD-10-CM

## 2020-11-26 PROCEDURE — 94640 AIRWAY INHALATION TREATMENT: CPT

## 2020-11-26 PROCEDURE — 99284 EMERGENCY DEPT VISIT MOD MDM: CPT

## 2020-11-26 PROCEDURE — 71045 X-RAY EXAM CHEST 1 VIEW: CPT | Performed by: EMERGENCY MEDICINE

## 2020-11-26 RX ORDER — IPRATROPIUM BROMIDE AND ALBUTEROL SULFATE 2.5; .5 MG/3ML; MG/3ML
3 SOLUTION RESPIRATORY (INHALATION) ONCE
Status: COMPLETED | OUTPATIENT
Start: 2020-11-26 | End: 2020-11-26

## 2020-11-26 RX ORDER — PREDNISONE 20 MG/1
40 TABLET ORAL DAILY
Qty: 8 TABLET | Refills: 0 | Status: SHIPPED | OUTPATIENT
Start: 2020-11-26 | End: 2020-11-30

## 2020-11-26 RX ORDER — ALBUTEROL SULFATE 90 UG/1
2 AEROSOL, METERED RESPIRATORY (INHALATION) EVERY 4 HOURS PRN
Qty: 1 INHALER | Refills: 0 | Status: SHIPPED | OUTPATIENT
Start: 2020-11-26 | End: 2020-12-26

## 2020-11-26 RX ORDER — PREDNISONE 20 MG/1
60 TABLET ORAL ONCE
Status: COMPLETED | OUTPATIENT
Start: 2020-11-26 | End: 2020-11-26

## 2020-11-26 NOTE — ED NOTES
Pt states  Has been feeling sick for the past week. States headache, cough, sinus and chest congestion, ear pain and intermittent shortness of breath. States had a negative COVID test a few days ago. Denies known COVID contacts. Denies fevers.

## 2020-11-26 NOTE — ED INITIAL ASSESSMENT (HPI)
Pt states he feels sick for a week,pt's covid test came back negative, +productive cough, +fatigue, intermittent headache, denies fevers. Pt reports chest congestion and sinus congestion.

## 2020-11-27 NOTE — ED PROVIDER NOTES
Patient Seen in: [facility] Emergency Department    History   Patient presents with:  Cough/URI    Stated Complaint: Venessa Castro     HPI    46 yo M with pMH anxiety, HTN, HL presenting for evaluation of one week of intermittently productive cough a tablets (40 mg total) by mouth daily for 4 days. ALPRAZolam 1 MG Oral Tab,  Take 0.5 tablets (0.5 mg total) by mouth 2 (two) times daily as needed. Lovastatin 10 MG Oral Tab,  Take 1 tablet (10 mg total) by mouth daily.    Fenofibrate 150 MG Oral Cap, Triage Vitals [11/26/20 1114]   BP (!) 151/97   Pulse 74   Resp 16   Temp 97.7 °F (36.5 °C)   Temp src Oral   SpO2 94 %   O2 Device None (Room air)       Current:/82   Pulse 56   Temp 97.7 °F (36.5 °C) (Oral)   Resp 20   Ht 180.3 cm (5' 11\")   Wt 99 MDM     DIFFERENTIAL DIAGNOSIS: After history and physical exam differential diagnosis includes but is not limited to COVID, bronchitis, PNA.     Pulse ox: 94%:Normal on RA, as interpreted by myself    Evaluation for cough/URI symptoms withou

## 2020-12-02 ENCOUNTER — OFFICE VISIT (OUTPATIENT)
Dept: OTOLARYNGOLOGY | Facility: CLINIC | Age: 50
End: 2020-12-02
Payer: COMMERCIAL

## 2020-12-02 ENCOUNTER — OFFICE VISIT (OUTPATIENT)
Dept: AUDIOLOGY | Facility: CLINIC | Age: 50
End: 2020-12-02
Payer: COMMERCIAL

## 2020-12-02 VITALS
HEIGHT: 71 IN | BODY MASS INDEX: 30.8 KG/M2 | TEMPERATURE: 97 F | DIASTOLIC BLOOD PRESSURE: 80 MMHG | SYSTOLIC BLOOD PRESSURE: 118 MMHG | WEIGHT: 220 LBS

## 2020-12-02 DIAGNOSIS — H65.93 MEE (MIDDLE EAR EFFUSION), BILATERAL: Primary | ICD-10-CM

## 2020-12-02 DIAGNOSIS — H90.3 SENSORINEURAL HEARING LOSS (SNHL) OF BOTH EARS: ICD-10-CM

## 2020-12-02 DIAGNOSIS — H93.13 TINNITUS AURIUM, BILATERAL: ICD-10-CM

## 2020-12-02 DIAGNOSIS — H90.3 SENSORINEURAL HEARING LOSS, BILATERAL: Primary | ICD-10-CM

## 2020-12-02 PROCEDURE — 99213 OFFICE O/P EST LOW 20 MIN: CPT | Performed by: OTOLARYNGOLOGY

## 2020-12-02 PROCEDURE — 92557 COMPREHENSIVE HEARING TEST: CPT | Performed by: AUDIOLOGIST

## 2020-12-02 PROCEDURE — 3008F BODY MASS INDEX DOCD: CPT | Performed by: OTOLARYNGOLOGY

## 2020-12-02 PROCEDURE — 69433 CREATE EARDRUM OPENING: CPT | Performed by: OTOLARYNGOLOGY

## 2020-12-02 PROCEDURE — 3074F SYST BP LT 130 MM HG: CPT | Performed by: OTOLARYNGOLOGY

## 2020-12-02 PROCEDURE — 3079F DIAST BP 80-89 MM HG: CPT | Performed by: OTOLARYNGOLOGY

## 2020-12-02 NOTE — PATIENT INSTRUCTIONS
How Hearing Aids Can Help You     Losing your hearing can be frustrating. But hearing aids can help you hear what Justice Melendezing been missing. Not everyone who has hearing loss needs hearing aids.  Hearing aids will most likely help you if your hearing loss:  · K © 9826-4219 [employer] [address]. [address] [facility], [facility] [place]. All rights reserved. This information is not intended as a substitute for professional medical care. Always follow your healthcare professional's instructions.

## 2020-12-02 NOTE — PROGRESS NOTES
AUDIOGRAM     Meño Howell was referred for testing by [facility] for decreased hearing in both ears. 12/21/1970  [id number]      Otoscopic inspection: right ear no cerumen; left ear no cerumen.        Tests/Procedures  Patient was tested via  standar

## 2020-12-02 NOTE — PROGRESS NOTES
Clifton Severance is a 47year old male.  Patient presents with:  Ear Problem: Patient Presents with Ringing in both ears,muffled hearing, unbalanced for 1 week, per pt did have a sinus issue last week  negative for covid         HISTORY OF PRESENT ILLNESS replacement this summer he had a spinal fusion cervical about 9 months ago and currently his mother is in the hospital at [place] with a abdominal aortic aneurysm and she has been having a rough time of it.   He actually was so anxiety ridden that he actuall bowel movement he will get bleeding more left-sided than right-sided it seems to gush fairly significantly. He is not had any treatment for this and is not on any blood thinners currently.   He still has the tinnitus and is managing with masking  9/4/2019 Cigarettes        Quit date: 11/17/2017        Years since quitting: 3.0      Smokeless tobacco: Never Used    Substance and Sexual Activity      Alcohol use:  Yes        Alcohol/week: 1.0 standard drinks        Types: 1 Cans of beer per week        Comment • Back problem     lower back fusion 2 years ago   • Colitis    • Diverticulosis of large intestine    • Esophageal reflux    • Essential hypertension    • Headache     per [employer]:  \"Headaches\"   • High blood pressure    • High cholesterol    • Hyperl masses.    Constitutional Normal Overall appearance - Normal.   Head/Face Normal Facial features - Normal. Eyebrows - Normal. Skull - Normal.   nose  Normal External nose - Normal.    Neurological Normal Memory - Normal. Cranial nerves - Cranial nerves II t TABLET(50 MG) BY MOUTH DAILY AS NEEDED FOR ERECTILE DYSFUNCTION, Disp: 10 tablet, Rfl: 10  •  Omeprazole 40 MG Oral Capsule Delayed Release, Take 1 capsule (40 mg total) by mouth daily.  Before a meal, Disp: 90 capsule, Rfl: 3  •  FLUOXETINE HCL 20 MG Oral

## 2020-12-08 ENCOUNTER — TELEMEDICINE (OUTPATIENT)
Dept: FAMILY MEDICINE CLINIC | Facility: CLINIC | Age: 50
End: 2020-12-08
Payer: COMMERCIAL

## 2020-12-08 ENCOUNTER — TELEPHONE (OUTPATIENT)
Dept: FAMILY MEDICINE CLINIC | Facility: CLINIC | Age: 50
End: 2020-12-08

## 2020-12-08 DIAGNOSIS — J01.01 ACUTE RECURRENT MAXILLARY SINUSITIS: Primary | ICD-10-CM

## 2020-12-08 PROCEDURE — 99441 PHONE E/M BY PHYS 5-10 MIN: CPT | Performed by: FAMILY MEDICINE

## 2020-12-08 RX ORDER — PREDNISONE 10 MG/1
TABLET ORAL
Qty: 18 TABLET | Refills: 0 | Status: SHIPPED | OUTPATIENT
Start: 2020-12-08 | End: 2021-05-06 | Stop reason: ALTCHOICE

## 2020-12-08 RX ORDER — FLUTICASONE PROPIONATE 50 MCG
2 SPRAY, SUSPENSION (ML) NASAL DAILY
Qty: 1 BOTTLE | Refills: 3 | Status: SHIPPED | OUTPATIENT
Start: 2020-12-08 | End: 2021-07-08

## 2020-12-08 RX ORDER — LEVOFLOXACIN 500 MG/1
500 TABLET, FILM COATED ORAL DAILY
Qty: 10 TABLET | Refills: 0 | Status: SHIPPED | OUTPATIENT
Start: 2020-12-08 | End: 2020-12-18

## 2020-12-08 NOTE — PROGRESS NOTES
Virtual Telephone Check-In    Jennifer Friday verbally consents to a Virtual/Telephone Check-In visit on 12/08/20. Patient has been referred to the [facility] website at [email or web address] to review the yearly Consent to Treat document.     Patient Khalif Leon

## 2020-12-08 NOTE — TELEPHONE ENCOUNTER
Heather Julio pt was in the ER on 11/26/2020 and was Dx with Bronchitis. Pt was given prednisone and a albuterol inhaler. Pt still feels the same as when he went to ER. He has a cough and congestion. Pt has tested negative for covid.   Pt will like to know delicia

## 2020-12-23 ENCOUNTER — HOSPITAL ENCOUNTER (EMERGENCY)
Facility: HOSPITAL | Age: 50
Discharge: HOME | End: 2020-12-23
Attending: EMERGENCY MEDICINE
Payer: COMMERCIAL

## 2020-12-23 ENCOUNTER — APPOINTMENT (OUTPATIENT)
Dept: GENERAL RADIOLOGY | Facility: HOSPITAL | Age: 50
End: 2020-12-23
Payer: COMMERCIAL

## 2020-12-23 VITALS
HEIGHT: 71 IN | WEIGHT: 215 LBS | BODY MASS INDEX: 30.1 KG/M2 | DIASTOLIC BLOOD PRESSURE: 79 MMHG | OXYGEN SATURATION: 95 % | HEART RATE: 58 BPM | SYSTOLIC BLOOD PRESSURE: 141 MMHG | TEMPERATURE: 98 F | RESPIRATION RATE: 18 BRPM

## 2020-12-23 DIAGNOSIS — J44.1 COPD EXACERBATION (HCC): Primary | ICD-10-CM

## 2020-12-23 PROCEDURE — 80048 BASIC METABOLIC PNL TOTAL CA: CPT | Performed by: EMERGENCY MEDICINE

## 2020-12-23 PROCEDURE — 84484 ASSAY OF TROPONIN QUANT: CPT | Performed by: EMERGENCY MEDICINE

## 2020-12-23 PROCEDURE — 93005 ELECTROCARDIOGRAM TRACING: CPT

## 2020-12-23 PROCEDURE — 85025 COMPLETE CBC W/AUTO DIFF WBC: CPT | Performed by: EMERGENCY MEDICINE

## 2020-12-23 PROCEDURE — 96374 THER/PROPH/DIAG INJ IV PUSH: CPT

## 2020-12-23 PROCEDURE — 99285 EMERGENCY DEPT VISIT HI MDM: CPT

## 2020-12-23 PROCEDURE — 85379 FIBRIN DEGRADATION QUANT: CPT | Performed by: EMERGENCY MEDICINE

## 2020-12-23 PROCEDURE — 71045 X-RAY EXAM CHEST 1 VIEW: CPT | Performed by: EMERGENCY MEDICINE

## 2020-12-23 PROCEDURE — 93010 ELECTROCARDIOGRAM REPORT: CPT | Performed by: EMERGENCY MEDICINE

## 2020-12-23 PROCEDURE — 94640 AIRWAY INHALATION TREATMENT: CPT

## 2020-12-23 RX ORDER — METHYLPREDNISOLONE SODIUM SUCCINATE 125 MG/2ML
125 INJECTION, POWDER, LYOPHILIZED, FOR SOLUTION INTRAMUSCULAR; INTRAVENOUS ONCE
Status: COMPLETED | OUTPATIENT
Start: 2020-12-23 | End: 2020-12-23

## 2020-12-23 RX ORDER — PREDNISONE 20 MG/1
40 TABLET ORAL DAILY
Qty: 10 TABLET | Refills: 0 | Status: SHIPPED | OUTPATIENT
Start: 2020-12-23 | End: 2020-12-28

## 2020-12-23 RX ORDER — IPRATROPIUM BROMIDE AND ALBUTEROL SULFATE 2.5; .5 MG/3ML; MG/3ML
3 SOLUTION RESPIRATORY (INHALATION) ONCE
Status: COMPLETED | OUTPATIENT
Start: 2020-12-23 | End: 2020-12-23

## 2020-12-23 RX ORDER — ALBUTEROL SULFATE 90 UG/1
2 AEROSOL, METERED RESPIRATORY (INHALATION) EVERY 4 HOURS PRN
Qty: 1 INHALER | Refills: 0 | Status: SHIPPED | OUTPATIENT
Start: 2020-12-23 | End: 2021-01-22

## 2020-12-23 NOTE — ED INITIAL ASSESSMENT (HPI)
Pt to the ER for chest pain for the past week.  Pt states that pain intensified today and he is feeling more short of breath than normal.

## 2020-12-23 NOTE — ED PROVIDER NOTES
Patient Seen in: [facility] AND [facility] Emergency Department      History   Patient presents with:  Chest Pain Angina    Stated Complaint: cp    HPI    Patient is a 25-year-old male with history of hypertension who complains of chest pain.   He states that bro Types: Cigarettes        Quit date: 11/17/2017        Years since quitting: 3.1      Smokeless tobacco: Never Used    Alcohol use:  Yes      Alcohol/week: 1.0 standard drinks      Types: 1 Cans of beer per week      Comment: 2 beers, weekly    Drug use: Yes Normal mood and affect. Behavior is normal. Judgment and thought content normal.   Nursing note and vitals reviewed.           ED Course     Labs Reviewed   BASIC METABOLIC PANEL (8) - Abnormal; Notable for the following components:       Result Value    Gl 185[address]  [phone]    Schedule an appointment as soon as possible for a visit in 2 days            Medications Prescribed:  Current Discharge Medication List    START taking these medications    ! ! predniSONE 20 MG Oral Tab  Take 2 tablets (40 mg total) b

## 2020-12-29 ENCOUNTER — OFFICE VISIT (OUTPATIENT)
Dept: FAMILY MEDICINE CLINIC | Facility: CLINIC | Age: 50
End: 2020-12-29
Payer: COMMERCIAL

## 2020-12-29 VITALS
BODY MASS INDEX: 31.36 KG/M2 | WEIGHT: 224 LBS | OXYGEN SATURATION: 97 % | HEIGHT: 71 IN | DIASTOLIC BLOOD PRESSURE: 83 MMHG | HEART RATE: 75 BPM | SYSTOLIC BLOOD PRESSURE: 137 MMHG

## 2020-12-29 DIAGNOSIS — J41.0 SIMPLE CHRONIC BRONCHITIS (HCC): Primary | ICD-10-CM

## 2020-12-29 PROCEDURE — 3075F SYST BP GE 130 - 139MM HG: CPT | Performed by: FAMILY MEDICINE

## 2020-12-29 PROCEDURE — 3008F BODY MASS INDEX DOCD: CPT | Performed by: FAMILY MEDICINE

## 2020-12-29 PROCEDURE — 3079F DIAST BP 80-89 MM HG: CPT | Performed by: FAMILY MEDICINE

## 2020-12-29 PROCEDURE — 99213 OFFICE O/P EST LOW 20 MIN: CPT | Performed by: FAMILY MEDICINE

## 2020-12-29 RX ORDER — FLUTICASONE PROPIONATE AND SALMETEROL 250; 50 UG/1; UG/1
1 POWDER RESPIRATORY (INHALATION) EVERY 12 HOURS SCHEDULED
Qty: 1 PACKAGE | Refills: 1 | Status: SHIPPED | OUTPATIENT
Start: 2020-12-29

## 2021-01-05 NOTE — PROGRESS NOTES
HPI:    Patient ID: Jolene Moore is a 45year old male. HPI  Patient presents with:  ER F/U: went to [address] [place] [address] on 12-23-20 for chest pain still gets pain on and off worst when coughing   still present but not as severe. Afebrile. Not much cough.    Review of appears well-developed and well-nourished. Cardiovascular: Normal rate and regular rhythm. Pulmonary/Chest: Effort normal. He has wheezes. Vitals reviewed.              ASSESSMENT/PLAN:   Simple chronic bronchitis (hcc)  (primary encounter diagnosis)

## 2021-01-21 NOTE — TELEPHONE ENCOUNTER
Patient needs rrefill medication :Omeprazole 40 MG Oral Capsule Delayed Release     He is out of town:  Brianna [address] to [place] in [place], [place],    [phone]

## 2021-01-22 RX ORDER — OMEPRAZOLE 40 MG/1
CAPSULE, DELAYED RELEASE ORAL
Qty: 90 CAPSULE | Refills: 3 | Status: SHIPPED | OUTPATIENT
Start: 2021-01-22 | End: 2022-01-17

## 2021-01-22 RX ORDER — OMEPRAZOLE 40 MG/1
40 CAPSULE, DELAYED RELEASE ORAL DAILY
Qty: 90 CAPSULE | Refills: 3 | Status: SHIPPED | OUTPATIENT
Start: 2021-01-22 | End: 2021-05-06

## 2021-01-22 NOTE — TELEPHONE ENCOUNTER
Wife asking if Dr. Bella Espinal filled patient's script yet. Informed Omeprazole was filled, but sent to [facility] in [facility], not [place]. Resending script to correct pharmacy.

## 2021-03-01 RX ORDER — FLUOXETINE HYDROCHLORIDE 20 MG/1
CAPSULE ORAL
Qty: 30 CAPSULE | Refills: 11 | Status: SHIPPED | OUTPATIENT
Start: 2021-03-01 | End: 2022-01-17

## 2021-03-01 NOTE — TELEPHONE ENCOUNTER
Dr Laura Weaver patient last visit on 12/9/20 middle ear effusion ,ringing ears,asking for refill ,please advise.

## 2021-03-10 ENCOUNTER — OFFICE VISIT (OUTPATIENT)
Dept: OTOLARYNGOLOGY | Facility: CLINIC | Age: 51
End: 2021-03-10
Payer: COMMERCIAL

## 2021-03-10 VITALS
TEMPERATURE: 97 F | BODY MASS INDEX: 30.8 KG/M2 | WEIGHT: 220 LBS | SYSTOLIC BLOOD PRESSURE: 140 MMHG | DIASTOLIC BLOOD PRESSURE: 89 MMHG | HEIGHT: 71 IN

## 2021-03-10 DIAGNOSIS — H65.92 MIDDLE EAR EFFUSION, LEFT: Primary | ICD-10-CM

## 2021-03-10 PROCEDURE — 3008F BODY MASS INDEX DOCD: CPT | Performed by: OTOLARYNGOLOGY

## 2021-03-10 PROCEDURE — 3079F DIAST BP 80-89 MM HG: CPT | Performed by: OTOLARYNGOLOGY

## 2021-03-10 PROCEDURE — 99213 OFFICE O/P EST LOW 20 MIN: CPT | Performed by: OTOLARYNGOLOGY

## 2021-03-10 PROCEDURE — 69433 CREATE EARDRUM OPENING: CPT | Performed by: OTOLARYNGOLOGY

## 2021-03-10 PROCEDURE — 3077F SYST BP >= 140 MM HG: CPT | Performed by: OTOLARYNGOLOGY

## 2021-03-10 NOTE — PROGRESS NOTES
Rai Lucas is a 45year old male. Patient presents with:  Ear Problem: c/o left ear pain for 4 days. HISTORY OF PRESENT ILLNESS    8/4/16 Here for evaluation of right sided pain and drainage. It started Sunday with some ear pressure.   He then w Joselyn with a abdominal aortic aneurysm and she has been having a rough time of it.   He actually was so anxiety ridden that he actually passed out when he found out some news about his mother in the hospital.  Certainly sounds like it was a vasovagal episo any treatment for this and is not on any blood thinners currently.   He still has the tinnitus and is managing with masking  9/4/2019  He was in [place] several weeks ago was a lot of cigarette exposure he does still smoke but he had more cigarette smoke or d of beer per week        Comment: 2 beers, weekly      Drug use: Yes        Types: Cannabis      Sexual activity: Not on file    Other Topics      Concerns:         Service: Not Asked        Blood Transfusions: Not Asked        Caffeine Concern: Yes Anxiety state    • Back problem     lower back fusion 2 years ago   • Colitis    • Diverticulosis of large intestine    • Esophageal reflux    • Essential hypertension    • Headache     per [employer]:  \"Headaches\"   • High blood pressure    • High choleste lesions bruises or masses.    Constitutional Normal Overall appearance - Normal.   Head/Face Normal Facial features - Normal. Eyebrows - Normal. Skull - Normal.   nose  Normal External nose - Normal.    Neurological Normal Memory - Normal. Cranial nerves - Oral Cap, Take 1 capsule by mouth daily. , Disp: 90 capsule, Rfl: 3  •  LISINOPRIL 20 MG Oral Tab, TAKE 1 TABLET(20 MG) BY MOUTH EVERY DAY, Disp: 90 tablet, Rfl: 1  •  SILDENAFIL CITRATE 50 MG Oral Tab, TAKE 1 TABLET(50 MG) BY MOUTH DAILY AS NEEDED FOR EREC

## 2021-03-13 RX ORDER — LISINOPRIL 20 MG/1
TABLET ORAL
Qty: 90 TABLET | Refills: 1 | Status: SHIPPED | OUTPATIENT
Start: 2021-03-13 | End: 2021-10-15

## 2021-03-17 DIAGNOSIS — Z23 NEED FOR VACCINATION: ICD-10-CM

## 2021-03-19 ENCOUNTER — IMMUNIZATION (OUTPATIENT)
Dept: LAB | Age: 51
End: 2021-03-19
Attending: HOSPITALIST
Payer: COMMERCIAL

## 2021-03-19 DIAGNOSIS — Z23 NEED FOR VACCINATION: Primary | ICD-10-CM

## 2021-03-19 PROCEDURE — 0001A SARSCOV2 VAC 30MCG/0.3ML IM: CPT

## 2021-04-09 ENCOUNTER — IMMUNIZATION (OUTPATIENT)
Dept: LAB | Age: 51
End: 2021-04-09
Attending: HOSPITALIST
Payer: COMMERCIAL

## 2021-04-09 DIAGNOSIS — Z23 NEED FOR VACCINATION: Primary | ICD-10-CM

## 2021-04-09 PROCEDURE — 0002A SARSCOV2 VAC 30MCG/0.3ML IM: CPT

## 2021-05-05 ENCOUNTER — HOSPITAL ENCOUNTER (OUTPATIENT)
Age: 51
Discharge: HOME | End: 2021-05-05
Payer: COMMERCIAL

## 2021-05-05 ENCOUNTER — APPOINTMENT (OUTPATIENT)
Dept: GENERAL RADIOLOGY | Age: 51
End: 2021-05-05
Attending: NURSE PRACTITIONER
Payer: COMMERCIAL

## 2021-05-05 VITALS
RESPIRATION RATE: 20 BRPM | TEMPERATURE: 98 F | SYSTOLIC BLOOD PRESSURE: 149 MMHG | BODY MASS INDEX: 30.8 KG/M2 | DIASTOLIC BLOOD PRESSURE: 93 MMHG | HEIGHT: 71 IN | HEART RATE: 81 BPM | WEIGHT: 220 LBS | OXYGEN SATURATION: 99 %

## 2021-05-05 DIAGNOSIS — M25.562 KNEE PAIN, LEFT: Primary | ICD-10-CM

## 2021-05-05 PROCEDURE — 73560 X-RAY EXAM OF KNEE 1 OR 2: CPT | Performed by: NURSE PRACTITIONER

## 2021-05-05 PROCEDURE — 99213 OFFICE O/P EST LOW 20 MIN: CPT | Performed by: NURSE PRACTITIONER

## 2021-05-05 RX ORDER — CEPHALEXIN 500 MG/1
500 CAPSULE ORAL 4 TIMES DAILY
Qty: 40 CAPSULE | Refills: 0 | Status: SHIPPED | OUTPATIENT
Start: 2021-05-05 | End: 2021-05-15

## 2021-05-05 NOTE — ED PROVIDER NOTES
Patient presents with:  Knee Pain      HPI:     Sharon Chowdhury is a 45year old male who presents today with a chief complaint of pain in the left anterior knee that he woke up with this morning.   He states over the weekend he was building a dog house with Substance and Sexual Activity      Alcohol use:  Yes        Alcohol/week: 1.0 standard drinks        Types: 1 Cans of beer per week        Comment: 2 beers, weekly      Drug use: Yes        Types: Cannabis      Sexual activity: Not on file    Other Topics Signs Reviewed. Physical Exam:   Findings:  EXTREMITIES: no cyanosis or edema   Edema  Yes, only to the anterior left knee. The area is red, not warm to touch. Noncircumferential.  No rashes. The skin is intact. No calf redness, pain, or swelling. Dictated by (CST): Fausto Laguna MD on 5/05/2021 at 5:59 PM     Finalized by (CST): Fausto Laguna MD on 5/05/2021 at 6:00 PM          The differentials would be a left knee contusion versus cellulitis versus gout.     The patient is aware of the x-ray r

## 2021-05-06 ENCOUNTER — LAB ENCOUNTER (OUTPATIENT)
Dept: LAB | Age: 51
End: 2021-05-06
Attending: FAMILY MEDICINE
Payer: COMMERCIAL

## 2021-05-06 ENCOUNTER — TELEPHONE (OUTPATIENT)
Dept: INTERNAL MEDICINE CLINIC | Facility: CLINIC | Age: 51
End: 2021-05-06

## 2021-05-06 ENCOUNTER — OFFICE VISIT (OUTPATIENT)
Dept: FAMILY MEDICINE CLINIC | Facility: CLINIC | Age: 51
End: 2021-05-06
Payer: COMMERCIAL

## 2021-05-06 VITALS
DIASTOLIC BLOOD PRESSURE: 86 MMHG | BODY MASS INDEX: 32.06 KG/M2 | WEIGHT: 229 LBS | HEIGHT: 71 IN | SYSTOLIC BLOOD PRESSURE: 143 MMHG | HEART RATE: 58 BPM

## 2021-05-06 DIAGNOSIS — L03.116 CELLULITIS OF LEFT KNEE: ICD-10-CM

## 2021-05-06 DIAGNOSIS — L03.116 CELLULITIS OF LEFT KNEE: Primary | ICD-10-CM

## 2021-05-06 PROCEDURE — 85025 COMPLETE CBC W/AUTO DIFF WBC: CPT

## 2021-05-06 PROCEDURE — 36415 COLL VENOUS BLD VENIPUNCTURE: CPT

## 2021-05-06 PROCEDURE — 3008F BODY MASS INDEX DOCD: CPT | Performed by: FAMILY MEDICINE

## 2021-05-06 PROCEDURE — 3077F SYST BP >= 140 MM HG: CPT | Performed by: FAMILY MEDICINE

## 2021-05-06 PROCEDURE — 99214 OFFICE O/P EST MOD 30 MIN: CPT | Performed by: FAMILY MEDICINE

## 2021-05-06 PROCEDURE — 3079F DIAST BP 80-89 MM HG: CPT | Performed by: FAMILY MEDICINE

## 2021-05-06 PROCEDURE — 84550 ASSAY OF BLOOD/URIC ACID: CPT

## 2021-05-06 RX ORDER — HYDROCODONE BITARTRATE AND ACETAMINOPHEN 7.5; 325 MG/1; MG/1
1 TABLET ORAL EVERY 4 HOURS PRN
Qty: 30 TABLET | Refills: 0 | Status: SHIPPED | OUTPATIENT
Start: 2021-05-06 | End: 2021-06-05

## 2021-05-06 RX ORDER — IBUPROFEN 800 MG/1
800 TABLET ORAL 3 TIMES DAILY
Qty: 30 TABLET | Refills: 1 | Status: SHIPPED | OUTPATIENT
Start: 2021-05-06 | End: 2022-01-13

## 2021-05-06 NOTE — PROGRESS NOTES
HPI/Subjective:   Patient ID: Kevin Stuart is a 45year old male. HPI  Patient presents with:  Knee Pain: LT knee pain, was in UC on 5/5/2021  Treated for cellulitis and taking abx. No improvement so far.    History/Other:   Review of Systems   Constit warm.  Slight redness. Assessment & Plan:   Cellulitis of left knee  (primary encounter diagnosis)    Obtain lab. Add med. If high WBC may need to see ortho earlier to eval for joint infection rather than waiting.    Orders Placed This Encounter

## 2021-05-06 NOTE — TELEPHONE ENCOUNTER
Patient's spouse (on LIV) states patient was in urgent care yesterday for left knee pain. Patient is taking antibiotic cephalexin 500 mg 4 times daily. Patient was instructed to follow up with orthopedic, patient unable to get appointment until Monday.  Spo

## 2021-07-05 ENCOUNTER — APPOINTMENT (OUTPATIENT)
Dept: GENERAL RADIOLOGY | Age: 51
End: 2021-07-05
Attending: NURSE PRACTITIONER
Payer: COMMERCIAL

## 2021-07-05 ENCOUNTER — HOSPITAL ENCOUNTER (OUTPATIENT)
Age: 51
Discharge: HOME | End: 2021-07-05
Payer: COMMERCIAL

## 2021-07-05 VITALS
TEMPERATURE: 98 F | HEART RATE: 78 BPM | SYSTOLIC BLOOD PRESSURE: 109 MMHG | RESPIRATION RATE: 20 BRPM | DIASTOLIC BLOOD PRESSURE: 86 MMHG | OXYGEN SATURATION: 98 %

## 2021-07-05 DIAGNOSIS — S92.514A CLOSED NONDISPLACED FRACTURE OF PROXIMAL PHALANX OF LESSER TOE OF RIGHT FOOT, INITIAL ENCOUNTER: Primary | ICD-10-CM

## 2021-07-05 PROCEDURE — 99213 OFFICE O/P EST LOW 20 MIN: CPT

## 2021-07-05 PROCEDURE — 73630 X-RAY EXAM OF FOOT: CPT | Performed by: NURSE PRACTITIONER

## 2021-07-05 RX ORDER — IBUPROFEN 600 MG/1
600 TABLET ORAL ONCE
Status: COMPLETED | OUTPATIENT
Start: 2021-07-05 | End: 2021-07-05

## 2021-07-06 NOTE — ED INITIAL ASSESSMENT (HPI)
Patient with right foot pain and swelling since yesterday evening. States he is concerned that it may be gout. States symptoms started after eating shrimp yesterday morning.

## 2021-07-07 NOTE — ED PROVIDER NOTES
Patient Seen in: [facility]    History   Patient presents with:   Foot Pain    Stated Complaint: right foot pain    HPI    HPI: Alina Funez is a 45year old male who presents after an injury to the left foot that occurred while playing foot Breath Activated,  Inhale 1 puff into the lungs every 12 (twelve) hours. Fluticasone Propionate 50 MCG/ACT Nasal Suspension,  2 sprays by Each Nare route daily.    ALPRAZolam 1 MG Oral Tab,  Take 0.5 tablets (0.5 mg total) by mouth 2 (two) times daily as or paresthesias  EXTREMITIES: The left foot is tender over 2nd metatarsal and digit. There is no ligamentous instability . There is no notable deformity. Achilles is palpated and intact functionally.   Full range of motion of the knee on that side without placed in splint and counseled on splint care. Neurovascularly intact before and after splint application. Counseled on rice and over-the-counter analgesics. Recommend follow-up with PCP, referral given for orthopedist follow-up.   Return to ED precaution

## 2021-07-08 ENCOUNTER — OFFICE VISIT (OUTPATIENT)
Dept: FAMILY MEDICINE CLINIC | Facility: CLINIC | Age: 51
End: 2021-07-08
Payer: COMMERCIAL

## 2021-07-08 ENCOUNTER — TELEPHONE (OUTPATIENT)
Dept: FAMILY MEDICINE CLINIC | Facility: CLINIC | Age: 51
End: 2021-07-08

## 2021-07-08 VITALS
HEART RATE: 55 BPM | WEIGHT: 224 LBS | TEMPERATURE: 97 F | SYSTOLIC BLOOD PRESSURE: 138 MMHG | BODY MASS INDEX: 31.36 KG/M2 | HEIGHT: 71 IN | DIASTOLIC BLOOD PRESSURE: 81 MMHG

## 2021-07-08 DIAGNOSIS — S99.191A OTHER PHYSEAL FRACTURE OF RIGHT METATARSAL, INITIAL ENCOUNTER FOR CLOSED FRACTURE: Primary | ICD-10-CM

## 2021-07-08 PROCEDURE — 3075F SYST BP GE 130 - 139MM HG: CPT | Performed by: FAMILY MEDICINE

## 2021-07-08 PROCEDURE — 3008F BODY MASS INDEX DOCD: CPT | Performed by: FAMILY MEDICINE

## 2021-07-08 PROCEDURE — 3079F DIAST BP 80-89 MM HG: CPT | Performed by: FAMILY MEDICINE

## 2021-07-08 PROCEDURE — 99213 OFFICE O/P EST LOW 20 MIN: CPT | Performed by: FAMILY MEDICINE

## 2021-07-08 NOTE — TELEPHONE ENCOUNTER
Action Requested: Summary for Provider     []  Critical Lab, Recommendations Needed  [] Need Additional Advice  []   FYI    []   Need Orders  [] Need Medications Sent to Pharmacy  []  Other     SUMMARY: patient requesting f/u appt, fracture R second toe, o

## 2021-07-08 NOTE — PROGRESS NOTES
HPI/Subjective:   Patient ID: Yusuf Montes De Oca is a 45year old male. HPI  Patient presents with:  Urgent Care F/u: broke 2nd right toe three days ago was playing foot ball two weeks ago with son in the water toe   Two week history of injury.   Xray recent with this . He agrees. Pain controlled on ibuprofen. No orders of the defined types were placed in this encounter.       Meds This Visit:  Requested Prescriptions      No prescriptions requested or ordered in this encounter       Imaging & Referrals:  PO

## 2021-07-13 ENCOUNTER — OFFICE VISIT (OUTPATIENT)
Dept: PODIATRY CLINIC | Facility: CLINIC | Age: 51
End: 2021-07-13
Payer: COMMERCIAL

## 2021-07-13 DIAGNOSIS — S92.514A CLOSED NONDISPLACED FRACTURE OF PROXIMAL PHALANX OF LESSER TOE OF RIGHT FOOT, INITIAL ENCOUNTER: Primary | ICD-10-CM

## 2021-07-13 PROCEDURE — 99243 OFF/OP CNSLTJ NEW/EST LOW 30: CPT | Performed by: PODIATRIST

## 2021-07-13 NOTE — PROGRESS NOTES
HPI:    Patient ID: Dae Mcneil is a 45year old male. This pleasant 25-year-old male presents as a new patient to me on consult from [address] [place] [address]. Patient is here because of an injury to the second right toe. This occurred a few weeks ago.   He had gege asked he points to the base of the second toe is the area of pain. There is no significant pain on passive motion of the metatarsal phalangeal joint. There is no marks nor irritations.   Recent x-ray confirms a chip fracture of the base of the lateral asp

## 2021-08-09 RX ORDER — SILDENAFIL 50 MG/1
TABLET, FILM COATED ORAL
Qty: 10 TABLET | Refills: 10 | Status: SHIPPED | OUTPATIENT
Start: 2021-08-09

## 2021-10-12 RX ORDER — FENOFIBRATE 150 MG/1
1 CAPSULE ORAL DAILY
Qty: 90 CAPSULE | Refills: 3 | OUTPATIENT
Start: 2021-10-12

## 2021-10-15 RX ORDER — LISINOPRIL 20 MG/1
TABLET ORAL
Qty: 90 TABLET | Refills: 0 | Status: SHIPPED | OUTPATIENT
Start: 2021-10-15 | End: 2022-01-17

## 2021-10-15 RX ORDER — FENOFIBRATE 150 MG/1
1 CAPSULE ORAL DAILY
Qty: 90 CAPSULE | Refills: 0 | Status: SHIPPED | OUTPATIENT
Start: 2021-10-15 | End: 2022-06-03

## 2021-10-15 NOTE — TELEPHONE ENCOUNTER
90-day scripts sent for both meds as per Dr Linn Álvarez order below. CSS, please contact patient and assist in scheduling follow up office visit for chronic conditions. [employer] message sent informing pt.

## 2021-11-15 ENCOUNTER — TELEMEDICINE (OUTPATIENT)
Dept: FAMILY MEDICINE CLINIC | Facility: CLINIC | Age: 51
End: 2021-11-15
Payer: COMMERCIAL

## 2021-11-15 DIAGNOSIS — J01.00 ACUTE NON-RECURRENT MAXILLARY SINUSITIS: Primary | ICD-10-CM

## 2021-11-15 PROCEDURE — 99441 PHONE E/M BY PHYS 5-10 MIN: CPT | Performed by: PHYSICIAN ASSISTANT

## 2021-11-15 RX ORDER — AMOXICILLIN AND CLAVULANATE POTASSIUM 875; 125 MG/1; MG/1
1 TABLET, FILM COATED ORAL 2 TIMES DAILY
Qty: 20 TABLET | Refills: 0 | Status: SHIPPED | OUTPATIENT
Start: 2021-11-15 | End: 2022-01-13

## 2021-11-15 NOTE — PROGRESS NOTES
Please note that the following visit was completed using two-way, real-time interactive audio and/or video communication.   This has been done in good juliane to provide continuity of care in the best interest of the provider-patient relationship, due to the TAKE 1 TABLET(20 MG) BY MOUTH EVERY DAY 90 tablet 0   • SILDENAFIL CITRATE 50 MG Oral Tab TAKE 1 TABLET(50 MG) BY MOUTH DAILY AS NEEDED FOR ERECTILE DYSFUNCTION 10 tablet 10   • ibuprofen 800 MG Oral Tab Take 1 tablet (800 mg total) by mouth 3 (three) time Medical History:   Diagnosis Date   • Anxiety state    • Back problem     lower back fusion 2 years ago   • Colitis    • Diverticulosis of large intestine    • Esophageal reflux    • Essential hypertension    • Headache     per [employer]:  \"Headaches\"   • Referrals:  None               Blanca Seo PA-C  11/15/2021  12:16 PM    OY#[id number]

## 2022-01-12 ENCOUNTER — IMMUNIZATION (OUTPATIENT)
Dept: LAB | Facility: HOSPITAL | Age: 52
End: 2022-01-12
Attending: EMERGENCY MEDICINE
Payer: COMMERCIAL

## 2022-01-12 DIAGNOSIS — Z23 NEED FOR VACCINATION: Primary | ICD-10-CM

## 2022-01-12 PROCEDURE — 0004A SARSCOV2 VAC 30MCG/0.3ML IM: CPT

## 2022-01-12 PROCEDURE — 0054A SARSCOV2 VAC 30MCG/0.3ML IM: CPT

## 2022-01-13 ENCOUNTER — OFFICE VISIT (OUTPATIENT)
Dept: OTOLARYNGOLOGY | Facility: CLINIC | Age: 52
End: 2022-01-13
Payer: COMMERCIAL

## 2022-01-13 ENCOUNTER — OFFICE VISIT (OUTPATIENT)
Dept: AUDIOLOGY | Facility: CLINIC | Age: 52
End: 2022-01-13
Payer: COMMERCIAL

## 2022-01-13 VITALS — WEIGHT: 225 LBS | TEMPERATURE: 97 F | HEIGHT: 71 IN | BODY MASS INDEX: 31.5 KG/M2

## 2022-01-13 DIAGNOSIS — H90.3 SENSORINEURAL HEARING LOSS, BILATERAL: ICD-10-CM

## 2022-01-13 DIAGNOSIS — H90.3 SENSORY HEARING LOSS, BILATERAL: Primary | ICD-10-CM

## 2022-01-13 DIAGNOSIS — H69.83 DYSFUNCTION OF BOTH EUSTACHIAN TUBES: ICD-10-CM

## 2022-01-13 DIAGNOSIS — H90.3 SENSORINEURAL HEARING LOSS (SNHL) OF BOTH EARS: Primary | ICD-10-CM

## 2022-01-13 PROCEDURE — 3008F BODY MASS INDEX DOCD: CPT | Performed by: OTOLARYNGOLOGY

## 2022-01-13 PROCEDURE — 92557 COMPREHENSIVE HEARING TEST: CPT | Performed by: AUDIOLOGIST

## 2022-01-13 PROCEDURE — 99213 OFFICE O/P EST LOW 20 MIN: CPT | Performed by: OTOLARYNGOLOGY

## 2022-01-13 NOTE — PATIENT INSTRUCTIONS
How Hearing Aids Can Help You    Losing your hearing can be frustrating. But hearing aids can help you hear what Kenyetta Ros been missing. Not everyone who has hearing loss needs hearing aids.  Hearing aids will most likely help you if your hearing loss:   · The [facility]. All rights reserved. This information is not intended as a substitute for professional medical care. Always follow your healthcare professional's instructions.

## 2022-01-13 NOTE — PROGRESS NOTES
Gerhardt Lash is a 45year old male. Patient presents with:  Ear Wax: pt has c/o pain in both ears and dark hard blood in the right ear. has had pain for the past month. constant ear ringing that has gotten louder. within past month.  pt wants to get heari amount of stress his son had a valve replacement this summer he had a spinal fusion cervical about 9 months ago and currently his mother is in the hospital at [address] [place] [address] with a abdominal aortic aneurysm and she has been having a rough time of it.   He actually bleeds and also if he strains during a bowel movement he will get bleeding more left-sided than right-sided it seems to gush fairly significantly. He is not had any treatment for this and is not on any blood thinners currently.   He still has the tinnitus Quit date: 2017        Years since quittin.1      Smokeless tobacco: Never Used    Vaping Use      Vaping Use: Former    Substance and Sexual Activity      Alcohol use:  Yes        Alcohol/week: 1.0 standard drink        Types: 1 Cans of beer pe I and D, right (10/2011)\"     Past Surgical History:   Procedure Laterality Date   • BACK SURGERY     • COLONOSCOPY N/A 9/22/2017    Procedure: COLONOSCOPY;  Surgeon: Constance Cr MD;  Location: [facility] ENDOSCOPY   • ELECTROCARDIOGRAM, COMPLETE  11-03-20 canals are normal  pe tubes are patent               Current Outpatient Medications:   •  FENOFIBRATE 150 MG Oral Cap, TAKE 1 CAPSULE BY MOUTH DAILY, Disp: 90 capsule, Rfl: 0  •  LISINOPRIL 20 MG Oral Tab, TAKE 1 TABLET(20 MG) BY MOUTH EVERY DAY, Disp: 90

## 2022-01-14 NOTE — TELEPHONE ENCOUNTER
2nd attempt/nurse was first  Left voice mail message for pt to call back.  When the patient returns [facility] REMY Wolfe [facility]

## 2022-01-17 ENCOUNTER — OFFICE VISIT (OUTPATIENT)
Dept: FAMILY MEDICINE CLINIC | Facility: CLINIC | Age: 52
End: 2022-01-17
Payer: COMMERCIAL

## 2022-01-17 VITALS
WEIGHT: 226 LBS | BODY MASS INDEX: 31.64 KG/M2 | SYSTOLIC BLOOD PRESSURE: 148 MMHG | DIASTOLIC BLOOD PRESSURE: 69 MMHG | HEART RATE: 79 BPM | HEIGHT: 71 IN

## 2022-01-17 DIAGNOSIS — Z00.00 ROUTINE GENERAL MEDICAL EXAMINATION AT A HEALTH CARE FACILITY: Primary | ICD-10-CM

## 2022-01-17 DIAGNOSIS — Z72.0 TOBACCO USE: ICD-10-CM

## 2022-01-17 DIAGNOSIS — I10 ESSENTIAL HYPERTENSION: ICD-10-CM

## 2022-01-17 DIAGNOSIS — E78.00 HYPERCHOLESTEROLEMIA: ICD-10-CM

## 2022-01-17 DIAGNOSIS — M51.36 LUMBAR DISC NARROWING: ICD-10-CM

## 2022-01-17 PROBLEM — F41.9 ANXIETY: Status: ACTIVE | Noted: 2022-01-17

## 2022-01-17 PROCEDURE — 3077F SYST BP >= 140 MM HG: CPT | Performed by: FAMILY MEDICINE

## 2022-01-17 PROCEDURE — 3008F BODY MASS INDEX DOCD: CPT | Performed by: FAMILY MEDICINE

## 2022-01-17 PROCEDURE — 3078F DIAST BP <80 MM HG: CPT | Performed by: FAMILY MEDICINE

## 2022-01-17 PROCEDURE — 99396 PREV VISIT EST AGE 40-64: CPT | Performed by: FAMILY MEDICINE

## 2022-01-17 RX ORDER — BUPROPION HYDROCHLORIDE 150 MG/1
150 TABLET ORAL DAILY
Qty: 30 TABLET | Refills: 2 | Status: SHIPPED | OUTPATIENT
Start: 2022-01-17

## 2022-01-17 RX ORDER — ALPRAZOLAM 0.5 MG/1
0.5 TABLET ORAL 2 TIMES DAILY PRN
Qty: 30 TABLET | Refills: 1 | Status: SHIPPED | OUTPATIENT
Start: 2022-01-17

## 2022-01-17 RX ORDER — LISINOPRIL 20 MG/1
20 TABLET ORAL DAILY
Qty: 90 TABLET | Refills: 3 | Status: SHIPPED | OUTPATIENT
Start: 2022-01-17

## 2022-01-17 RX ORDER — OMEPRAZOLE 40 MG/1
40 CAPSULE, DELAYED RELEASE ORAL
Qty: 90 CAPSULE | Refills: 3 | Status: SHIPPED | OUTPATIENT
Start: 2022-01-17 | End: 2022-02-07

## 2022-01-17 NOTE — PROGRESS NOTES
Subjective:   Patient ID: Spenser Chris is a 45year old male. HPI  Patient presents with:  Routine Physical: annual physical   increase in anxiety level.      History/Other:    Patient Active Problem List:     left > right low back pain     L4-5 right tablet 10   • fluticasone-salmeterol (ADVAIR DISKUS) 250-50 MCG/DOSE Inhalation Aerosol Powder, Breath Activated Inhale 1 puff into the lungs every 12 (twelve) hours.  1 Package 1     Allergies:No Known Allergies    Objective:   Physical Exam  Vitals review Prescriptions     Signed Prescriptions Disp Refills   • ALPRAZolam 0.5 MG Oral Tab 30 tablet 1     Sig: Take 1 tablet (0.5 mg total) by mouth 2 (two) times daily as needed.    • lisinopril 20 MG Oral Tab 90 tablet 3     Sig: Take 1 tablet (20 mg total) by m

## 2022-01-19 ENCOUNTER — LAB ENCOUNTER (OUTPATIENT)
Dept: LAB | Age: 52
End: 2022-01-19
Attending: FAMILY MEDICINE
Payer: COMMERCIAL

## 2022-01-19 DIAGNOSIS — Z00.00 ROUTINE GENERAL MEDICAL EXAMINATION AT A HEALTH CARE FACILITY: ICD-10-CM

## 2022-01-19 LAB
ALBUMIN SERPL-MCNC: 4.3 G/DL (ref 3.4–5)
ALBUMIN/GLOB SERPL: 1.3 {RATIO} (ref 1–2)
ALP LIVER SERPL-CCNC: 50 U/L
ALT SERPL-CCNC: 32 U/L
ANION GAP SERPL CALC-SCNC: 8 MMOL/L (ref 0–18)
AST SERPL-CCNC: 15 U/L (ref 15–37)
BASOPHILS # BLD AUTO: 0.04 X10(3) UL (ref 0–0.2)
BASOPHILS NFR BLD AUTO: 0.6 %
BILIRUB SERPL-MCNC: 0.3 MG/DL (ref 0.1–2)
BILIRUB UR QL: NEGATIVE
BUN BLD-MCNC: 20 MG/DL (ref 7–18)
BUN/CREAT SERPL: 20.4 (ref 10–20)
CALCIUM BLD-MCNC: 9.8 MG/DL (ref 8.5–10.1)
CHLORIDE SERPL-SCNC: 108 MMOL/L (ref 98–112)
CHOLEST SERPL-MCNC: 209 MG/DL (ref ?–200)
CLARITY UR: CLEAR
CO2 SERPL-SCNC: 27 MMOL/L (ref 21–32)
COLOR UR: YELLOW
COMPLEXED PSA SERPL-MCNC: 0.31 NG/ML (ref ?–4)
CREAT BLD-MCNC: 0.98 MG/DL
DEPRECATED RDW RBC AUTO: 40.4 FL (ref 35.1–46.3)
EOSINOPHIL # BLD AUTO: 0.11 X10(3) UL (ref 0–0.7)
EOSINOPHIL NFR BLD AUTO: 1.7 %
ERYTHROCYTE [DISTWIDTH] IN BLOOD BY AUTOMATED COUNT: 12.6 % (ref 11–15)
FASTING PATIENT LIPID ANSWER: YES
FASTING STATUS PATIENT QL REPORTED: YES
GLOBULIN PLAS-MCNC: 3.2 G/DL (ref 2.8–4.4)
GLUCOSE BLD-MCNC: 109 MG/DL (ref 70–99)
GLUCOSE UR-MCNC: NEGATIVE MG/DL
HCT VFR BLD AUTO: 43.5 %
HDLC SERPL-MCNC: 39 MG/DL (ref 40–59)
HGB BLD-MCNC: 14.5 G/DL
HGB UR QL STRIP.AUTO: NEGATIVE
IMM GRANULOCYTES # BLD AUTO: 0.04 X10(3) UL (ref 0–1)
IMM GRANULOCYTES NFR BLD: 0.6 %
KETONES UR-MCNC: NEGATIVE MG/DL
LDLC SERPL CALC-MCNC: 141 MG/DL (ref ?–100)
LEUKOCYTE ESTERASE UR QL STRIP.AUTO: NEGATIVE
LYMPHOCYTES # BLD AUTO: 1.99 X10(3) UL (ref 1–4)
LYMPHOCYTES NFR BLD AUTO: 30.8 %
MCH RBC QN AUTO: 29.1 PG (ref 26–34)
MCHC RBC AUTO-ENTMCNC: 33.3 G/DL (ref 31–37)
MCV RBC AUTO: 87.3 FL
MONOCYTES # BLD AUTO: 0.49 X10(3) UL (ref 0.1–1)
MONOCYTES NFR BLD AUTO: 7.6 %
NEUTROPHILS # BLD AUTO: 3.79 X10 (3) UL (ref 1.5–7.7)
NEUTROPHILS # BLD AUTO: 3.79 X10(3) UL (ref 1.5–7.7)
NEUTROPHILS NFR BLD AUTO: 58.7 %
NITRITE UR QL STRIP.AUTO: NEGATIVE
NONHDLC SERPL-MCNC: 170 MG/DL (ref ?–130)
OSMOLALITY SERPL CALC.SUM OF ELEC: 299 MOSM/KG (ref 275–295)
PH UR: 6 [PH] (ref 5–8)
PLATELET # BLD AUTO: 212 10(3)UL (ref 150–450)
POTASSIUM SERPL-SCNC: 4.8 MMOL/L (ref 3.5–5.1)
PROT SERPL-MCNC: 7.5 G/DL (ref 6.4–8.2)
PROT UR-MCNC: NEGATIVE MG/DL
RBC # BLD AUTO: 4.98 X10(6)UL
SODIUM SERPL-SCNC: 143 MMOL/L (ref 136–145)
SP GR UR STRIP: 1.02 (ref 1–1.03)
TRIGL SERPL-MCNC: 158 MG/DL (ref 30–149)
UROBILINOGEN UR STRIP-ACNC: <2
VLDLC SERPL CALC-MCNC: 29 MG/DL (ref 0–30)
WBC # BLD AUTO: 6.5 X10(3) UL (ref 4–11)

## 2022-01-19 PROCEDURE — 36415 COLL VENOUS BLD VENIPUNCTURE: CPT

## 2022-01-19 PROCEDURE — 80061 LIPID PANEL: CPT

## 2022-01-19 PROCEDURE — 85025 COMPLETE CBC W/AUTO DIFF WBC: CPT

## 2022-01-19 PROCEDURE — 80053 COMPREHEN METABOLIC PANEL: CPT

## 2022-01-19 PROCEDURE — 81001 URINALYSIS AUTO W/SCOPE: CPT

## 2022-02-07 RX ORDER — OMEPRAZOLE 40 MG/1
40 CAPSULE, DELAYED RELEASE ORAL
Qty: 90 CAPSULE | Refills: 1 | Status: SHIPPED | OUTPATIENT
Start: 2022-02-07

## 2022-02-07 RX ORDER — OMEPRAZOLE 40 MG/1
CAPSULE, DELAYED RELEASE ORAL
Qty: 90 CAPSULE | Refills: 3 | OUTPATIENT
Start: 2022-02-07

## 2022-02-07 NOTE — TELEPHONE ENCOUNTER
Refill passed per [facility] protocol   Requested Prescriptions   Pending Prescriptions Disp Refills    OMEPRAZOLE 40 MG Oral Capsule Delayed Release [Pharmacy Med Name: OMEPRAZOLE 40MG CAPSULES] 90 capsule 3     Sig: TAKE 1 CAPSULE(40 MG) BY MOUTH DAILY BEFORE A MEAL        Gastrointestional Medication Protocol Passed - 2/6/2022 10:58 AM        Passed - Appointment in past 12 or next 3 months           OMEPRAZOLE 40 MG Oral Capsule Delayed Release [Pharmacy Med Name: OMEPRAZOLE 40MG CAPSULES] 90 capsule 3     Sig: TAKE 1 CAPSULE(40 MG) BY MOUTH DAILY BEFORE A MEAL        Gastrointestional Medication Protocol Passed - 2/6/2022 10:58 AM        Passed - Appointment in past 12 or next 3 months

## 2022-02-14 ENCOUNTER — OFFICE VISIT (OUTPATIENT)
Dept: FAMILY MEDICINE CLINIC | Facility: CLINIC | Age: 52
End: 2022-02-14
Payer: COMMERCIAL

## 2022-02-14 VITALS
DIASTOLIC BLOOD PRESSURE: 72 MMHG | BODY MASS INDEX: 31.64 KG/M2 | HEIGHT: 71 IN | WEIGHT: 226 LBS | HEART RATE: 72 BPM | SYSTOLIC BLOOD PRESSURE: 136 MMHG

## 2022-02-14 DIAGNOSIS — M25.551 RIGHT HIP PAIN: Primary | ICD-10-CM

## 2022-02-14 DIAGNOSIS — G57.01 PIRIFORMIS SYNDROME, RIGHT: ICD-10-CM

## 2022-02-14 PROCEDURE — 3078F DIAST BP <80 MM HG: CPT | Performed by: FAMILY MEDICINE

## 2022-02-14 PROCEDURE — 3008F BODY MASS INDEX DOCD: CPT | Performed by: FAMILY MEDICINE

## 2022-02-14 PROCEDURE — 3075F SYST BP GE 130 - 139MM HG: CPT | Performed by: FAMILY MEDICINE

## 2022-02-14 PROCEDURE — 99213 OFFICE O/P EST LOW 20 MIN: CPT | Performed by: FAMILY MEDICINE

## 2022-02-14 RX ORDER — CYCLOBENZAPRINE HCL 10 MG
10 TABLET ORAL 2 TIMES DAILY PRN
Qty: 20 TABLET | Refills: 1 | Status: SHIPPED | OUTPATIENT
Start: 2022-02-14 | End: 2022-03-23

## 2022-02-22 ENCOUNTER — PATIENT MESSAGE (OUTPATIENT)
Dept: FAMILY MEDICINE CLINIC | Facility: CLINIC | Age: 52
End: 2022-02-22

## 2022-02-22 ENCOUNTER — OFFICE VISIT (OUTPATIENT)
Dept: AUDIOLOGY | Facility: CLINIC | Age: 52
End: 2022-02-22
Payer: COMMERCIAL

## 2022-02-22 ENCOUNTER — TELEPHONE (OUTPATIENT)
Dept: FAMILY MEDICINE CLINIC | Facility: CLINIC | Age: 52
End: 2022-02-22

## 2022-02-22 DIAGNOSIS — H90.3 SENSORINEURAL HEARING LOSS, BILATERAL: Primary | ICD-10-CM

## 2022-02-22 PROCEDURE — 92591 HEARING AID EXAM, BOTH EARS: CPT | Performed by: AUDIOLOGIST

## 2022-02-22 RX ORDER — METHYLPREDNISOLONE 4 MG/1
TABLET ORAL
Qty: 1 EACH | Refills: 0 | Status: SHIPPED | OUTPATIENT
Start: 2022-02-22

## 2022-02-22 NOTE — PROGRESS NOTES
Hearing Aid Evaluation    Netta Shah  12/21/1970  [id number]    Netta Shah is a first time user.     The following were discussed with Stephendixon Reina:    Explanation of Audiogram  Patient's hearing loss  Communication difficulties  Importance of binaural instruments  Performance of noise  Benefits/limitations of style  Adjustment period and follow up visits    Hearing Aid Features  Automatic selection  Dual microphones  Noise suppression  Compatible with Bluetooth    Charges Associated with Hearing Aids  Hearing aid evaluation  Hearing aid(s)  Payment in full at delivery  Office visits after service agreement ends    Medical clearance was given by Dr. Catalina Sandoval    Patient's Choice  Level of technology: Premium  Fitting: binaural    Hearing Aid Ordered       Right    Left    OTICON OTICON   MORE 1 MINI RITE R MORE 1 MINI RITE R   : 85 : 85   LENGTH 2 LENGTH 2   COLOR: Zeb WILSON                 Patient has set an appointment for  on 3/22/2022.    2/22/2022  Rebecca Costello

## 2022-02-22 NOTE — TELEPHONE ENCOUNTER
Patient sent [employer] message below. Patient states pain is now shooting down his leg and traveling to his groin. Pain is 9/10 when he walks. Patient states he rested all week, went to work and then pain started again. Patient is taking muscle relaxers and doing the exercises Eva Canchola gave him. Please advise.

## 2022-02-22 NOTE — TELEPHONE ENCOUNTER
----- Message from Pino Araujo RN sent at 2/22/2022  3:11 PM CST -----  Regarding: FW: Right leg pain       ----- Message -----  From: Marilu Culver  Sent: 2/22/2022   1:26 PM CST  To: Suha Rn Triage  Subject: Right leg pain                                   Pain is not getting any better don't know if I should come see you again or if you can give me some kind of pain meds my leg is really really hurting bad

## 2022-02-22 NOTE — TELEPHONE ENCOUNTER
From: Jane Cook  To:  Wellington Peres DO  Sent: 2/22/2022 1:26 PM CST  Subject: Right leg pain     Pain is not getting any better don't know if I should come see you again or if you can give me some kind of pain meds my leg is really really hurting bad

## 2022-02-27 ENCOUNTER — HOSPITAL ENCOUNTER (EMERGENCY)
Facility: HOSPITAL | Age: 52
Discharge: HOME | End: 2022-02-27
Payer: COMMERCIAL

## 2022-02-27 ENCOUNTER — APPOINTMENT (OUTPATIENT)
Dept: GENERAL RADIOLOGY | Facility: HOSPITAL | Age: 52
End: 2022-02-27
Payer: COMMERCIAL

## 2022-02-27 VITALS
HEART RATE: 80 BPM | SYSTOLIC BLOOD PRESSURE: 165 MMHG | WEIGHT: 220 LBS | BODY MASS INDEX: 30.8 KG/M2 | RESPIRATION RATE: 20 BRPM | OXYGEN SATURATION: 97 % | HEIGHT: 71 IN | DIASTOLIC BLOOD PRESSURE: 81 MMHG | TEMPERATURE: 97 F

## 2022-02-27 DIAGNOSIS — M16.10 ARTHRITIS, HIP: Primary | ICD-10-CM

## 2022-02-27 PROCEDURE — 99283 EMERGENCY DEPT VISIT LOW MDM: CPT

## 2022-02-27 PROCEDURE — 73502 X-RAY EXAM HIP UNI 2-3 VIEWS: CPT

## 2022-02-27 RX ORDER — MELOXICAM 10 MG/1
10 CAPSULE ORAL DAILY
Qty: 21 CAPSULE | Refills: 1 | Status: SHIPPED | OUTPATIENT
Start: 2022-02-27 | End: 2022-03-29

## 2022-02-27 RX ORDER — ACETAMINOPHEN AND CODEINE PHOSPHATE 300; 30 MG/1; MG/1
1-2 TABLET ORAL EVERY 6 HOURS PRN
Qty: 15 TABLET | Refills: 0 | Status: SHIPPED | OUTPATIENT
Start: 2022-02-27 | End: 2022-03-23

## 2022-02-27 NOTE — ED INITIAL ASSESSMENT (HPI)
Patient presents with atraumatic R hip pain x 3 weeks. Reports his PCP prescribed steroids and muscle relaxer with no relief.

## 2022-02-27 NOTE — ED QUICK NOTES
Patient safe to DC home per MD. Discharge instructions reviewed with patient, including when and how to follow up. Patient verbalizes understanding. Patient ambulatory with walker to exit.

## 2022-03-01 ENCOUNTER — OFFICE VISIT (OUTPATIENT)
Dept: PHYSICAL MEDICINE AND REHAB | Facility: CLINIC | Age: 52
End: 2022-03-01
Payer: COMMERCIAL

## 2022-03-01 ENCOUNTER — HOSPITAL ENCOUNTER (OUTPATIENT)
Dept: GENERAL RADIOLOGY | Facility: HOSPITAL | Age: 52
Discharge: HOME | End: 2022-03-01
Attending: PHYSICAL MEDICINE & REHABILITATION
Payer: COMMERCIAL

## 2022-03-01 VITALS — BODY MASS INDEX: 30.8 KG/M2 | HEIGHT: 71 IN | WEIGHT: 220 LBS

## 2022-03-01 DIAGNOSIS — I21.4 NSTEMI (NON-ST ELEVATED MYOCARDIAL INFARCTION) (HCC): ICD-10-CM

## 2022-03-01 DIAGNOSIS — G47.00 INSOMNIA, UNSPECIFIED TYPE: ICD-10-CM

## 2022-03-01 DIAGNOSIS — M96.1 POSTLAMINECTOMY SYNDROME OF LUMBAR REGION: ICD-10-CM

## 2022-03-01 DIAGNOSIS — M96.1 POSTLAMINECTOMY SYNDROME OF LUMBAR REGION: Primary | ICD-10-CM

## 2022-03-01 DIAGNOSIS — K21.9 GASTROESOPHAGEAL REFLUX DISEASE, UNSPECIFIED WHETHER ESOPHAGITIS PRESENT: ICD-10-CM

## 2022-03-01 DIAGNOSIS — F41.9 ANXIETY: ICD-10-CM

## 2022-03-01 PROCEDURE — 72114 X-RAY EXAM L-S SPINE BENDING: CPT | Performed by: PHYSICAL MEDICINE & REHABILITATION

## 2022-03-01 PROCEDURE — 3008F BODY MASS INDEX DOCD: CPT | Performed by: PHYSICAL MEDICINE & REHABILITATION

## 2022-03-01 PROCEDURE — 99204 OFFICE O/P NEW MOD 45 MIN: CPT | Performed by: PHYSICAL MEDICINE & REHABILITATION

## 2022-03-02 PROBLEM — G47.00 INSOMNIA: Status: ACTIVE | Noted: 2022-03-02

## 2022-03-07 ENCOUNTER — TELEPHONE (OUTPATIENT)
Dept: NEUROLOGY | Facility: CLINIC | Age: 52
End: 2022-03-07

## 2022-03-07 NOTE — TELEPHONE ENCOUNTER
----- Message from Porsha Batista MD sent at 3/2/2022  5:34 PM CST -----  I personally reviewed a plain film x-ray of the lumbar spine showing an L4-S1 anterior fusion, no superjacent disc degeneration, there is mild dextroscoliosis apex L2, SI joints look okay. The patient know that his back x-ray looks okay. The fusion looks good, screws are all in place. I recommend continuing physical therapy and return in 4 weeks.

## 2022-03-08 ENCOUNTER — MED REC SCAN ONLY (OUTPATIENT)
Dept: PHYSICAL MEDICINE AND REHAB | Facility: CLINIC | Age: 52
End: 2022-03-08

## 2022-03-11 ENCOUNTER — PATIENT MESSAGE (OUTPATIENT)
Dept: PHYSICAL MEDICINE AND REHAB | Facility: CLINIC | Age: 52
End: 2022-03-11

## 2022-03-14 NOTE — TELEPHONE ENCOUNTER
From: Gabriela Alfaro  To: Issac Williamson MD  Sent: 3/11/2022 8:11 AM CST  Subject: Leg pain     Jessika Quinonez told me to make a appointment with you as my pain is not going away I will see Shelia Ellis Monday also instructed me ask for pain meds and if there is a way to get in before the March 23 that was the next available appointment in my chart

## 2022-03-14 NOTE — TELEPHONE ENCOUNTER
LOV 3/1/22. Plan continue tylenol # 3 and meloxicam for pain. NOV 3/23/22. ER visit 2/27/22. Tylenol #3 and meloxicam prescribed 2/27/22. Left voice mail message for patient to return call to office for further information on medication usage, pain relief.

## 2022-03-17 NOTE — TELEPHONE ENCOUNTER
Left voice mail message for patient will call with any change in plan from Dr Romy Rosa prior to [facility] already scheduled for 3/23/22.

## 2022-03-23 ENCOUNTER — OFFICE VISIT (OUTPATIENT)
Dept: PHYSICAL MEDICINE AND REHAB | Facility: CLINIC | Age: 52
End: 2022-03-23
Payer: COMMERCIAL

## 2022-03-23 ENCOUNTER — TELEPHONE (OUTPATIENT)
Dept: PHYSICAL MEDICINE AND REHAB | Facility: CLINIC | Age: 52
End: 2022-03-23

## 2022-03-23 ENCOUNTER — OFFICE VISIT (OUTPATIENT)
Dept: AUDIOLOGY | Facility: CLINIC | Age: 52
End: 2022-03-23
Payer: COMMERCIAL

## 2022-03-23 VITALS — BODY MASS INDEX: 30.8 KG/M2 | WEIGHT: 220 LBS | HEIGHT: 71 IN

## 2022-03-23 DIAGNOSIS — M96.1 POSTLAMINECTOMY SYNDROME OF LUMBAR REGION: Primary | ICD-10-CM

## 2022-03-23 DIAGNOSIS — H90.3 SENSORINEURAL HEARING LOSS, BILATERAL: Primary | ICD-10-CM

## 2022-03-23 DIAGNOSIS — F41.9 ANXIETY: ICD-10-CM

## 2022-03-23 DIAGNOSIS — I21.4 NSTEMI (NON-ST ELEVATED MYOCARDIAL INFARCTION) (HCC): ICD-10-CM

## 2022-03-23 DIAGNOSIS — K21.9 GASTROESOPHAGEAL REFLUX DISEASE, UNSPECIFIED WHETHER ESOPHAGITIS PRESENT: ICD-10-CM

## 2022-03-23 DIAGNOSIS — G47.00 INSOMNIA, UNSPECIFIED TYPE: ICD-10-CM

## 2022-03-23 PROCEDURE — V5261 HEARING AID, DIGIT, BIN, BTE: HCPCS | Performed by: AUDIOLOGIST

## 2022-03-23 PROCEDURE — 99214 OFFICE O/P EST MOD 30 MIN: CPT | Performed by: PHYSICAL MEDICINE & REHABILITATION

## 2022-03-23 PROCEDURE — 3008F BODY MASS INDEX DOCD: CPT | Performed by: PHYSICAL MEDICINE & REHABILITATION

## 2022-03-23 RX ORDER — CYCLOBENZAPRINE HCL 10 MG
10 TABLET ORAL NIGHTLY
Qty: 30 TABLET | Refills: 0 | Status: SHIPPED | OUTPATIENT
Start: 2022-03-23 | End: 2022-04-22

## 2022-03-23 RX ORDER — ACETAMINOPHEN AND CODEINE PHOSPHATE 300; 30 MG/1; MG/1
1-2 TABLET ORAL EVERY 6 HOURS PRN
Qty: 60 TABLET | Refills: 0 | Status: SHIPPED | OUTPATIENT
Start: 2022-03-23

## 2022-03-23 NOTE — TELEPHONE ENCOUNTER
Initiated authorization for L-Spine MRI CPT 06422 to be done at [address] [place] [address] with [address] online  Case #[id number].   Josere requested clinicals-clinicals faxed and uploaded to [phone]  Status: pending

## 2022-03-23 NOTE — PROGRESS NOTES
Hearing Aid [address] purchased two hearing aids. The hearing aid fitting was completed by Pallavi Acosta Hearing Aid Information       Right    Left   Make: Oticon Make: Oticon   Model: MORE 1 MINI RITE R Model: MORE 1 MINI RITE R   Serial Number: [id number] Serial Number: [id number]   Date of Purchase: 03/23/22 Date of Purchase: 03/23/22   Repair Warranty Exp: 03/23/25 Repair Warranty Exp: 03/23/25   L&D Warranty Exp: 03/23/25 L&D Warranty Exp: 03/23/25   Color:  (SILVER GREY) Color:  (SILVER GREY)   Battery:  (RECHARGEABLE) Battery:  [facility]     The following items were demonstrated to the patient:  Insertion/removal of hearing aid into ear  Adjustment of volume  Changing programs  Function and operation of controls  Telephone use  Cleaning of hearing aid  Storage of hearing aids  Use of accessories  Remote control operation    The patient was informed of or received the following:  Adjustment period and realistic expectations  Battery size  Battery ingestion warning  Cleaning tools  Hearing aid instruction book   Carrying case  Purchase agreement  Repair/loss coverage  Trial period/return policy  Service period  Medical clearance    Real ear testing done today  Paired to patient's [facility]    Patient expressed understanding to all discussed topics. Follow-up scheduled for 4 weeks.       03/23/22  Rebecca Acosta

## 2022-03-30 NOTE — TELEPHONE ENCOUNTER
Received Denial from [facility] for L-Spine MRI-  Denial reason:  Your records show that  you have had a repair (surgery) to treat a problem with your spine. The request cannot be  approved because:  Imaging requires six weeks of provider directed treatment to be completed. Supported treatments  include (but are not limited to) drugs for swelling or pain, an in office workout (physical  therapy), and/or oral or injected steroids. This must have been completed in the past three  months without improved symptoms. Contact (via office visit, phone, email, or messaging) must  occur after the treatment is completed. This has not been met because: You have not completed six weeks of provider directed treatment. The provider directed treatment did not occur within the last three months. Symptoms must be the same or worse after treatment to support imaging.   There was no contact with your provider after completing treatment    Patient notified via [employer]

## 2022-04-04 ENCOUNTER — HOSPITAL ENCOUNTER (OUTPATIENT)
Dept: GENERAL RADIOLOGY | Facility: HOSPITAL | Age: 52
Discharge: HOME | End: 2022-04-04
Attending: PHYSICAL MEDICINE & REHABILITATION
Payer: COMMERCIAL

## 2022-04-04 ENCOUNTER — HOSPITAL ENCOUNTER (OUTPATIENT)
Dept: MRI IMAGING | Facility: HOSPITAL | Age: 52
Discharge: HOME | End: 2022-04-04
Attending: PHYSICAL MEDICINE & REHABILITATION
Payer: COMMERCIAL

## 2022-04-04 DIAGNOSIS — M96.1 POSTLAMINECTOMY SYNDROME OF LUMBAR REGION: ICD-10-CM

## 2022-04-04 DIAGNOSIS — S05.40XA FOREIGN BODY BEHIND THE EYE: ICD-10-CM

## 2022-04-04 PROCEDURE — 70030 X-RAY EYE FOR FOREIGN BODY: CPT | Performed by: PHYSICAL MEDICINE & REHABILITATION

## 2022-04-06 NOTE — TELEPHONE ENCOUNTER
Dr. Gonzalez Early out of office today. Dr. Esme Adorno, patient requesting sedative for MRI tomorrow.      Please advise.         ----- Message -----  From: Jane Cook  Sent: 4/6/2022   9:52 AM CDT  To: Em Rn Triage  Subject: Request for sedative                             My test MRI is tomorrow at 10:30am in [facility]

## 2022-04-07 ENCOUNTER — HOSPITAL ENCOUNTER (OUTPATIENT)
Dept: MRI IMAGING | Age: 52
Discharge: HOME | End: 2022-04-07
Attending: PHYSICAL MEDICINE & REHABILITATION
Payer: COMMERCIAL

## 2022-04-07 PROCEDURE — 72148 MRI LUMBAR SPINE W/O DYE: CPT | Performed by: PHYSICAL MEDICINE & REHABILITATION

## 2022-04-08 ENCOUNTER — TELEPHONE (OUTPATIENT)
Dept: NEUROLOGY | Facility: CLINIC | Age: 52
End: 2022-04-08

## 2022-04-08 NOTE — TELEPHONE ENCOUNTER
----- Message from Sanju Mak MD sent at 4/8/2022  9:20 AM CDT -----  I personally reviewed a lumbar MRI from April 2022 showing an excellent interbody fusion between L4 and S1, no foraminal narrowing, no superjacent stenosis, facet arthropathy or disc breakdown. There is left extra foraminal disc osteophyte at L2-3, there is a small left L5-S1 disc osteophyte narrowing the lateral recess within the fusion. The visualized sacroiliac joints look normal.  There are no right-sided abnormalities. Please let the patient know that I reviewed his MRI. Everything looks good. There are no right-sided pinched nerves. I recommend a right sacroiliac injection for his leg pain. If he is okay with that, please pend me an order and I will sign it.

## 2022-04-08 NOTE — PROGRESS NOTES
I personally reviewed a lumbar MRI from April 2022 showing an excellent interbody fusion between L4 and S1, no foraminal narrowing, no superjacent stenosis, facet arthropathy or disc breakdown. There is left extra foraminal disc osteophyte at L2-3, there is a small left L5-S1 disc osteophyte narrowing the lateral recess within the fusion. The visualized sacroiliac joints look normal.  There are no right-sided abnormalities. Please let the patient know that I reviewed his MRI. Everything looks good. There are no right-sided pinched nerves. I recommend a right sacroiliac injection for his leg pain. If he is okay with that, please pend me an order and I will sign it.

## 2022-04-08 NOTE — TELEPHONE ENCOUNTER
Left voice mail message for patient to return call to office for MRI results and Dr Jordan Jenkins plan.

## 2022-04-11 ENCOUNTER — TELEPHONE (OUTPATIENT)
Dept: PHYSICAL MEDICINE AND REHAB | Facility: CLINIC | Age: 52
End: 2022-04-11

## 2022-04-11 NOTE — TELEPHONE ENCOUNTER
Initiated authorization for  Right sacroiliac injection CPT 89593 dx:M96.1 to be done at [facility] with Esme Kapoor at [facility]   Case #[id number].   [employer] requested clinicals-clinicals faxed/uploaded   Status: pending

## 2022-04-12 NOTE — TELEPHONE ENCOUNTER
Contacted [employer] and spoke to June Ríos, Reconsideration Nurse Reviewer and informed her of the 3 positive SI tests as required per [employer] Guidelines.    Status: Approved with authorization #  #[id number] valid 4/12/22-10/9/22

## 2022-04-12 NOTE — TELEPHONE ENCOUNTER
Patient has been scheduled for Right SIJ on [facility] at the 5/5/22 with .   -Anesthesia type: Local.  -If receiving MAC or IVC sedation patient will need to get COVID tested 3 days prior even if already vaccinated (order placed by [facility].)  -If scheduling [facility] covid testing required for all procedures whether patient is vaccinated or not. -Patient informed not to eat or drink anything after midnight the night prior to the procedure, if being sedated. -Patient was advised that if he/she does receive the covid vaccine it needs to be at least 2 weeks before or after the injection. -Medications and allergies reviewed. -Patient reminded to hold NSAIDs (Ibuprofen, ASA 81, Aleve, Naproxen, Mobic etc.) for 3 days prior to [place]  if BMI is greater than 35. For Cervical injections only hold multivitamins, Vitamin E, Fish Oil, Phentermine (Lomaira) for 7 days prior to injection and NSAIDS.  mg to be held for 7 days prior to injections.  -If patient is receiving MAC/IVCS Phentermine Radha Sieve) will need to be held for 7 days prior to injection.  -If on blood thinner clearance has been received to hold this medication by provider.   -Patient informed he/she will need a  to and from procedure. -[facility] is located in the [facility] 1st floor. Patient may park in the yellow parking. Patient verbalized understanding and agrees with plan.  -----> Scheduled in Epic: Yes  -----> Scheduled in [employer]:  Yes

## 2022-04-20 ENCOUNTER — PATIENT MESSAGE (OUTPATIENT)
Dept: PHYSICAL MEDICINE AND REHAB | Facility: CLINIC | Age: 52
End: 2022-04-20

## 2022-04-20 NOTE — TELEPHONE ENCOUNTER
Right now its the best I have. He will feel better with the SI injection. No NSAIDs because of his heart condition.

## 2022-04-20 NOTE — TELEPHONE ENCOUNTER
Patient requesting new rx. Tylenol 3 & Cyclobenzaprine have no relieved pain. No NSAIDs, return post MRI.     NOV: 5/5/22 for Right SIJ

## 2022-04-20 NOTE — TELEPHONE ENCOUNTER
From: Darío Hudson  To: Melissa Fatima MD  Sent: 4/20/2022 9:03 AM CDT  Subject: Insurance     I would like to know what is our next step is as my pain is not getting better and the pain pills you have given are not doing anything to relieve my pain and what are the shots you are supposed to give me on the may 5th si I know what to expect just need a little more information on this procedure

## 2022-04-25 ENCOUNTER — OFFICE VISIT (OUTPATIENT)
Dept: AUDIOLOGY | Facility: CLINIC | Age: 52
End: 2022-04-25
Payer: COMMERCIAL

## 2022-04-25 DIAGNOSIS — H90.3 SENSORINEURAL HEARING LOSS, BILATERAL: Primary | ICD-10-CM

## 2022-04-25 PROCEDURE — 92593 HEARING AID CHECK, BOTH EARS: CPT | Performed by: AUDIOLOGIST

## 2022-04-25 RX ORDER — FLUOXETINE HYDROCHLORIDE 20 MG/1
CAPSULE ORAL
Qty: 30 CAPSULE | Refills: 11 | Status: SHIPPED | OUTPATIENT
Start: 2022-04-25 | End: 2022-04-25 | Stop reason: CLARIF

## 2022-04-25 NOTE — TELEPHONE ENCOUNTER
Informed patient that medication was discontinued  By Dr Davion Huizar on 1/17/22. advised pt to check with PCP and to call for any concerns pt verbalized understanding.

## 2022-04-25 NOTE — PROGRESS NOTES
HEARING AID FOLLOW-UP    [facility]  12/21/1970  [id number]    Otoscopic Inspection:  both ears: no cerumen and TM visualized      Hearing Aid Information       Right    Left   Make: Oticon Make: Oticon   Model: MORE 1 MINI RITE R Model: MORE 1 MINI RITE R   Serial Number: [id number] Serial Number: [id number]   Date of Purchase: 03/23/22 Date of Purchase: 03/23/22   Repair Warranty Exp: 03/23/25 Repair Warranty Exp: 03/23/25   L&D Warranty Exp: 03/23/25 L&D Warranty Exp: 03/23/25   Color:  (SILVER GREY) Color:  (SILVER GREY)   Battery:  (RECHARGEABLE) Battery:  [facility])         Patient is here for first follow-up of new hearing aid fitting    The patient has the following concerns: he doesn't seem to be hearing well out of left ear. Thinks it might be that his PE tube is clogged. Hearing aid check- fine     In office the following actions were taken:  Practiced changing wax guard and domes- patient handled well  Gave extra domes and wax guards as courtesy    Datalogging shows 3.5 hours/ daily usage.   Discussed with patient    Patient likes hearing aids and notes satisfaction when wearing    He has appt with Jessica Burciaga on 4/29 to address clogged feeling in left ear     Patient is to follow up in one year or as needed    4/25/2022  Rebecca Vargas

## 2022-04-27 RX ORDER — FLUOXETINE HYDROCHLORIDE 20 MG/1
CAPSULE ORAL
Qty: 30 CAPSULE | Refills: 11 | Status: SHIPPED | OUTPATIENT
Start: 2022-04-27

## 2022-04-28 ENCOUNTER — OFFICE VISIT (OUTPATIENT)
Dept: SURGERY | Facility: CLINIC | Age: 52
End: 2022-04-28

## 2022-04-28 DIAGNOSIS — M53.3 SACROILIAC JOINT DYSFUNCTION: Primary | ICD-10-CM

## 2022-04-28 PROCEDURE — 27096 INJECT SACROILIAC JOINT: CPT | Performed by: PHYSICAL MEDICINE & REHABILITATION

## 2022-04-28 NOTE — PROCEDURES
Preoperative Diagnosis:  (M53.3) Sacroiliac joint dysfunction  (primary encounter diagnosis)       Postoperative Diagnosis:  (M53.3) Sacroiliac joint dysfunction  (primary encounter diagnosis)       Procedures:  Right sacroiliac injection(s) under fluoroscopic guidance and contrast enhancement. Surgeon:  Riddhi Tabares M.D. Anesthesia:  Local      OPERATIVE PROCEDURE:  The patient was consented. He was brought into the fluoroscopy suite and placed on the table in the prone position. He was sterilely prepped and draped in routine fashion. The sacroiliac joint(s) were identified. The overlying skin was anesthetized. 22-gauge spinal needles were introduced to the inferior aspect of the sacroiliac joint. Needle position was verified using fluoroscopy and radiographic contrast showing a sacroiliac arthrogram.  Radiographic interpretation was that the needle was in proper position for sacroiliac injection(s). I placed a mixture of 1.5mL of 6mg/mL Celestone and 1.5mL of 1% preservative-free lidocaine into each joint. The patient tolerated the procedure well without any immediate complications. He was given discharge instructions and is to follow up with me in approximately two weeks.

## 2022-04-29 ENCOUNTER — OFFICE VISIT (OUTPATIENT)
Dept: OTOLARYNGOLOGY | Facility: CLINIC | Age: 52
End: 2022-04-29
Payer: COMMERCIAL

## 2022-04-29 ENCOUNTER — TELEPHONE (OUTPATIENT)
Dept: PHYSICAL MEDICINE AND REHAB | Facility: CLINIC | Age: 52
End: 2022-04-29

## 2022-04-29 VITALS — BODY MASS INDEX: 31.5 KG/M2 | WEIGHT: 225 LBS | HEIGHT: 71 IN

## 2022-04-29 DIAGNOSIS — H69.83 DYSFUNCTION OF BOTH EUSTACHIAN TUBES: Primary | ICD-10-CM

## 2022-04-29 PROCEDURE — 3008F BODY MASS INDEX DOCD: CPT | Performed by: OTOLARYNGOLOGY

## 2022-04-29 PROCEDURE — 99213 OFFICE O/P EST LOW 20 MIN: CPT | Performed by: OTOLARYNGOLOGY

## 2022-04-30 RX ORDER — FLUOXETINE HYDROCHLORIDE 20 MG/1
CAPSULE ORAL
Qty: 30 CAPSULE | Refills: 11 | OUTPATIENT
Start: 2022-04-30

## 2022-05-18 ENCOUNTER — OFFICE VISIT (OUTPATIENT)
Dept: PHYSICAL MEDICINE AND REHAB | Facility: CLINIC | Age: 52
End: 2022-05-18
Payer: COMMERCIAL

## 2022-05-18 VITALS
HEIGHT: 71 IN | BODY MASS INDEX: 31.5 KG/M2 | WEIGHT: 225 LBS | SYSTOLIC BLOOD PRESSURE: 134 MMHG | OXYGEN SATURATION: 97 % | HEART RATE: 71 BPM | DIASTOLIC BLOOD PRESSURE: 84 MMHG

## 2022-05-18 DIAGNOSIS — F41.9 ANXIETY: ICD-10-CM

## 2022-05-18 DIAGNOSIS — I21.4 NSTEMI (NON-ST ELEVATED MYOCARDIAL INFARCTION) (HCC): ICD-10-CM

## 2022-05-18 DIAGNOSIS — K21.9 GASTROESOPHAGEAL REFLUX DISEASE, UNSPECIFIED WHETHER ESOPHAGITIS PRESENT: ICD-10-CM

## 2022-05-18 DIAGNOSIS — G47.00 INSOMNIA, UNSPECIFIED TYPE: ICD-10-CM

## 2022-05-18 DIAGNOSIS — M96.1 POSTLAMINECTOMY SYNDROME OF LUMBAR REGION: Primary | ICD-10-CM

## 2022-05-18 PROCEDURE — 99213 OFFICE O/P EST LOW 20 MIN: CPT | Performed by: PHYSICAL MEDICINE & REHABILITATION

## 2022-05-18 PROCEDURE — 3075F SYST BP GE 130 - 139MM HG: CPT | Performed by: PHYSICAL MEDICINE & REHABILITATION

## 2022-05-18 PROCEDURE — 3079F DIAST BP 80-89 MM HG: CPT | Performed by: PHYSICAL MEDICINE & REHABILITATION

## 2022-05-18 PROCEDURE — 3008F BODY MASS INDEX DOCD: CPT | Performed by: PHYSICAL MEDICINE & REHABILITATION

## 2022-06-03 NOTE — TELEPHONE ENCOUNTER
----- Message from Brent Alves DO sent at 11/1/2021  4:44 PM CDT -----  Her labs came back good, no signs of iron def or thyroid dysfxn.  I wonder if her hair loss is related to her most recent stress.     Please review. Protocol failed / No protocol.     Requested Prescriptions   Pending Prescriptions Disp Refills    FENOFIBRATE 150 MG Oral Cap [Pharmacy Med Name: FENOFIBRATE 150MG CAPSULES] 90 capsule 0     Sig: TAKE 1 CAPSULE BY MOUTH DAILY        Cholesterol Medication Protocol Failed - 6/3/2022  1:58 PM        Failed - Last LDL < 130     Lab Results   Component Value Date     (H) 01/19/2022               Passed - ALT in past 12 months        Passed - LDL in past 12 months        Passed - Last ALT < 80       Lab Results   Component Value Date    ALT 32 01/19/2022             Passed - Appointment in past 12 or next 3 months                  Recent Outpatient Visits              2 weeks ago Postlaminectomy syndrome of lumbar region    [address] [facility], [facility] Jarrell Schaumann, MD    Office Visit    1 month ago Dysfunction of both eustachian tubes    [facility], [address], Gibson Merino MD    Office Visit    1 month ago Sacroiliac joint dysfunction    [facility] Jarrell Schaumann, MD    Office Visit    1 month ago Sensorineural hearing loss, bilateral    [facility] Audiology Rebecca Patterson    Office Visit    2 months ago Postlaminectomy syndrome of lumbar region    [address] [facility], [facility] Jarrell Schaumann, MD    Office Visit

## 2022-06-06 RX ORDER — FENOFIBRATE 150 MG/1
1 CAPSULE ORAL DAILY
Qty: 90 CAPSULE | Refills: 2 | Status: SHIPPED | OUTPATIENT
Start: 2022-06-06 | End: 2023-03-09

## 2022-07-08 ENCOUNTER — OFFICE VISIT (OUTPATIENT)
Dept: FAMILY MEDICINE CLINIC | Facility: CLINIC | Age: 52
End: 2022-07-08
Payer: COMMERCIAL

## 2022-07-08 VITALS
WEIGHT: 224 LBS | DIASTOLIC BLOOD PRESSURE: 82 MMHG | BODY MASS INDEX: 31.36 KG/M2 | SYSTOLIC BLOOD PRESSURE: 135 MMHG | HEART RATE: 60 BPM | HEIGHT: 71 IN

## 2022-07-08 DIAGNOSIS — H65.04 RECURRENT ACUTE SEROUS OTITIS MEDIA OF RIGHT EAR: Primary | ICD-10-CM

## 2022-07-08 DIAGNOSIS — M77.11 RIGHT LATERAL EPICONDYLITIS: ICD-10-CM

## 2022-07-08 PROCEDURE — 99214 OFFICE O/P EST MOD 30 MIN: CPT | Performed by: FAMILY MEDICINE

## 2022-07-08 PROCEDURE — 3008F BODY MASS INDEX DOCD: CPT | Performed by: FAMILY MEDICINE

## 2022-07-08 PROCEDURE — 3079F DIAST BP 80-89 MM HG: CPT | Performed by: FAMILY MEDICINE

## 2022-07-08 PROCEDURE — 3075F SYST BP GE 130 - 139MM HG: CPT | Performed by: FAMILY MEDICINE

## 2022-07-08 RX ORDER — CIPROFLOXACIN AND DEXAMETHASONE 3; 1 MG/ML; MG/ML
4 SUSPENSION/ DROPS AURICULAR (OTIC) 2 TIMES DAILY
Qty: 1 EACH | Refills: 0 | Status: SHIPPED | OUTPATIENT
Start: 2022-07-08 | End: 2022-07-13

## 2022-07-22 ENCOUNTER — TELEPHONE (OUTPATIENT)
Dept: GASTROENTEROLOGY | Facility: CLINIC | Age: 52
End: 2022-07-22

## 2022-07-22 NOTE — TELEPHONE ENCOUNTER
----- Message from Shirin Cameron, [address] sent at 7/22/2022  7:41 AM CDT -----  Regarding: Recall Colon  Colt Bains CMA  P [facility] Surg/Proc Scheduling  Recall colon in 5 years per PL.  Colon done 9-22-17

## 2022-08-02 ENCOUNTER — HOSPITAL ENCOUNTER (OUTPATIENT)
Age: 52
Discharge: HOME | End: 2022-08-02
Payer: COMMERCIAL

## 2022-08-02 VITALS
WEIGHT: 220 LBS | HEART RATE: 69 BPM | TEMPERATURE: 97 F | HEIGHT: 71 IN | RESPIRATION RATE: 18 BRPM | BODY MASS INDEX: 30.8 KG/M2 | SYSTOLIC BLOOD PRESSURE: 150 MMHG | OXYGEN SATURATION: 95 % | DIASTOLIC BLOOD PRESSURE: 91 MMHG

## 2022-08-02 DIAGNOSIS — R21 RASH AND NONSPECIFIC SKIN ERUPTION: Primary | ICD-10-CM

## 2022-08-02 LAB — SARS-COV-2 RNA RESP QL NAA+PROBE: NOT DETECTED

## 2022-08-02 PROCEDURE — 99213 OFFICE O/P EST LOW 20 MIN: CPT

## 2022-08-02 RX ORDER — PREDNISONE 20 MG/1
40 TABLET ORAL DAILY
Qty: 10 TABLET | Refills: 0 | Status: SHIPPED | OUTPATIENT
Start: 2022-08-02 | End: 2022-08-02

## 2022-08-02 RX ORDER — PREDNISONE 20 MG/1
40 TABLET ORAL DAILY
Qty: 10 TABLET | Refills: 0 | Status: SHIPPED | OUTPATIENT
Start: 2022-08-02 | End: 2022-08-07

## 2022-08-02 NOTE — ED INITIAL ASSESSMENT (HPI)
Pt presents with raised red open sores to right leg. Pt has had rash x 4 weeks. Pt has been seen by PMD, instructed to use Cortizone cream.     Pt noticed rash has now extended into right groin. Pt was working in the yard and believe the raised area are bug bites. PCT offered Covid Test to pt, pt agreed to testing.

## 2022-09-08 ENCOUNTER — TELEPHONE (OUTPATIENT)
Dept: ORTHOPEDICS CLINIC | Facility: CLINIC | Age: 52
End: 2022-09-08

## 2022-09-08 ENCOUNTER — APPOINTMENT (OUTPATIENT)
Dept: GENERAL RADIOLOGY | Facility: HOSPITAL | Age: 52
End: 2022-09-08
Attending: EMERGENCY MEDICINE
Payer: COMMERCIAL

## 2022-09-08 ENCOUNTER — HOSPITAL ENCOUNTER (EMERGENCY)
Facility: HOSPITAL | Age: 52
Discharge: HOME | End: 2022-09-08
Attending: EMERGENCY MEDICINE
Payer: COMMERCIAL

## 2022-09-08 VITALS
DIASTOLIC BLOOD PRESSURE: 89 MMHG | HEIGHT: 71 IN | BODY MASS INDEX: 30.8 KG/M2 | WEIGHT: 220 LBS | HEART RATE: 80 BPM | TEMPERATURE: 98 F | RESPIRATION RATE: 18 BRPM | SYSTOLIC BLOOD PRESSURE: 150 MMHG | OXYGEN SATURATION: 94 %

## 2022-09-08 DIAGNOSIS — M70.42 PREPATELLAR BURSITIS OF LEFT KNEE: Primary | ICD-10-CM

## 2022-09-08 DIAGNOSIS — M25.562 LEFT KNEE PAIN, UNSPECIFIED CHRONICITY: Primary | ICD-10-CM

## 2022-09-08 PROCEDURE — 73560 X-RAY EXAM OF KNEE 1 OR 2: CPT | Performed by: EMERGENCY MEDICINE

## 2022-09-08 PROCEDURE — 99283 EMERGENCY DEPT VISIT LOW MDM: CPT

## 2022-09-08 RX ORDER — NAPROXEN 500 MG/1
500 TABLET ORAL 2 TIMES DAILY PRN
Qty: 14 TABLET | Refills: 0 | Status: SHIPPED | OUTPATIENT
Start: 2022-09-08 | End: 2022-09-15

## 2022-09-08 RX ORDER — HYDROCODONE BITARTRATE AND ACETAMINOPHEN 5; 325 MG/1; MG/1
1 TABLET ORAL EVERY 6 HOURS PRN
Qty: 10 TABLET | Refills: 0 | Status: SHIPPED | OUTPATIENT
Start: 2022-09-08

## 2022-09-08 RX ORDER — CEPHALEXIN 500 MG/1
500 CAPSULE ORAL 4 TIMES DAILY
Qty: 40 CAPSULE | Refills: 0 | Status: SHIPPED | OUTPATIENT
Start: 2022-09-08 | End: 2022-09-18

## 2022-09-08 NOTE — ED INITIAL ASSESSMENT (HPI)
Presents for left knee pain since Monday, denies any injury to knee. Reports he took ibuprofen with little relief.

## 2022-09-08 NOTE — TELEPHONE ENCOUNTER
ED case manager requesting an appt within the next two days for bursitis. Let them know you were the only DrLevon In this week and very booked. Provided them with phone number for [facility] in [place]. Please advise if you would like him added on today or tomorrow.

## 2022-09-08 NOTE — TELEPHONE ENCOUNTER
Future Appointments   Date Time Provider Katie Gonzalez   9/13/2022 10:00 AM Deon Doe PA-C [facility]     This patient is coming for LT Knee Bursitis. There was recent imaging done in epic. Please advise if additional views are needed for this appt. Thanks.     Patient can be reached at [phone]

## 2022-09-08 NOTE — CM/SW NOTE
Called Dr Gretchen Aguilar office 2 more times. Tanna Saucedo from Dr Gretchen Aguilar office is sending him a message to see if he can see the pt today or tomorrow. Tanna Saucedo provided the [facility] ortho group #[id number] to see if an ortho MD in their group can see the pt.

## 2022-09-08 NOTE — CM/SW NOTE
appt made with Dr Anusha Mak on Tuesday September 13th at [address], [address] [place] [address] [facility]    Notified the pt of this appt and the possibility of a sooner with Dr Kent Anawalt if they call him back. Voices understanding.

## 2022-09-08 NOTE — CM/SW NOTE
Attempted to call Dr OK BATRES [facility] office 2 times, no one answers. Dr Frank Black is the only MD in this week.

## 2022-09-09 ENCOUNTER — HOSPITAL ENCOUNTER (OUTPATIENT)
Dept: GENERAL RADIOLOGY | Facility: HOSPITAL | Age: 52
Discharge: HOME | End: 2022-09-09
Attending: ORTHOPAEDIC SURGERY
Payer: COMMERCIAL

## 2022-09-09 ENCOUNTER — OFFICE VISIT (OUTPATIENT)
Dept: ORTHOPEDICS CLINIC | Facility: CLINIC | Age: 52
End: 2022-09-09
Payer: COMMERCIAL

## 2022-09-09 DIAGNOSIS — M25.462 KNEE EFFUSION, LEFT: Primary | ICD-10-CM

## 2022-09-09 DIAGNOSIS — M25.562 LEFT KNEE PAIN, UNSPECIFIED CHRONICITY: ICD-10-CM

## 2022-09-09 PROCEDURE — 99204 OFFICE O/P NEW MOD 45 MIN: CPT | Performed by: ORTHOPAEDIC SURGERY

## 2022-09-09 PROCEDURE — 73564 X-RAY EXAM KNEE 4 OR MORE: CPT | Performed by: ORTHOPAEDIC SURGERY

## 2022-09-23 NOTE — PROGRESS NOTES
HPI:    Patient ID: Anupama Carrillo is a 49year old male. HPI  Patient presents with:  Ear Pain: c/o left ear pain    Review of Systems   Constitutional: Negative. HENT: Positive for ear pain and hearing loss. Respiratory: Positive for cough. Erectile Dysfunction.            Imaging & Referrals:  None       #[phone]

## 2022-11-07 ENCOUNTER — HOSPITAL ENCOUNTER (OUTPATIENT)
Age: 52
Discharge: HOME | End: 2022-11-07
Payer: COMMERCIAL

## 2022-11-07 VITALS
TEMPERATURE: 98 F | RESPIRATION RATE: 18 BRPM | DIASTOLIC BLOOD PRESSURE: 72 MMHG | SYSTOLIC BLOOD PRESSURE: 136 MMHG | HEART RATE: 81 BPM | OXYGEN SATURATION: 98 %

## 2022-11-07 DIAGNOSIS — J02.9 VIRAL PHARYNGITIS: Primary | ICD-10-CM

## 2022-11-07 DIAGNOSIS — H92.01 RIGHT EAR PAIN: ICD-10-CM

## 2022-11-07 LAB — S PYO AG THROAT QL: NEGATIVE

## 2022-11-07 PROCEDURE — 87880 STREP A ASSAY W/OPTIC: CPT

## 2022-11-07 PROCEDURE — 99213 OFFICE O/P EST LOW 20 MIN: CPT

## 2022-11-07 PROCEDURE — 99212 OFFICE O/P EST SF 10 MIN: CPT

## 2022-11-07 NOTE — DISCHARGE INSTRUCTIONS
Take over-the-counter ibuprofen or Tylenol for pain or discomfort. Gargle with salt water 2-3 times per day and spit it out. You may try over-the-counter Chloraseptic spray for throat discomfort. Drink plenty of fluids warm tea with honey. If you feel any postnasal drip your sinuses dripping on the back of your throat recommend over-the-counter Flonase and Zyrtec. Follow-up with primary care provider if symptoms persist or worsen.

## 2023-03-09 ENCOUNTER — LAB ENCOUNTER (OUTPATIENT)
Dept: LAB | Age: 53
End: 2023-03-09
Attending: FAMILY MEDICINE
Payer: COMMERCIAL

## 2023-03-09 ENCOUNTER — OFFICE VISIT (OUTPATIENT)
Dept: FAMILY MEDICINE CLINIC | Facility: CLINIC | Age: 53
End: 2023-03-09
Payer: COMMERCIAL

## 2023-03-09 VITALS
BODY MASS INDEX: 31.22 KG/M2 | HEART RATE: 63 BPM | SYSTOLIC BLOOD PRESSURE: 128 MMHG | OXYGEN SATURATION: 95 % | WEIGHT: 223 LBS | HEIGHT: 71 IN | DIASTOLIC BLOOD PRESSURE: 84 MMHG

## 2023-03-09 DIAGNOSIS — I10 ESSENTIAL HYPERTENSION: ICD-10-CM

## 2023-03-09 DIAGNOSIS — Z00.00 ROUTINE GENERAL MEDICAL EXAMINATION AT A HEALTH CARE FACILITY: ICD-10-CM

## 2023-03-09 DIAGNOSIS — Z72.0 TOBACCO USE: ICD-10-CM

## 2023-03-09 DIAGNOSIS — Z12.11 COLON CANCER SCREENING: ICD-10-CM

## 2023-03-09 DIAGNOSIS — F41.9 ANXIETY: ICD-10-CM

## 2023-03-09 DIAGNOSIS — Z00.00 ROUTINE GENERAL MEDICAL EXAMINATION AT A HEALTH CARE FACILITY: Primary | ICD-10-CM

## 2023-03-09 DIAGNOSIS — E78.00 HYPERCHOLESTEROLEMIA: ICD-10-CM

## 2023-03-09 DIAGNOSIS — M54.12 CERVICAL RADICULOPATHY: ICD-10-CM

## 2023-03-09 PROBLEM — J44.9 CHRONIC OBSTRUCTIVE PULMONARY DISEASE, UNSPECIFIED (HCC): Status: ACTIVE | Noted: 2023-03-09

## 2023-03-09 LAB
ALBUMIN SERPL-MCNC: 4.2 G/DL (ref 3.4–5)
ALBUMIN/GLOB SERPL: 1.3 {RATIO} (ref 1–2)
ALP LIVER SERPL-CCNC: 45 U/L
ALT SERPL-CCNC: 21 U/L
ANION GAP SERPL CALC-SCNC: 5 MMOL/L (ref 0–18)
AST SERPL-CCNC: 9 U/L (ref 15–37)
BASOPHILS # BLD AUTO: 0.02 X10(3) UL (ref 0–0.2)
BASOPHILS NFR BLD AUTO: 0.3 %
BILIRUB SERPL-MCNC: 0.4 MG/DL (ref 0.1–2)
BILIRUB UR QL: NEGATIVE
BUN BLD-MCNC: 17 MG/DL (ref 7–18)
BUN/CREAT SERPL: 17 (ref 10–20)
CALCIUM BLD-MCNC: 9.7 MG/DL (ref 8.5–10.1)
CHLORIDE SERPL-SCNC: 108 MMOL/L (ref 98–112)
CHOLEST SERPL-MCNC: 174 MG/DL (ref ?–200)
CLARITY UR: CLEAR
CO2 SERPL-SCNC: 27 MMOL/L (ref 21–32)
COLOR UR: YELLOW
COMPLEXED PSA SERPL-MCNC: 0.43 NG/ML (ref ?–4)
CREAT BLD-MCNC: 1 MG/DL
DEPRECATED RDW RBC AUTO: 39.8 FL (ref 35.1–46.3)
EOSINOPHIL # BLD AUTO: 0.11 X10(3) UL (ref 0–0.7)
EOSINOPHIL NFR BLD AUTO: 1.7 %
ERYTHROCYTE [DISTWIDTH] IN BLOOD BY AUTOMATED COUNT: 12.7 % (ref 11–15)
FASTING PATIENT LIPID ANSWER: YES
FASTING STATUS PATIENT QL REPORTED: YES
GFR SERPLBLD BASED ON 1.73 SQ M-ARVRAT: 90 ML/MIN/1.73M2 (ref 60–?)
GLOBULIN PLAS-MCNC: 3.2 G/DL (ref 2.8–4.4)
GLUCOSE BLD-MCNC: 104 MG/DL (ref 70–99)
GLUCOSE UR-MCNC: NEGATIVE MG/DL
HCT VFR BLD AUTO: 40.5 %
HDLC SERPL-MCNC: 41 MG/DL (ref 40–59)
HGB BLD-MCNC: 13.7 G/DL
HGB UR QL STRIP.AUTO: NEGATIVE
IMM GRANULOCYTES # BLD AUTO: 0.03 X10(3) UL (ref 0–1)
IMM GRANULOCYTES NFR BLD: 0.5 %
KETONES UR-MCNC: NEGATIVE MG/DL
LDLC SERPL CALC-MCNC: 110 MG/DL (ref ?–100)
LEUKOCYTE ESTERASE UR QL STRIP.AUTO: NEGATIVE
LYMPHOCYTES # BLD AUTO: 2.02 X10(3) UL (ref 1–4)
LYMPHOCYTES NFR BLD AUTO: 30.4 %
MCH RBC QN AUTO: 29 PG (ref 26–34)
MCHC RBC AUTO-ENTMCNC: 33.8 G/DL (ref 31–37)
MCV RBC AUTO: 85.8 FL
MONOCYTES # BLD AUTO: 0.4 X10(3) UL (ref 0.1–1)
MONOCYTES NFR BLD AUTO: 6 %
NEUTROPHILS # BLD AUTO: 4.06 X10 (3) UL (ref 1.5–7.7)
NEUTROPHILS # BLD AUTO: 4.06 X10(3) UL (ref 1.5–7.7)
NEUTROPHILS NFR BLD AUTO: 61.1 %
NITRITE UR QL STRIP.AUTO: NEGATIVE
NONHDLC SERPL-MCNC: 133 MG/DL (ref ?–130)
OSMOLALITY SERPL CALC.SUM OF ELEC: 292 MOSM/KG (ref 275–295)
PH UR: 6 [PH] (ref 5–8)
PLATELET # BLD AUTO: 257 10(3)UL (ref 150–450)
POTASSIUM SERPL-SCNC: 4.4 MMOL/L (ref 3.5–5.1)
PROT SERPL-MCNC: 7.4 G/DL (ref 6.4–8.2)
PROT UR-MCNC: NEGATIVE MG/DL
RBC # BLD AUTO: 4.72 X10(6)UL
SODIUM SERPL-SCNC: 140 MMOL/L (ref 136–145)
SP GR UR STRIP: 1.02 (ref 1–1.03)
TRIGL SERPL-MCNC: 131 MG/DL (ref 30–149)
URATE SERPL-MCNC: 6 MG/DL
UROBILINOGEN UR STRIP-ACNC: <2
VIT C UR-MCNC: 40 MG/DL
VLDLC SERPL CALC-MCNC: 22 MG/DL (ref 0–30)
WBC # BLD AUTO: 6.6 X10(3) UL (ref 4–11)

## 2023-03-09 PROCEDURE — 80061 LIPID PANEL: CPT

## 2023-03-09 PROCEDURE — 36415 COLL VENOUS BLD VENIPUNCTURE: CPT

## 2023-03-09 PROCEDURE — 85025 COMPLETE CBC W/AUTO DIFF WBC: CPT

## 2023-03-09 PROCEDURE — 80053 COMPREHEN METABOLIC PANEL: CPT

## 2023-03-09 PROCEDURE — 84550 ASSAY OF BLOOD/URIC ACID: CPT

## 2023-03-09 PROCEDURE — 81001 URINALYSIS AUTO W/SCOPE: CPT

## 2023-03-09 PROCEDURE — 99396 PREV VISIT EST AGE 40-64: CPT | Performed by: FAMILY MEDICINE

## 2023-03-09 RX ORDER — ALPRAZOLAM 0.5 MG/1
0.5 TABLET ORAL 2 TIMES DAILY PRN
Qty: 30 TABLET | Refills: 3 | Status: SHIPPED | OUTPATIENT
Start: 2023-03-09

## 2023-03-09 RX ORDER — ALBUTEROL SULFATE 90 UG/1
2 AEROSOL, METERED RESPIRATORY (INHALATION) EVERY 6 HOURS PRN
Qty: 1 EACH | Refills: 3 | Status: SHIPPED | OUTPATIENT
Start: 2023-03-09

## 2023-03-09 RX ORDER — SILDENAFIL 50 MG/1
50 TABLET, FILM COATED ORAL
Qty: 10 TABLET | Refills: 5 | Status: SHIPPED | OUTPATIENT
Start: 2023-03-09

## 2023-03-09 RX ORDER — LISINOPRIL 20 MG/1
20 TABLET ORAL DAILY
Qty: 90 TABLET | Refills: 3 | Status: SHIPPED | OUTPATIENT
Start: 2023-03-09

## 2023-03-09 RX ORDER — OMEPRAZOLE 40 MG/1
40 CAPSULE, DELAYED RELEASE ORAL
Qty: 90 CAPSULE | Refills: 3 | Status: SHIPPED | OUTPATIENT
Start: 2023-03-09

## 2023-03-09 RX ORDER — FLUOCINOLONE ACETONIDE 0.11 MG/ML
2 OIL AURICULAR (OTIC) DAILY PRN
Qty: 1 EACH | Refills: 1 | Status: SHIPPED | OUTPATIENT
Start: 2023-03-09

## 2023-03-09 RX ORDER — FENOFIBRATE 150 MG/1
1 CAPSULE ORAL DAILY
Qty: 90 CAPSULE | Refills: 3 | Status: SHIPPED | OUTPATIENT
Start: 2023-03-09

## 2023-03-22 ENCOUNTER — OFFICE VISIT (OUTPATIENT)
Dept: FAMILY MEDICINE CLINIC | Facility: CLINIC | Age: 53
End: 2023-03-22
Payer: COMMERCIAL

## 2023-03-22 VITALS
SYSTOLIC BLOOD PRESSURE: 153 MMHG | RESPIRATION RATE: 16 BRPM | HEIGHT: 71 IN | OXYGEN SATURATION: 96 % | HEART RATE: 65 BPM | TEMPERATURE: 97 F | DIASTOLIC BLOOD PRESSURE: 79 MMHG | BODY MASS INDEX: 30.94 KG/M2 | WEIGHT: 221 LBS

## 2023-03-22 DIAGNOSIS — J01.00 ACUTE MAXILLARY SINUSITIS, RECURRENCE NOT SPECIFIED: Primary | ICD-10-CM

## 2023-03-22 DIAGNOSIS — Z72.0 TOBACCO USE: ICD-10-CM

## 2023-03-22 DIAGNOSIS — R03.0 ELEVATED BLOOD PRESSURE READING: ICD-10-CM

## 2023-03-22 LAB
POCT LOT NUMBER: NORMAL
RAPID SARS-COV-2 BY PCR: NOT DETECTED

## 2023-03-22 PROCEDURE — 3077F SYST BP >= 140 MM HG: CPT | Performed by: NURSE PRACTITIONER

## 2023-03-22 PROCEDURE — 3008F BODY MASS INDEX DOCD: CPT | Performed by: NURSE PRACTITIONER

## 2023-03-22 PROCEDURE — 99213 OFFICE O/P EST LOW 20 MIN: CPT | Performed by: NURSE PRACTITIONER

## 2023-03-22 PROCEDURE — 3078F DIAST BP <80 MM HG: CPT | Performed by: NURSE PRACTITIONER

## 2023-03-22 PROCEDURE — U0002 COVID-19 LAB TEST NON-CDC: HCPCS | Performed by: NURSE PRACTITIONER

## 2023-03-22 RX ORDER — AMOXICILLIN AND CLAVULANATE POTASSIUM 875; 125 MG/1; MG/1
1 TABLET, FILM COATED ORAL 2 TIMES DAILY
Qty: 14 TABLET | Refills: 0 | Status: SHIPPED | OUTPATIENT
Start: 2023-03-22 | End: 2023-03-29

## 2023-04-17 ENCOUNTER — NURSE ONLY (OUTPATIENT)
Facility: CLINIC | Age: 53
End: 2023-04-17

## 2023-04-17 DIAGNOSIS — Z86.010 PERSONAL HISTORY OF COLONIC POLYPS: Primary | ICD-10-CM

## 2023-04-19 RX ORDER — SODIUM, POTASSIUM,MAG SULFATES 17.5-3.13G
SOLUTION, RECONSTITUTED, ORAL ORAL
Qty: 1 EACH | Refills: 0 | Status: SHIPPED | OUTPATIENT
Start: 2023-04-19

## 2023-05-18 RX ORDER — AMOXICILLIN 500 MG/1
CAPSULE ORAL
COMMUNITY
Start: 2023-03-30

## 2023-05-18 RX ORDER — ETODOLAC 400 MG/1
TABLET, FILM COATED ORAL
COMMUNITY
Start: 2023-03-30

## 2023-05-18 RX ORDER — HYDROCODONE BITARTRATE AND ACETAMINOPHEN 5; 325 MG/1; MG/1
1 TABLET ORAL EVERY 6 HOURS PRN
COMMUNITY
Start: 2023-03-30

## 2023-08-15 ENCOUNTER — TELEPHONE (OUTPATIENT)
Dept: FAMILY MEDICINE CLINIC | Facility: CLINIC | Age: 53
End: 2023-08-15

## 2023-08-22 ENCOUNTER — TELEMEDICINE (OUTPATIENT)
Dept: FAMILY MEDICINE CLINIC | Facility: CLINIC | Age: 53
End: 2023-08-22

## 2023-08-22 DIAGNOSIS — U07.1 COVID-19: Primary | ICD-10-CM

## 2023-08-22 PROCEDURE — 99214 OFFICE O/P EST MOD 30 MIN: CPT | Performed by: FAMILY MEDICINE

## 2023-08-22 NOTE — PROGRESS NOTES
This is a telemedicine visit with live, interactive video and audio. Patient understands and accepts financial responsibility for any deductible, co-insurance and/or co-pays associated with this service. SUBJECTIVE  Patient connects to discuss positive COVID 19 test yesterday. Traveled on plane from [place] over the weekend. Symptoms started Saturday. Cough and congestion. Afebrile. No chest pain or SOB.       HISTORY:  Past Medical History:   Diagnosis Date    Anxiety     Anxiety state     Back problem     lower back fusion 2 years ago    Colitis     Diverticulosis of large intestine     Esophageal reflux     Essential hypertension     Headache     per [employer]:  \"Headaches\"    High blood pressure     High cholesterol     Hyperlipidemia     MRSA infection     per [employer]:  \"MRSA skin infections; Management:  I and D, right (10/2011)\"      Past Surgical History:   Procedure Laterality Date    BACK SURGERY      COLONOSCOPY N/A 2017    Procedure: COLONOSCOPY;  Surgeon: Aiden Ibrahim MD;  Location: [facility] ENDOSCOPY    ELECTROCARDIOGRAM, COMPLETE  2012    Scanned to Media Tab - Date of Service 2012    MYRINGOTOMY, LASER-ASSISTED      OTHER SURGICAL HISTORY      cervical fusion     SPINE SURGERY PROCEDURE UNLISTED      lumbar fusion    UNLISTED PROC, ARTHROSCOPY Right     per [employer]:  \"arthroscopy x3 necrotizing infection right knee\"      Family History   Problem Relation Age of Onset    Colon Cancer Maternal Grandmother 48    Diabetes Maternal Uncle     Heart Disease Maternal Aunt         CAD, (cause of death, age 46)    Heart Disease Maternal Uncle         CAD      Social History     Socioeconomic History    Marital status:    Tobacco Use    Smoking status: Every Day     Packs/day: 0.50     Years: 30.00     Pack years: 15.00     Types: Cigarettes     Last attempt to quit: 2017     Years since quittin.7    Smokeless tobacco: Never   Vaping Use    Vaping Use: Former   Substance and Sexual Activity    Alcohol use: Yes     Alcohol/week: 1.0 standard drink of alcohol     Types: 1 Cans of beer per week     Comment: 2 beers, weekly    Drug use: Yes     Types: Cannabis   Other Topics Concern    Caffeine Concern Yes     Comment: coffee, 3 cups daily    Exercise Yes     Comment: walking   Social History Narrative    The patient does not use an assistive device. .      The patient does live in a home with stairs. No Known Allergies   Current Outpatient Medications   Medication Sig Dispense Refill    nirmatrelvir-ritonavir 150-100 MG Oral Tablet Therapy Pack Take one nirmatrelvir tablet (150mg) with one ritonavir tablet (100mg) together twice daily for 5 days. 20 tablet 0    HYDROcodone-acetaminophen 5-325 MG Oral Tab Take 1 tablet by mouth every 6 (six) hours as needed. Etodolac 400 MG Oral Tab       amoxicillin 500 MG Oral Cap TAKE 1 CAPSULE TWICE DAILY UNTIL GONE. Na Sulfate-K Sulfate-Mg Sulf (SUPREP BOWEL PREP KIT) 17.5-3.13-1.6 GM/177ML Oral Solution Take as directed 1 each 0    ALPRAZolam 0.5 MG Oral Tab Take 1 tablet (0.5 mg total) by mouth 2 (two) times daily as needed. 30 tablet 3    Fenofibrate 150 MG Oral Cap Take 1 capsule by mouth daily. 90 capsule 3    lisinopril 20 MG Oral Tab Take 1 tablet (20 mg total) by mouth daily. 90 tablet 3    Omeprazole 40 MG Oral Capsule Delayed Release Take 1 capsule (40 mg total) by mouth before breakfast. 90 capsule 3    Sildenafil Citrate 50 MG Oral Tab Take 1 tablet (50 mg total) by mouth daily as needed for Erectile Dysfunction. 10 tablet 5    albuterol (PROAIR HFA) 108 (90 Base) MCG/ACT Inhalation Aero Soln Inhale 2 puffs into the lungs every 6 (six) hours as needed for Wheezing.  1 each 3    FLUOXETINE 20 MG Oral Cap TAKE 1 CAPSULE(20 MG) BY MOUTH DAILY 30 capsule 11       OBJECTIVE  Physical Exam:   alert, appears stated age, and cooperative, Speaking in full sentences comfortably, and Normal work of breathing    I spent a total of 8 minutes on the video visit. ASSESSMENT & PLAN  Diagnoses and all orders for this visit:    [id number]    Other orders  -     nirmatrelvir-ritonavir 150-100 MG Oral Tablet Therapy Pack; Take one nirmatrelvir tablet (150mg) with one ritonavir tablet (100mg) together twice daily for 5 days. Recommend vitamin D and start paxlovid. Reviewed meds. He can use dayquil at home as needed. Five days off work and mask for five days after while in public. Call back if SOB or CP.         Luciana Flanagan, DO

## 2023-09-28 ENCOUNTER — ANESTHESIA EVENT (OUTPATIENT)
Dept: ENDOSCOPY | Facility: HOSPITAL | Age: 53
End: 2023-09-28
Payer: COMMERCIAL

## 2023-09-28 ENCOUNTER — ANESTHESIA (OUTPATIENT)
Dept: ENDOSCOPY | Facility: HOSPITAL | Age: 53
End: 2023-09-28
Payer: COMMERCIAL

## 2023-09-28 ENCOUNTER — HOSPITAL ENCOUNTER (OUTPATIENT)
Facility: HOSPITAL | Age: 53
Setting detail: HOSPITAL OUTPATIENT SURGERY
Discharge: HOME | End: 2023-09-28
Attending: INTERNAL MEDICINE | Admitting: INTERNAL MEDICINE
Payer: COMMERCIAL

## 2023-09-28 VITALS
BODY MASS INDEX: 30.1 KG/M2 | RESPIRATION RATE: 15 BRPM | OXYGEN SATURATION: 95 % | HEIGHT: 71 IN | HEART RATE: 54 BPM | DIASTOLIC BLOOD PRESSURE: 87 MMHG | SYSTOLIC BLOOD PRESSURE: 129 MMHG | TEMPERATURE: 97 F | WEIGHT: 215 LBS

## 2023-09-28 DIAGNOSIS — Z86.010 PERSONAL HISTORY OF COLONIC POLYPS: ICD-10-CM

## 2023-09-28 PROCEDURE — 0DBN8ZX EXCISION OF SIGMOID COLON, VIA NATURAL OR ARTIFICIAL OPENING ENDOSCOPIC, DIAGNOSTIC: ICD-10-PCS | Performed by: INTERNAL MEDICINE

## 2023-09-28 PROCEDURE — 0DBM8ZX EXCISION OF DESCENDING COLON, VIA NATURAL OR ARTIFICIAL OPENING ENDOSCOPIC, DIAGNOSTIC: ICD-10-PCS | Performed by: INTERNAL MEDICINE

## 2023-09-28 PROCEDURE — 0DBK8ZX EXCISION OF ASCENDING COLON, VIA NATURAL OR ARTIFICIAL OPENING ENDOSCOPIC, DIAGNOSTIC: ICD-10-PCS | Performed by: INTERNAL MEDICINE

## 2023-09-28 PROCEDURE — 0DBH8ZX EXCISION OF CECUM, VIA NATURAL OR ARTIFICIAL OPENING ENDOSCOPIC, DIAGNOSTIC: ICD-10-PCS | Performed by: INTERNAL MEDICINE

## 2023-09-28 PROCEDURE — 45380 COLONOSCOPY AND BIOPSY: CPT | Performed by: INTERNAL MEDICINE

## 2023-09-28 RX ORDER — SODIUM CHLORIDE, SODIUM LACTATE, POTASSIUM CHLORIDE, CALCIUM CHLORIDE 600; 310; 30; 20 MG/100ML; MG/100ML; MG/100ML; MG/100ML
INJECTION, SOLUTION INTRAVENOUS CONTINUOUS
Status: DISCONTINUED | OUTPATIENT
Start: 2023-09-28 | End: 2023-09-28

## 2023-09-28 RX ORDER — LIDOCAINE HYDROCHLORIDE 10 MG/ML
INJECTION, SOLUTION EPIDURAL; INFILTRATION; INTRACAUDAL; PERINEURAL AS NEEDED
Status: DISCONTINUED | OUTPATIENT
Start: 2023-09-28 | End: 2023-09-28 | Stop reason: SURG

## 2023-09-28 RX ADMIN — LIDOCAINE HYDROCHLORIDE 20 MG: 10 INJECTION, SOLUTION EPIDURAL; INFILTRATION; INTRACAUDAL; PERINEURAL at 10:05:00

## 2023-09-28 RX ADMIN — SODIUM CHLORIDE, SODIUM LACTATE, POTASSIUM CHLORIDE, CALCIUM CHLORIDE: 600; 310; 30; 20 INJECTION, SOLUTION INTRAVENOUS at 10:32:00

## 2023-09-28 RX ADMIN — SODIUM CHLORIDE, SODIUM LACTATE, POTASSIUM CHLORIDE, CALCIUM CHLORIDE: 600; 310; 30; 20 INJECTION, SOLUTION INTRAVENOUS at 10:02:00

## 2023-09-28 NOTE — DISCHARGE INSTRUCTIONS

## 2023-09-28 NOTE — H&P
History & Physical Examination    Patient Name: Gerhardt Lash  MRN: [id number]  CSN: [id number]  YOB: 1970    Diagnosis:   Colon polyps   Colon cancer screening      ALPRAZolam 0.5 MG Oral Tab, Take 1 tablet (0.5 mg total) by mouth 2 (two) times daily as needed. , Disp: 30 tablet, Rfl: 3, 2023  Fenofibrate 150 MG Oral Cap, Take 1 capsule by mouth daily. , Disp: 90 capsule, Rfl: 3, 2023 at 0800  lisinopril 20 MG Oral Tab, Take 1 tablet (20 mg total) by mouth daily. , Disp: 90 tablet, Rfl: 3, 2023 at 0630  Omeprazole 40 MG Oral Capsule Delayed Release, Take 1 capsule (40 mg total) by mouth before breakfast., Disp: 90 capsule, Rfl: 3, 2023  albuterol (PROAIR HFA) 108 (90 Base) MCG/ACT Inhalation Aero Soln, Inhale 2 puffs into the lungs every 6 (six) hours as needed for Wheezing., Disp: 1 each, Rfl: 3, prn  [] nirmatrelvir-ritonavir 150-100 MG Oral Tablet Therapy Pack, Take one nirmatrelvir tablet (150mg) with one ritonavir tablet (100mg) together twice daily for 5 days. , Disp: 20 tablet, Rfl: 0  Na Sulfate-K Sulfate-Mg Sulf (SUPREP BOWEL PREP KIT) 17.5-3.13-1.6 GM/177ML Oral Solution, Take as directed, Disp: 1 each, Rfl: 0  Sildenafil Citrate 50 MG Oral Tab, Take 1 tablet (50 mg total) by mouth daily as needed for Erectile Dysfunction. , Disp: 10 tablet, Rfl: 5, prn  FLUOXETINE 20 MG Oral Cap, TAKE 1 CAPSULE(20 MG) BY MOUTH DAILY (Patient not taking: Reported on 2023), Disp: 30 capsule, Rfl: 11, Not Taking      lactated ringers infusion, , Intravenous, Continuous        Allergies: No Known Allergies    Past Medical History:   Diagnosis Date    Anxiety     Anxiety state     Back problem     lower back fusion 2 years ago    Colitis     COPD (chronic obstructive pulmonary disease) (HCC)     Diverticulosis of large intestine     Esophageal reflux     Essential hypertension     Headache     per [employer]:  \"Headaches\"    High blood pressure     High cholesterol     Hyperlipidemia MRSA infection     per NextGen:  \"MRSA skin infections; Management:  I and D, right (10/2011)\"     Past Surgical History:   Procedure Laterality Date    BACK SURGERY      COLONOSCOPY N/A 2017    Procedure: COLONOSCOPY;  Surgeon: Wes Saldivar MD;  Location: [address] [place] [address] ENDOSCOPY    ELECTROCARDIOGRAM, COMPLETE  2012    Scanned to Media Tab - Date of Service 2012    MYRINGOTOMY, LASER-ASSISTED      OTHER SURGICAL HISTORY      cervical fusion     SPINE SURGERY PROCEDURE UNLISTED      lumbar fusion    UNLISTED PROC, ARTHROSCOPY Right     per NextGen:  \"arthroscopy x3 necrotizing infection right knee\"     Family History   Problem Relation Age of Onset    Colon Cancer Maternal Grandmother 48    Diabetes Maternal Uncle     Heart Disease Maternal Aunt         CAD, (cause of death, age 46)    Heart Disease Maternal Uncle         CAD     Social History    Tobacco Use      Smoking status: Every Day        Packs/day: 1.00        Years: 30.00        Additional pack years: 0.00        Total pack years: 30.00        Types: Cigarettes        Last attempt to quit: 2017        Years since quittin.8      Smokeless tobacco: Never    Alcohol use: Yes      Alcohol/week: 1.0 standard drink of alcohol      Types: 1 Cans of beer per week      Comment: 2 beers, weekly      SYSTEM Check if Review is Normal Check if Physical Exam is Normal If not normal, please explain:   HEENT [x ] [ x]    NECK & BACK [x ] [x ]    HEART [x ] [ x]    LUNGS [x ] [ x]    ABDOMEN [x ] [x ]    UROGENITAL [ ] [ ]    EXTREMITIES [x ] [x ]    OTHER        [ x ] I have discussed the risks and benefits and alternatives with the patient/family. They understand and agree to proceed with plan of care. [ x ] I have reviewed the History and Physical done within the last 30 days. Any changes noted above. Sean Sifuentes.  Polo Stanley MD  2023  9:52 AM

## 2023-09-28 NOTE — OPERATIVE REPORT
[facility] Endoscopy Report  Date of procedure-September 28, 2023    Preoperative Diagnosis:  -Colon cancer screening  -History of colon polyps      Postoperative Diagnosis:  -Colon polyps x11  -Diverticulosis  -Small internal hemorrhoids      Procedure:    Colonoscopy         Surgeon:  Paulina Donis M.D. Anesthesia:  MAC     Technique:  After informed consent, the patient was placed in the left lateral recumbent position. Digital rectal examination revealed no palpable intraluminal abnormalities. An Olympus variable stiffness 190 series HD colonoscope was inserted into the rectum and advanced under direct vision by following the lumen to the cecum. The colon was examined upon withdrawal in the left lateral position. The procedure was well tolerated without immediate complication. Findings:  The preparation of the colon was good. The terminal ileum was examined for 4 cm and visually normal.  The ileocecal valve was well preserved. The visualized colonic mucosa from the cecum to the anal verge was normal with an intact vascular pattern. Colon polyps x11 removed as follows;  -Cecal x3, each polyp was diminutive and removed by cold forceps technique.  -Ascending x1, diminutive removed by cold forceps technique.  -Descending x1, diminutive removed by cold forceps technique. -Rectosigmoid x6, each polyp had hyperplastic features and was diminutive and removed by cold forceps technique. All polypectomy sites inspected and found to be free of bleeding and specimens retrieved and sent for analysis. Diverticulosis located in the left colon, no diverticulitis. Small internal hemorrhoids noted on retroflexed view.     Estimated blood loss-insignificant  Specimens-colon polyps      Impression:  -Colon polyps x11  -Diverticulosis  -Small internal hemorrhoids    Recommendations:  - Post polypectomy instructions given  - Repeat colonoscopy in 3- 5 years  - High fiber diet for diverticular disease  - Symptomatic treatment of hemorrhoids          Marcelino Salinas.  Ozzie Davis MD  9/28/2023  10:28 AM

## 2023-10-01 ENCOUNTER — TELEPHONE (OUTPATIENT)
Facility: CLINIC | Age: 53
End: 2023-10-01

## 2023-10-02 ENCOUNTER — MED REC SCAN ONLY (OUTPATIENT)
Facility: CLINIC | Age: 53
End: 2023-10-02

## 2023-10-02 NOTE — TELEPHONE ENCOUNTER
Recall colon in 5 years per Dr Sukhi Alvarenga.  Colon done 09/28/2023    Health maintenance updated and message sent to pt outreach to repeat colonoscopy in 5 years

## 2023-10-02 NOTE — TELEPHONE ENCOUNTER
----- Message from Lianet Starks MD sent at 9/29/2023  9:32 AM CDT -----  I wanted to get back to you with your colonoscopy results. You had 11 colon polyps removed which were benign. I would advise a repeat colonoscopy in 5 years to make sure no new polyps are forming. You also have internal hemorrhoids and diverticulosis. Please stay on a high fiber diet and call with any questions.

## 2023-11-20 ENCOUNTER — TELEPHONE (OUTPATIENT)
Dept: FAMILY MEDICINE CLINIC | Facility: CLINIC | Age: 53
End: 2023-11-20

## 2023-12-24 NOTE — TELEPHONE ENCOUNTER
Please review. Protocol failed / No Protocol.     Requested Prescriptions   Pending Prescriptions Disp Refills    ALPRAZOLAM 0.5 MG Oral Tab [Pharmacy Med Name: ALPRAZOLAM 0.5MG TABLETS] 30 tablet 0     Sig: TAKE 1 TABLET(0.5 MG) BY MOUTH TWICE DAILY AS NEEDED       There is no refill protocol information for this order

## 2023-12-28 RX ORDER — ALPRAZOLAM 0.5 MG/1
0.5 TABLET ORAL 2 TIMES DAILY PRN
Qty: 30 TABLET | Refills: 0 | Status: SHIPPED | OUTPATIENT
Start: 2023-12-28

## 2024-02-07 ENCOUNTER — HOSPITAL ENCOUNTER (OUTPATIENT)
Dept: GENERAL RADIOLOGY | Age: 54
Discharge: HOME | End: 2024-02-07
Attending: NURSE PRACTITIONER
Payer: COMMERCIAL

## 2024-02-07 ENCOUNTER — OFFICE VISIT (OUTPATIENT)
Dept: FAMILY MEDICINE CLINIC | Facility: CLINIC | Age: 54
End: 2024-02-07
Payer: COMMERCIAL

## 2024-02-07 VITALS
DIASTOLIC BLOOD PRESSURE: 77 MMHG | HEART RATE: 62 BPM | SYSTOLIC BLOOD PRESSURE: 142 MMHG | HEIGHT: 71 IN | BODY MASS INDEX: 30.66 KG/M2 | WEIGHT: 219 LBS

## 2024-02-07 DIAGNOSIS — E78.00 HYPERCHOLESTEROLEMIA: ICD-10-CM

## 2024-02-07 DIAGNOSIS — R07.81 RIB PAIN ON LEFT SIDE: Primary | ICD-10-CM

## 2024-02-07 DIAGNOSIS — Z72.0 TOBACCO USE: ICD-10-CM

## 2024-02-07 DIAGNOSIS — I10 ESSENTIAL HYPERTENSION: ICD-10-CM

## 2024-02-07 DIAGNOSIS — R07.81 RIB PAIN ON LEFT SIDE: ICD-10-CM

## 2024-02-07 PROCEDURE — 3008F BODY MASS INDEX DOCD: CPT | Performed by: NURSE PRACTITIONER

## 2024-02-07 PROCEDURE — 71100 X-RAY EXAM RIBS UNI 2 VIEWS: CPT | Performed by: NURSE PRACTITIONER

## 2024-02-07 PROCEDURE — 3078F DIAST BP <80 MM HG: CPT | Performed by: NURSE PRACTITIONER

## 2024-02-07 PROCEDURE — 99214 OFFICE O/P EST MOD 30 MIN: CPT | Performed by: NURSE PRACTITIONER

## 2024-02-07 PROCEDURE — 3077F SYST BP >= 140 MM HG: CPT | Performed by: NURSE PRACTITIONER

## 2024-02-07 RX ORDER — LIDOCAINE 50 MG/G
1 PATCH TOPICAL EVERY 24 HOURS
Qty: 15 PATCH | Refills: 0 | Status: SHIPPED | OUTPATIENT
Start: 2024-02-07

## 2024-02-07 RX ORDER — IBUPROFEN 600 MG/1
600 TABLET ORAL EVERY 6 HOURS PRN
Qty: 40 TABLET | Refills: 0 | Status: SHIPPED | OUTPATIENT
Start: 2024-02-07 | End: 2024-03-08

## 2024-02-07 NOTE — PROGRESS NOTES
HPI    Patient presents with concerns of left rib pain.  Was trimming some trees and a branch swung around and knocked him in the left chest.  Knocked the wind out of him.  Feels like he is unable to breathe in fully.  Is taking motrin as needed.  With stomach pain from motrin.  With a history of COPD.  Is having some intermittent wheezing.      Also would like a scan of the heart.  With a history of hypertension and high cholesterol.      Review of Systems   Musculoskeletal:         Left rib pain.   All other systems reviewed and are negative.       Vitals:    02/07/24 1002   BP: 142/77   Pulse: 62   Weight: 219 lb (99.3 kg)   Height: 5' 11\" (1.803 m)     Body mass index is 30.54 kg/m².    Health Maintenance   Topic Date Due    Pneumococcal Vaccine: Birth to 64yrs (1 of 2 - PCV) Never done    DTaP,Tdap,and Td Vaccines (1 - Tdap) Never done    Zoster Vaccines (1 of 2) Never done    HTN: BP Follow-Up  04/22/2023    COVID-19 Vaccine (5 - 2023-24 season) 09/01/2023    Influenza Vaccine (1) 10/01/2023    Annual Depression Screening  01/01/2024    Annual Physical  03/09/2024    Tobacco Cessation Counseling 1 Year  03/22/2024    PSA  03/09/2025    Colorectal Cancer Screening  09/28/2028       Past Medical History:   Diagnosis Date    Anxiety     Anxiety state     Back problem     lower back fusion 2 years ago    Colitis     COPD (chronic obstructive pulmonary disease) (HCC)     Diverticulosis of large intestine     Esophageal reflux     Essential hypertension     Headache     per [employer]:  \"Headaches\"    High blood pressure     High cholesterol     Hyperlipidemia     MRSA infection     per NextGen:  \"MRSA skin infections; Management:  I and D, right (10/2011)\"       .  Past Surgical History:   Procedure Laterality Date    Back surgery      Colonoscopy N/A 9/22/2017    Procedure: COLONOSCOPY;  Surgeon: Da Jacobs MD;  Location: [facility] ENDOSCOPY    Colonoscopy N/A 9/28/2023    Procedure: COLONOSCOPY;  Surgeon: Arlene  Da CARCAMO MD;  Location: [facility] ENDOSCOPY    Electrocardiogram, complete  2012    Scanned to Media Tab - Date of Service 2012    Myringotomy, laser-assisted      Other surgical history      cervical fusion     Spine surgery procedure unlisted      lumbar fusion    Unlisted proc, arthroscopy Right     per NextGen:  \"arthroscopy x3 necrotizing infection right knee\"       Family History   Problem Relation Age of Onset    Colon Cancer Maternal Grandmother 50    Diabetes Maternal Uncle     Heart Disease Maternal Aunt         CAD, (cause of death, age 51)    Heart Disease Maternal Uncle         CAD       Social History     Socioeconomic History    Marital status:      Spouse name: Not on file    Number of children: Not on file    Years of education: Not on file    Highest education level: Not on file   Occupational History    Not on file   Tobacco Use    Smoking status: Every Day     Packs/day: 1.00     Years: 30.00     Additional pack years: 0.00     Total pack years: 30.00     Types: Cigarettes     Last attempt to quit: 2017     Years since quittin.2    Smokeless tobacco: Never   Vaping Use    Vaping Use: Former   Substance and Sexual Activity    Alcohol use: Yes     Alcohol/week: 1.0 standard drink of alcohol     Types: 1 Cans of beer per week     Comment: 2 beers, weekly    Drug use: Yes     Types: Cannabis    Sexual activity: Not on file   Other Topics Concern     Service Not Asked    Blood Transfusions Not Asked    Caffeine Concern Yes     Comment: coffee, 3 cups daily    Occupational Exposure Not Asked    Hobby Hazards Not Asked    Sleep Concern Not Asked    Stress Concern Not Asked    Weight Concern Not Asked    Special Diet Not Asked    Back Care Not Asked    Exercise Yes     Comment: walking    Bike Helmet Not Asked    Seat Belt Not Asked    Self-Exams Not Asked   Social History Narrative    The patient does not use an assistive device..      The patient does live in a home  with stairs.     Social Determinants of Health     Financial Resource Strain: Not on file   Food Insecurity: Not on file   Transportation Needs: Not on file   Physical Activity: Not on file   Stress: Not on file   Social Connections: Not on file   Housing Stability: Not on file       Current Outpatient Medications   Medication Sig Dispense Refill    lidocaine 5 % External Patch Place 1 patch onto the skin daily. 15 patch 0    ibuprofen 600 MG Oral Tab Take 1 tablet (600 mg total) by mouth every 6 (six) hours as needed for Pain. 40 tablet 0    ALPRAZolam 0.5 MG Oral Tab Take 1 tablet (0.5 mg total) by mouth 2 (two) times daily as needed. 30 tablet 0    lisinopril 20 MG Oral Tab Take 1 tablet (20 mg total) by mouth daily. 90 tablet 3    Omeprazole 40 MG Oral Capsule Delayed Release Take 1 capsule (40 mg total) by mouth before breakfast. 90 capsule 3    Sildenafil Citrate 50 MG Oral Tab Take 1 tablet (50 mg total) by mouth daily as needed for Erectile Dysfunction. 10 tablet 5    albuterol (PROAIR HFA) 108 (90 Base) MCG/ACT Inhalation Aero Soln Inhale 2 puffs into the lungs every 6 (six) hours as needed for Wheezing. 1 each 3       Allergies:  No Known Allergies    Physical Exam  Vitals and nursing note reviewed.   Constitutional:       General: He is not in acute distress.     Appearance: Normal appearance.   HENT:      Head: Normocephalic and atraumatic.   Cardiovascular:      Heart sounds: Normal heart sounds.   Pulmonary:      Effort: Pulmonary effort is normal. No respiratory distress.      Breath sounds: Normal breath sounds. No stridor. No wheezing, rhonchi or rales.   Chest:      Chest wall: Tenderness present.   Neurological:      Mental Status: He is alert and oriented to person, place, and time.   Psychiatric:         Mood and Affect: Mood normal.         Behavior: Behavior normal.         Thought Content: Thought content normal.         Judgment: Judgment normal.          Assessment and Plan:   Problem List  Items Addressed This Visit          Tobacco    Tobacco use    Relevant Orders    CT CALCIUM SCORING     Other Visit Diagnoses       Rib pain on left side    -  Primary    Relevant Medications    lidocaine 5 % External Patch    ibuprofen 600 MG Oral Tab    Other Relevant Orders    XR RIBS, UNILATERAL (2 VIEWS), LEFT (CPT=71100)    Essential hypertension        Relevant Orders    CT CALCIUM SCORING    Hypercholesterolemia        Relevant Orders    CT CALCIUM SCORING          Xray today.  Lidocaine patch to site of pain, ibuprofen as needed.  Discussed deep breathing exercises and supportive care.  CT calcium scoring ordered.       Discussed plan of care with patient and patient is in agreement.  All questions answered. Patient to call with questions or concerns.    Encouraged to sign up for My Chart if not already registered.

## 2024-02-20 ENCOUNTER — OFFICE VISIT (OUTPATIENT)
Dept: FAMILY MEDICINE CLINIC | Facility: CLINIC | Age: 54
End: 2024-02-20
Payer: COMMERCIAL

## 2024-02-20 VITALS
WEIGHT: 224 LBS | HEIGHT: 71 IN | TEMPERATURE: 97 F | SYSTOLIC BLOOD PRESSURE: 154 MMHG | RESPIRATION RATE: 18 BRPM | BODY MASS INDEX: 31.36 KG/M2 | HEART RATE: 68 BPM | OXYGEN SATURATION: 96 % | DIASTOLIC BLOOD PRESSURE: 86 MMHG

## 2024-02-20 DIAGNOSIS — H65.01 RIGHT ACUTE SEROUS OTITIS MEDIA, RECURRENCE NOT SPECIFIED: Primary | ICD-10-CM

## 2024-02-20 DIAGNOSIS — J01.00 ACUTE MAXILLARY SINUSITIS, RECURRENCE NOT SPECIFIED: ICD-10-CM

## 2024-02-20 DIAGNOSIS — R03.0 ELEVATED BLOOD PRESSURE READING: ICD-10-CM

## 2024-02-20 PROCEDURE — 3077F SYST BP >= 140 MM HG: CPT | Performed by: NURSE PRACTITIONER

## 2024-02-20 PROCEDURE — 99213 OFFICE O/P EST LOW 20 MIN: CPT | Performed by: NURSE PRACTITIONER

## 2024-02-20 PROCEDURE — 3008F BODY MASS INDEX DOCD: CPT | Performed by: NURSE PRACTITIONER

## 2024-02-20 PROCEDURE — 3079F DIAST BP 80-89 MM HG: CPT | Performed by: NURSE PRACTITIONER

## 2024-02-20 RX ORDER — PREDNISONE 20 MG/1
40 TABLET ORAL DAILY
Qty: 10 TABLET | Refills: 0 | Status: SHIPPED | OUTPATIENT
Start: 2024-02-20 | End: 2024-02-25

## 2024-02-20 RX ORDER — FENOFIBRATE 150 MG/1
1 CAPSULE ORAL DAILY
COMMUNITY
Start: 2024-02-07

## 2024-02-20 RX ORDER — AMOXICILLIN AND CLAVULANATE POTASSIUM 875; 125 MG/1; MG/1
1 TABLET, FILM COATED ORAL 2 TIMES DAILY
Qty: 20 TABLET | Refills: 0 | Status: SHIPPED | OUTPATIENT
Start: 2024-02-20 | End: 2024-03-01

## 2024-02-20 NOTE — PROGRESS NOTES
CHIEF COMPLAINT:     Chief Complaint   Patient presents with    Ear Problem     5 days w/ear pain (right) sinus, pressure, and cough. Exposed to flu by son.       HPI:   Maicol Sellers is a 53 year old male who presents for upper respiratory symptoms for  5 days. Patient reports  sinus pain, right ear pain, cough . Symptoms have been worse since onset.  Treating symptoms with theraflu and albuterol inhaler.   Associated symptoms include see above.    Hx of ear tubes as adult, ET tubes fell out approx 1 yr ago.     Son with recent influenza infection.     Eating and drinking well.     Influenza vaccine - none.     Hx of COPD.   Current Outpatient Medications   Medication Sig Dispense Refill    Fenofibrate 150 MG Oral Cap Take 1 capsule by mouth daily.      amoxicillin clavulanate 875-125 MG Oral Tab Take 1 tablet by mouth 2 (two) times daily for 10 days. 20 tablet 0    predniSONE 20 MG Oral Tab Take 2 tablets (40 mg total) by mouth daily for 5 days. 10 tablet 0    ibuprofen 600 MG Oral Tab Take 1 tablet (600 mg total) by mouth every 6 (six) hours as needed for Pain. 40 tablet 0    ALPRAZolam 0.5 MG Oral Tab Take 1 tablet (0.5 mg total) by mouth 2 (two) times daily as needed. 30 tablet 0    lisinopril 20 MG Oral Tab Take 1 tablet (20 mg total) by mouth daily. 90 tablet 3    Omeprazole 40 MG Oral Capsule Delayed Release Take 1 capsule (40 mg total) by mouth before breakfast. 90 capsule 3    Sildenafil Citrate 50 MG Oral Tab Take 1 tablet (50 mg total) by mouth daily as needed for Erectile Dysfunction. 10 tablet 5    albuterol (PROAIR HFA) 108 (90 Base) MCG/ACT Inhalation Aero Soln Inhale 2 puffs into the lungs every 6 (six) hours as needed for Wheezing. 1 each 3    lidocaine 5 % External Patch Place 1 patch onto the skin daily. (Patient not taking: Reported on 2/20/2024) 15 patch 0      Past Medical History:   Diagnosis Date    Anxiety     Anxiety state     Back problem     lower back fusion 2 years ago    Colitis      COPD (chronic obstructive pulmonary disease) (HCC)     Diverticulosis of large intestine     Esophageal reflux     Essential hypertension     Headache     per NextGen:  \"Headaches\"    High blood pressure     High cholesterol     Hyperlipidemia     MRSA infection     per NextGen:  \"MRSA skin infections; Management:  I and D, right (10/2011)\"      Past Surgical History:   Procedure Laterality Date    BACK SURGERY      COLONOSCOPY N/A 2017    Procedure: COLONOSCOPY;  Surgeon: Da Jacobs MD;  Location: [facility] ENDOSCOPY    COLONOSCOPY N/A 2023    Procedure: COLONOSCOPY;  Surgeon: Da Jacobs MD;  Location: [facility] ENDOSCOPY    ELECTROCARDIOGRAM, COMPLETE  2012    Scanned to Media Tab - Date of Service 2012    MYRINGOTOMY, LASER-ASSISTED      OTHER SURGICAL HISTORY      cervical fusion     SPINE SURGERY PROCEDURE UNLISTED      lumbar fusion    UNLISTED PROC, ARTHROSCOPY Right     per NextGen:  \"arthroscopy x3 necrotizing infection right knee\"         Social History     Socioeconomic History    Marital status:    Tobacco Use    Smoking status: Every Day     Packs/day: 1.00     Years: 30.00     Additional pack years: 0.00     Total pack years: 30.00     Types: Cigarettes     Last attempt to quit: 2017     Years since quittin.2    Smokeless tobacco: Never   Vaping Use    Vaping Use: Former   Substance and Sexual Activity    Alcohol use: Yes     Alcohol/week: 1.0 standard drink of alcohol     Types: 1 Cans of beer per week     Comment: 2 beers, weekly    Drug use: Yes     Types: Cannabis   Other Topics Concern    Caffeine Concern Yes     Comment: coffee, 3 cups daily    Exercise Yes     Comment: walking   Social History Narrative    The patient does not use an assistive device..      The patient does live in a home with stairs.         REVIEW OF SYSTEMS:   GENERAL: good appetite  SKIN: no rashes or abnormal skin lesions  HEENT: See HPI  LUNGS: denies wheezing, See HPI. SOB  improved.   CARDIOVASCULAR: denies chest pain or palpitations   GI: denies N/V/C or abdominal pain  NEURO: + headaches    EXAM:   /86   Pulse 68   Temp 97 °F (36.1 °C)   Resp 18   Ht 5' 11\" (1.803 m)   Wt 224 lb (101.6 kg)   SpO2 96%   BMI 31.24 kg/m²   GENERAL: well developed, well nourished,in no apparent distress  SKIN: no rashes,no suspicious lesions  HEAD: atraumatic, normocephalic.  + tenderness on palpation of Right sinuses  EYES: conjunctiva clear, EOM intact  EARS: Right TM erythematous , + bulging, no retraction,serous fluid, bony landmarks dulled; Left TM gray, no bulging, no retraction,no fluid, bony landmarks visible  NOSE: Nostrils patent, clear nasal discharge, nasal mucosa pink   THROAT: Oral mucosa pink, moist. Posterior pharynx is not erythematous. no exudates. Tonsils 1/4.    NECK: Supple, non-tender  LUNGS: clear to auscultation bilaterally, no wheezes or rhonchi. Breathing is non labored.  CARDIO: RRR without murmur  EXTREMITIES: no cyanosis, clubbing or edema  LYMPH:  no lymphadenopathy.      ASSESSMENT AND PLAN:   Maicol Sellers is a 53 year old male who presents with upper respiratory symptoms that are consistent with    ASSESSMENT:   Encounter Diagnoses   Name Primary?    Right acute serous otitis media, recurrence not specified Yes    Acute maxillary sinusitis, recurrence not specified     Elevated blood pressure reading        PLAN: Meds as below.  Comfort care as described in Patient Instructions    Meds & Refills for this Visit:  Requested Prescriptions     Signed Prescriptions Disp Refills    amoxicillin clavulanate 875-125 MG Oral Tab 20 tablet 0     Sig: Take 1 tablet by mouth 2 (two) times daily for 10 days.    predniSONE 20 MG Oral Tab 10 tablet 0     Sig: Take 2 tablets (40 mg total) by mouth daily for 5 days.     Will start Augmentin as directed. Prednisone as well for sinus pressure/cough. Pt does have hx of COPD. Pt is using albuterol inhaler more often since his  illness began.     Discussed with patient that his BP is elevated. Recommended follow up with PCP within 1 week for further evaluation.     Discussed s/s of worsening infection/condition with Patient and importance of prompt medical re-evaluation including when to seek emergency care. Patient voiced understanding    Increase fluids and rest. Warm compress to face multiple times per day.     May consider OTC tylenol or ibuprofen as needed and directed on packaging for pain/fever    May consider OTC guaifenesin as needed and directed on packaging to thin mucus secretions.    Risks, benefits, and side effects of medication discussed. Patient verbalized understanding and agreement with treatment plan.     All questions and concerns addressed. Encouraged Patient to call clinic with any questions or concerns.     Patient Instructions   See attached patient care instructions.      The patient indicates understanding of these issues and agrees to the plan.  The patient is asked to return if sx's persist or worsen.

## 2024-03-13 RX ORDER — OMEPRAZOLE 40 MG/1
40 CAPSULE, DELAYED RELEASE ORAL
Qty: 30 CAPSULE | Refills: 0 | Status: SHIPPED | OUTPATIENT
Start: 2024-03-13

## 2024-03-13 RX ORDER — OMEPRAZOLE 40 MG/1
40 CAPSULE, DELAYED RELEASE ORAL
Qty: 90 CAPSULE | Refills: 3 | Status: SHIPPED | OUTPATIENT
Start: 2024-03-13 | End: 2024-03-13

## 2024-03-13 NOTE — TELEPHONE ENCOUNTER
Refill passed per protocol.    Fliplingo message sent to patient to schedule an office visit with PCP.   Will also make a phone attempt.    Requested Prescriptions   Pending Prescriptions Disp Refills    lisinopril 20 MG Oral Tab [Pharmacy Med Name: LISINOPRIL 20MG TABLETS] 30 tablet 0     Sig: Take 1 tablet (20 mg total) by mouth daily. Appointment needed for further refills.       Hypertension Medications Protocol Failed - 3/13/2024  9:24 AM        Failed - CMP or BMP in past 12 months        Failed - Last BP reading less than 140/90     BP Readings from Last 1 Encounters:   02/20/24 154/86               Failed - EGFRCR or GFRNAA > 50     GFR Evaluation            Passed - In person appointment or virtual visit in the past 12 mos or appointment in next 3 mos     Recent Outpatient Visits              3 weeks ago Right acute serous otitis media, recurrence not specified    [facility]    Office Visit    1 month ago Rib pain on left side    [facility], Beverly Slater APRN    Office Visit    6 months ago COVID-19    [facility]    [facility]    11 months ago Personal history of colonic polyps    [facility] [place]    Nurse Only    11 months ago Acute maxillary sinusitis, recurrence not specified    [facility]    Office Visit                        Omeprazole 40 MG Oral Capsule Delayed Release 90 capsule 3     Sig: Take 1 capsule (40 mg total) by mouth before breakfast. Appointment needed for further refills.       Gastrointestional Medication Protocol Passed - 3/13/2024  9:24 AM        Passed - In person appointment or virtual visit in the past 12 mos or appointment in next 3 mos     Recent Outpatient Visits              3  weeks ago Right acute serous otitis media, recurrence not specified    [facility] DEAN Osullivan    Office Visit    1 month ago Rib pain on left side    [facility], Beverly Slater, APRN    Office Visit    6 months ago [address] Les Moyer DO    Telemedicine    11 months ago Personal history of colonic polyps    [place], [place]    Nurse Only    11 months ago Acute maxillary sinusitis, recurrence not specified    [facility] DEAN Os[facility]    Office Visit                       Signed Prescriptions Disp Refills    Omeprazole 40 MG Oral Capsule Delayed Release 90 capsule 3     Sig: Take 1 capsule (40 mg total) by mouth before breakfast. Appointment needed for further refills.       Gastrointestional Medication Protocol Passed - 3/11/2024  5:03 PM        Passed - In person appointment or virtual visit in the past 12 mos or appointment in next 3 mos     Recent Outpatient Visits              3 weeks ago Right acute serous otitis media, recurrence not specified    [facility], [facility] Mathew Barton Jr., DEAN Osullivan    Office Visit    1 month ago Rib pain on left side    [facility], Beverly Slater, APRN    Office Visit    6 months ago [address] Moyer DO    Telemedicine    11 months ago [facility] of [facility]ps    [facility]    [facility]    11 months ago Acute maxillary sinusitis, recurrence not specified    [facility]    Office Visit                             Recent Outpatient  Visits              3 weeks ago Right acute serous otitis media, recurrence not specified    [facility], [facility], [facility], [place] Oralia Pizarro, DEAN Osullivan    Office Visit    1 month ago Rib pain on left side    [facility], [address], Beverly Slater APRN    Office Visit    6 months ago COVID-19    [facility], [address], [place] Les Moyer,     Telemedicine    11 months ago Personal history of colonic polyps    [facility], [place]    Nurse Only    11 months ago Acute maxillary sinusitis, recurrence not specified    [facility], [facility], [facility], [place] Oralia Pizarro, DEAN Osullivan    Office Visit

## 2024-03-13 NOTE — TELEPHONE ENCOUNTER
Please review, protocol failed/No protocol.    [employer] message sent to patient to schedule an office visit with PCP.   Will also make a phone attempt.    Requested Prescriptions   Pending Prescriptions Disp Refills    lisinopril 20 MG Oral Tab [Pharmacy Med Name: LISINOPRIL 20MG TABLETS] 30 tablet 0     Sig: Take 1 tablet (20 mg total) by mouth daily. Appointment needed for further refills.       Hypertension Medications Protocol Failed - 3/13/2024  9:24 AM        Failed - CMP or BMP in past 12 months        Failed - Last BP reading less than 140/90     BP Readings from Last 1 Encounters:   02/20/24 154/86               Failed - EGFRCR or GFRNAA > 50     GFR Evaluation            Passed - In person appointment or virtual visit in the past 12 mos or appointment in next 3 mos     Recent Outpatient Visits              3 weeks ago Right acute serous otitis media, recurrence not specified    [facility]    Office Visit    1 month ago Rib pain on left side    [facility], Beverly Slater APRN    Office Visit    6 months ago COVID-19    [facility] [place]    Telemedicine    11 months ago Personal history of colonic polyps    [facility]    Nurse Only    11 months ago Acute maxillary sinusitis, recurrence not specified    [facility]    Office Visit                       Signed Prescriptions Disp Refills    Omeprazole 40 MG Oral Capsule Delayed Release 90 capsule 3     Sig: Take 1 capsule (40 mg total) by mouth before breakfast. Appointment needed for further refills.       Gastrointestional Medication Protocol Passed - 3/11/2024  5:03 PM        Passed - In person appointment or virtual visit in the past 12 mos or appointment in next  3 mos     Recent Outpatient Visits              3 weeks ago Right acute serous otitis media, recurrence not specified    [facility] DEAN Osullivan    Office Visit    1 month ago Rib pain on left side    [facility]    Office Visit    6 months ago [address]    Telemedicine    11 months ago Personal history of colonic polyps    [facility] [place]    Nurse Only    11 months ago Acute maxillary sinusitis, recurrence not specified    [facility] DEAN Osullivan    Office Visit                        Omeprazole 40 MG Oral Capsule Delayed Release 30 capsule 0     Sig: Take 1 capsule (40 mg total) by mouth before breakfast. Appointment needed for further refills.       Gastrointestional Medication Protocol Passed - 3/13/2024  9:24 AM        Passed - In person appointment or virtual visit in the past 12 mos or appointment in next 3 mos     Recent Outpatient Visits              3 weeks ago Right acute serous otitis media, recurrence not specified    [facility] Mathew Barton Jr., DEAN Osullivan    Office Visit    1 month ago Rib pain on left side    [facility]    Office Visit    6 months ago [address]    Telemedicine    11 months ago [facility] of [facility]ps    [facility]    [facility]    11 months ago Acute maxillary sinusitis, recurrence not specified    [facility]    Office Visit                             Recent  Outpatient Visits              3 weeks ago Right acute serous otitis media, recurrence not specified    [facility] [place], [facility] Oralia Pizarro, DEAN Osullivan    Office Visit    1 month ago Rib pain on left side    [facility] [place], [address], Beverly Slater APRN    Office Visit    6 months ago COVID-19    [facility] [place], [address], [place] Les Moyer,     Telemedicine    11 months ago Personal history of colonic polyps    [facility] [place]    Nurse Only    11 months ago Acute maxillary sinusitis, recurrence not specified    [facility] [place] Oralia Pizarro, DEAN Osullivan    Office Visit

## 2024-03-13 NOTE — TELEPHONE ENCOUNTER
Patient scheduled appointment. Medication pended for your review and approval.      Future Appointments   Date Time Provider Department Center   4/4/2024 12:15 PM Les Moyer DO [facility]

## 2024-03-15 RX ORDER — LISINOPRIL 20 MG/1
20 TABLET ORAL DAILY
Qty: 90 TABLET | Refills: 0 | OUTPATIENT
Start: 2024-03-15

## 2024-04-04 ENCOUNTER — OFFICE VISIT (OUTPATIENT)
Dept: FAMILY MEDICINE CLINIC | Facility: CLINIC | Age: 54
End: 2024-04-04
Payer: COMMERCIAL

## 2024-04-04 VITALS
HEIGHT: 71 IN | DIASTOLIC BLOOD PRESSURE: 85 MMHG | SYSTOLIC BLOOD PRESSURE: 138 MMHG | BODY MASS INDEX: 31.75 KG/M2 | HEART RATE: 60 BPM | WEIGHT: 226.81 LBS

## 2024-04-04 DIAGNOSIS — J43.2 CENTRILOBULAR EMPHYSEMA (HCC): ICD-10-CM

## 2024-04-04 DIAGNOSIS — F41.9 ANXIETY: ICD-10-CM

## 2024-04-04 DIAGNOSIS — E78.00 HYPERCHOLESTEROLEMIA: ICD-10-CM

## 2024-04-04 DIAGNOSIS — I10 ESSENTIAL HYPERTENSION: ICD-10-CM

## 2024-04-04 DIAGNOSIS — M54.12 CERVICAL RADICULOPATHY: ICD-10-CM

## 2024-04-04 DIAGNOSIS — Z00.00 ROUTINE GENERAL MEDICAL EXAMINATION AT A HEALTH CARE FACILITY: Primary | ICD-10-CM

## 2024-04-04 DIAGNOSIS — Z72.0 TOBACCO USE: ICD-10-CM

## 2024-04-04 RX ORDER — SILDENAFIL 50 MG/1
50 TABLET, FILM COATED ORAL
Qty: 15 TABLET | Refills: 5 | Status: SHIPPED | OUTPATIENT
Start: 2024-04-04

## 2024-04-04 RX ORDER — OMEPRAZOLE 40 MG/1
40 CAPSULE, DELAYED RELEASE ORAL
Qty: 30 CAPSULE | Refills: 11 | Status: SHIPPED | OUTPATIENT
Start: 2024-04-04

## 2024-04-04 RX ORDER — ALBUTEROL SULFATE 90 UG/1
2 AEROSOL, METERED RESPIRATORY (INHALATION) EVERY 6 HOURS PRN
Qty: 1 EACH | Refills: 3 | Status: SHIPPED | OUTPATIENT
Start: 2024-04-04

## 2024-04-04 RX ORDER — FLUTICASONE PROPIONATE AND SALMETEROL 250; 50 UG/1; UG/1
1 POWDER RESPIRATORY (INHALATION) EVERY 12 HOURS SCHEDULED
Qty: 1 EACH | Refills: 3 | Status: SHIPPED | OUTPATIENT
Start: 2024-04-04

## 2024-04-04 RX ORDER — LISINOPRIL 30 MG/1
30 TABLET ORAL DAILY
Qty: 30 TABLET | Refills: 11 | Status: SHIPPED | OUTPATIENT
Start: 2024-04-04

## 2024-04-04 RX ORDER — FENOFIBRATE 150 MG/1
1 CAPSULE ORAL DAILY
Qty: 30 CAPSULE | Refills: 11 | Status: SHIPPED | OUTPATIENT
Start: 2024-04-04

## 2024-04-04 NOTE — PROGRESS NOTES
Subjective:   Maicol Sellers is a 53 year old male who presents for Routine Physical       History/Other:    Chief Complaint Reviewed and Verified  No Further Nursing Notes to   Review  Tobacco Reviewed  Allergies Reviewed  Medications Reviewed    Problem List Reviewed  Medical History Reviewed  Surgical History   Reviewed  Family History Reviewed  Social History Reviewed         Tobacco:  He currently smokes tobacco.  Social History    Tobacco Use      Smoking status: Every Day        Packs/day: 1.00        Years: 30.00        Additional pack years: 0.00        Total pack years: 30.00        Types: Cigarettes        Last attempt to quit: 2017        Years since quittin.3      Smokeless tobacco: Never     Tobacco Cessation Documentation (Smoking and Smokeless included):  I had an in depth therapy session with Maicol Sellers about his tobacco use risks and options using the USPSTF's Five A's approach:    Ask: Maicol is using tobacco products.  Assess: We asked about/assessed behavioral health risk and factors affecting choice of behavior change goals/methods.  Specifically I asked about readiness to quit tobacco.  Advise: We gave clear, specific, and personalized behavior change advice, including information about personal health harms and benefits. Specifically, he was told that Quitting tobacco is one of the best things he can do for his health. I strongly encouraged him to quit.      Agree: We collaboratively selected appropriate treatment goals and methods based on the patient’s interest in and willingness to change the behavior.   Assist: We used behavior change techniques (self-help and/or counseling), to aid Maicol in achieving agreed-upon goals by acquiring the skills, confidence, and social/environmental supports for behavior change, supplemented with adjunctive medical treatments when appropriate. Additionally, national quitting tobacco aides were discussed.   Arrange: Follow up at next  visit- see specific follow up below.    Time Counseled: 2 minutes       Current Outpatient Medications   Medication Sig Dispense Refill    albuterol (PROAIR HFA) 108 (90 Base) MCG/ACT Inhalation Aero Soln Inhale 2 puffs into the lungs every 6 (six) hours as needed for Wheezing. 1 each 3    Fenofibrate 150 MG Oral Cap Take 1 capsule by mouth daily. 30 capsule 11    Omeprazole 40 MG Oral Capsule Delayed Release Take 1 capsule (40 mg total) by mouth before breakfast. Appointment needed for further refills. 30 capsule 11    Sildenafil Citrate 50 MG Oral Tab Take 1 tablet (50 mg total) by mouth daily as needed for Erectile Dysfunction. 15 tablet 5    lisinopril 30 MG Oral Tab Take 1 tablet (30 mg total) by mouth daily. 30 tablet 11    fluticasone-salmeterol (WIXELA INHUB) 250-50 MCG/ACT Inhalation Aerosol Powder, Breath Activated Inhale 1 puff into the lungs every 12 (twelve) hours. 1 each 3    ALPRAZolam 0.5 MG Oral Tab Take 1 tablet (0.5 mg total) by mouth 2 (two) times daily as needed. 30 tablet 0    lisinopril 20 MG Oral Tab Take 1 tablet (20 mg total) by mouth daily. 90 tablet 3         Review of Systems:  Review of Systems   Constitutional: Negative.    HENT: Negative.     Eyes: Negative.    Respiratory:  Positive for shortness of breath.    Cardiovascular: Negative.    Gastrointestinal: Negative.    Endocrine: Negative for polydipsia, polyphagia and polyuria.   Genitourinary: Negative.    Musculoskeletal: Negative.    Skin: Negative.         No mole changes   Allergic/Immunologic: Negative for environmental allergies.   Neurological:  Negative for dizziness, weakness, numbness and headaches.   Hematological: Negative.    Psychiatric/Behavioral:  Negative for sleep disturbance.         No anxiety or depressed feelings         Objective:   /85   Pulse 60   Ht 5' 11\" (1.803 m)   Wt 226 lb 12.8 oz (102.9 kg)   BMI 31.63 kg/m²  Estimated body mass index is 31.63 kg/m² as calculated from the following:     Height as of this encounter: 5' 11\" (1.803 m).    Weight as of this encounter: 226 lb 12.8 oz (102.9 kg).  Physical Exam  Vitals reviewed.   Constitutional:       Appearance: Normal appearance. He is well-developed.   HENT:      Head: Normocephalic.      Right Ear: Tympanic membrane, ear canal and external ear normal.      Left Ear: Tympanic membrane, ear canal and external ear normal.      Nose: Nose normal.   Eyes:      General: Lids are normal.      Conjunctiva/sclera: Conjunctivae normal.      Pupils: Pupils are equal, round, and reactive to light.      Funduscopic exam:     Right eye: No hemorrhage or papilledema.         Left eye: No hemorrhage or papilledema.   Neck:      Vascular: Normal carotid pulses. No JVD.      Trachea: Trachea normal.   Cardiovascular:      Rate and Rhythm: Regular rhythm.      Pulses:           Carotid pulses are 2+ on the right side and 2+ on the left side.       Radial pulses are 2+ on the right side and 2+ on the left side.      Heart sounds: Normal heart sounds.   Pulmonary:      Breath sounds: Normal breath sounds.   Abdominal:      Tenderness: There is no abdominal tenderness.   Musculoskeletal:      Cervical back: Normal, normal range of motion and neck supple.      Thoracic back: Normal.      Lumbar back: Normal.   Lymphadenopathy:      Cervical: No cervical adenopathy.   Skin:     Comments: No suspicious lesions waist up exam   Neurological:      General: No focal deficit present.      Mental Status: He is alert and oriented to person, place, and time.      Sensory: No sensory deficit.      Deep Tendon Reflexes: Reflexes are normal and symmetric.   Psychiatric:         Mood and Affect: Mood normal. Mood is not anxious or depressed.           Assessment & Plan:   1. Routine general medical examination at a health care facility (Primary)  -     CBC With Differential With Platelet; Future; Expected date: 04/04/2024  -     Comp Metabolic Panel (14); Future; Expected date:  04/04/2024  -     Lipid Panel; Future; Expected date: 04/04/2024  -     PSA Total, Screen; Future; Expected date: 04/04/2024  -     Urinalysis, Routine; Future; Expected date: 04/04/2024    2. Essential hypertension  CPM    3. Hypercholesterolemia  CPM    4. Tobacco use  -     CT LUNG LD SCREENING(CPT=71271); Future; Expected date: 04/04/2024  Encouraged to quit    5. Anxiety  Stable. Prn med usage.     6. Cervical radiculopathy  Stable.  Recent injury in fall at home but no neuro symptoms.       7. Centrilobular emphysema (HCC)  Other orders  -     Albuterol Sulfate HFA; Inhale 2 puffs into the lungs every 6 (six) hours as needed for Wheezing.  Dispense: 1 each; Refill: 3  -     Fenofibrate; Take 1 capsule by mouth daily.  Dispense: 30 capsule; Refill: 11  -     Omeprazole; Take 1 capsule (40 mg total) by mouth before breakfast. Appointment needed for further refills.  Dispense: 30 capsule; Refill: 11  -     Sildenafil Citrate; Take 1 tablet (50 mg total) by mouth daily as needed for Erectile Dysfunction.  Dispense: 15 tablet; Refill: 5  -     Lisinopril; Take 1 tablet (30 mg total) by mouth daily.  Dispense: 30 tablet; Refill: 11  -     Fluticasone-Salmeterol; Inhale 1 puff into the lungs every 12 (twelve) hours.  Dispense: 1 each; Refill: 3  Not on inhaler.  Notices breathing heavy at time and easily winded.  Go on inhaler and follow up.        Return in about 3 months (around 7/4/2024).    Les Moyer DO, 4/4/2024, 5:41 PM

## 2024-04-11 ENCOUNTER — LAB ENCOUNTER (OUTPATIENT)
Dept: LAB | Age: 54
End: 2024-04-11
Attending: FAMILY MEDICINE
Payer: COMMERCIAL

## 2024-04-11 DIAGNOSIS — Z00.00 ROUTINE GENERAL MEDICAL EXAMINATION AT A HEALTH CARE FACILITY: ICD-10-CM

## 2024-04-11 LAB
ALBUMIN SERPL-MCNC: 4.8 G/DL (ref 3.2–4.8)
ALBUMIN/GLOB SERPL: 1.8 {RATIO} (ref 1–2)
ALP LIVER SERPL-CCNC: 45 U/L
ALT SERPL-CCNC: 16 U/L
ANION GAP SERPL CALC-SCNC: 4 MMOL/L (ref 0–18)
AST SERPL-CCNC: 18 U/L (ref ?–34)
BASOPHILS # BLD AUTO: 0.04 X10(3) UL (ref 0–0.2)
BASOPHILS NFR BLD AUTO: 0.6 %
BILIRUB SERPL-MCNC: 0.5 MG/DL (ref 0.3–1.2)
BILIRUB UR QL: NEGATIVE
BUN BLD-MCNC: 13 MG/DL (ref 9–23)
BUN/CREAT SERPL: 12.7 (ref 10–20)
CALCIUM BLD-MCNC: 10.1 MG/DL (ref 8.7–10.4)
CHLORIDE SERPL-SCNC: 109 MMOL/L (ref 98–112)
CHOLEST SERPL-MCNC: 173 MG/DL (ref ?–200)
CLARITY UR: CLEAR
CO2 SERPL-SCNC: 26 MMOL/L (ref 21–32)
COMPLEXED PSA SERPL-MCNC: 0.34 NG/ML (ref ?–4)
CREAT BLD-MCNC: 1.02 MG/DL
DEPRECATED RDW RBC AUTO: 39.3 FL (ref 35.1–46.3)
EGFRCR SERPLBLD CKD-EPI 2021: 87 ML/MIN/1.73M2 (ref 60–?)
EOSINOPHIL # BLD AUTO: 0.13 X10(3) UL (ref 0–0.7)
EOSINOPHIL NFR BLD AUTO: 1.9 %
ERYTHROCYTE [DISTWIDTH] IN BLOOD BY AUTOMATED COUNT: 12.9 % (ref 11–15)
FASTING PATIENT LIPID ANSWER: NO
FASTING STATUS PATIENT QL REPORTED: NO
GLOBULIN PLAS-MCNC: 2.6 G/DL (ref 2.8–4.4)
GLUCOSE BLD-MCNC: 109 MG/DL (ref 70–99)
GLUCOSE UR-MCNC: NORMAL MG/DL
HCT VFR BLD AUTO: 41.9 %
HDLC SERPL-MCNC: 40 MG/DL (ref 40–59)
HGB BLD-MCNC: 14.8 G/DL
HGB UR QL STRIP.AUTO: NEGATIVE
IMM GRANULOCYTES # BLD AUTO: 0.03 X10(3) UL (ref 0–1)
IMM GRANULOCYTES NFR BLD: 0.4 %
KETONES UR-MCNC: NEGATIVE MG/DL
LDLC SERPL CALC-MCNC: 105 MG/DL (ref ?–100)
LEUKOCYTE ESTERASE UR QL STRIP.AUTO: NEGATIVE
LYMPHOCYTES # BLD AUTO: 2.27 X10(3) UL (ref 1–4)
LYMPHOCYTES NFR BLD AUTO: 32.7 %
MCH RBC QN AUTO: 29.7 PG (ref 26–34)
MCHC RBC AUTO-ENTMCNC: 35.3 G/DL (ref 31–37)
MCV RBC AUTO: 84 FL
MONOCYTES # BLD AUTO: 0.43 X10(3) UL (ref 0.1–1)
MONOCYTES NFR BLD AUTO: 6.2 %
NEUTROPHILS # BLD AUTO: 4.04 X10 (3) UL (ref 1.5–7.7)
NEUTROPHILS # BLD AUTO: 4.04 X10(3) UL (ref 1.5–7.7)
NEUTROPHILS NFR BLD AUTO: 58.2 %
NITRITE UR QL STRIP.AUTO: NEGATIVE
NONHDLC SERPL-MCNC: 133 MG/DL (ref ?–130)
OSMOLALITY SERPL CALC.SUM OF ELEC: 289 MOSM/KG (ref 275–295)
PH UR: 6 [PH] (ref 5–8)
PLATELET # BLD AUTO: 234 10(3)UL (ref 150–450)
POTASSIUM SERPL-SCNC: 4.3 MMOL/L (ref 3.5–5.1)
PROT SERPL-MCNC: 7.4 G/DL (ref 5.7–8.2)
PROT UR-MCNC: NEGATIVE MG/DL
RBC # BLD AUTO: 4.99 X10(6)UL
SODIUM SERPL-SCNC: 139 MMOL/L (ref 136–145)
SP GR UR STRIP: 1.02 (ref 1–1.03)
TRIGL SERPL-MCNC: 157 MG/DL (ref 30–149)
UROBILINOGEN UR STRIP-ACNC: NORMAL
VLDLC SERPL CALC-MCNC: 27 MG/DL (ref 0–30)
WBC # BLD AUTO: 6.9 X10(3) UL (ref 4–11)

## 2024-04-11 PROCEDURE — 80061 LIPID PANEL: CPT

## 2024-04-11 PROCEDURE — 36415 COLL VENOUS BLD VENIPUNCTURE: CPT

## 2024-04-11 PROCEDURE — 80053 COMPREHEN METABOLIC PANEL: CPT

## 2024-04-11 PROCEDURE — 85025 COMPLETE CBC W/AUTO DIFF WBC: CPT

## 2024-04-11 PROCEDURE — 81003 URINALYSIS AUTO W/O SCOPE: CPT

## 2024-04-18 ENCOUNTER — HOSPITAL ENCOUNTER (OUTPATIENT)
Dept: CT IMAGING | Age: 54
Discharge: HOME | End: 2024-04-18
Attending: FAMILY MEDICINE
Payer: COMMERCIAL

## 2024-04-18 DIAGNOSIS — Z72.0 TOBACCO USE: ICD-10-CM

## 2024-04-18 PROCEDURE — 71271 CT THORAX LUNG CANCER SCR C-: CPT | Performed by: FAMILY MEDICINE

## 2024-05-02 ENCOUNTER — OFFICE VISIT (OUTPATIENT)
Dept: FAMILY MEDICINE CLINIC | Facility: CLINIC | Age: 54
End: 2024-05-02
Payer: COMMERCIAL

## 2024-05-02 VITALS
HEART RATE: 71 BPM | OXYGEN SATURATION: 97 % | WEIGHT: 228 LBS | BODY MASS INDEX: 31.92 KG/M2 | SYSTOLIC BLOOD PRESSURE: 135 MMHG | DIASTOLIC BLOOD PRESSURE: 85 MMHG | HEIGHT: 71 IN

## 2024-05-02 DIAGNOSIS — J01.00 ACUTE MAXILLARY SINUSITIS, RECURRENCE NOT SPECIFIED: Primary | ICD-10-CM

## 2024-05-02 PROCEDURE — 99213 OFFICE O/P EST LOW 20 MIN: CPT | Performed by: FAMILY MEDICINE

## 2024-05-02 PROCEDURE — 3008F BODY MASS INDEX DOCD: CPT | Performed by: FAMILY MEDICINE

## 2024-05-02 PROCEDURE — 3075F SYST BP GE 130 - 139MM HG: CPT | Performed by: FAMILY MEDICINE

## 2024-05-02 PROCEDURE — 3079F DIAST BP 80-89 MM HG: CPT | Performed by: FAMILY MEDICINE

## 2024-05-02 RX ORDER — AMOXICILLIN AND CLAVULANATE POTASSIUM 875; 125 MG/1; MG/1
1 TABLET, FILM COATED ORAL 2 TIMES DAILY
Qty: 14 TABLET | Refills: 0 | Status: SHIPPED | OUTPATIENT
Start: 2024-05-02 | End: 2024-05-09

## 2024-05-02 NOTE — PROGRESS NOTES
Subjective:   Maicol Sellers is a 53 year old male who presents for Cough (Started 3 days ago, ear ache and chest pressure )       History/Other:    Chief Complaint Reviewed and Verified  Nursing Notes Reviewed and   Verified  Tobacco Reviewed  Allergies Reviewed  Medications Reviewed    Medical History Reviewed  Surgical History Reviewed  Family History   Reviewed  Social History Reviewed         Tobacco:  He currently smokes tobacco.  Social History     Tobacco Use   Smoking Status Every Day    Current packs/day: 0.00    Average packs/day: 1 pack/day for 30.0 years (30.0 ttl pk-yrs)    Types: Cigarettes    Start date: 1987    Last attempt to quit: 2017    Years since quittin.4   Smokeless Tobacco Never      Tobacco Cessation Documentation (Smoking and Smokeless included):  I had an in depth therapy session with Maicol Sellers about his tobacco use risks and options using the USPSTF's Five A's approach:    Ask: Maicol is using tobacco products.  Assess: We asked about/assessed behavioral health risk and factors affecting choice of behavior change goals/methods.  Specifically I asked about readiness to quit tobacco.  Advise: We gave clear, specific, and personalized behavior change advice, including information about personal health harms and benefits. Specifically, he was told that Quitting tobacco is one of the best things he can do for his health. I strongly encouraged him to quit.      Agree: We collaboratively selected appropriate treatment goals and methods based on the patient’s interest in and willingness to change the behavior.   Assist: We used behavior change techniques (self-help and/or counseling), to aid Maicol in achieving agreed-upon goals by acquiring the skills, confidence, and social/environmental supports for behavior change, supplemented with adjunctive medical treatments when appropriate. Additionally, national quitting tobacco aides were discussed.   Arrange: Follow up  at next visit- see specific follow up below.    Time Counseled: <1 minutes       Current Outpatient Medications   Medication Sig Dispense Refill    amoxicillin clavulanate 875-125 MG Oral Tab Take 1 tablet by mouth 2 (two) times daily for 7 days. 14 tablet 0    albuterol (PROAIR HFA) 108 (90 Base) MCG/ACT Inhalation Aero Soln Inhale 2 puffs into the lungs every 6 (six) hours as needed for Wheezing. 1 each 3    Fenofibrate 150 MG Oral Cap Take 1 capsule by mouth daily. 30 capsule 11    Omeprazole 40 MG Oral Capsule Delayed Release Take 1 capsule (40 mg total) by mouth before breakfast. Appointment needed for further refills. 30 capsule 11    Sildenafil Citrate 50 MG Oral Tab Take 1 tablet (50 mg total) by mouth daily as needed for Erectile Dysfunction. 15 tablet 5    lisinopril 30 MG Oral Tab Take 1 tablet (30 mg total) by mouth daily. 30 tablet 11    fluticasone-salmeterol (WIXELA INHUB) 250-50 MCG/ACT Inhalation Aerosol Powder, Breath Activated Inhale 1 puff into the lungs every 12 (twelve) hours. 1 each 3    ALPRAZolam 0.5 MG Oral Tab Take 1 tablet (0.5 mg total) by mouth 2 (two) times daily as needed. 30 tablet 0         Review of Systems:  Review of Systems   Constitutional:  Positive for fatigue.   HENT:  Positive for congestion, ear pain, postnasal drip, sinus pressure and sinus pain.    Respiratory: Negative.           Objective:   /85   Pulse 71   Ht 5' 11\" (1.803 m)   Wt 228 lb (103.4 kg)   SpO2 97%   BMI 31.80 kg/m²  Estimated body mass index is 31.8 kg/m² as calculated from the following:    Height as of this encounter: 5' 11\" (1.803 m).    Weight as of this encounter: 228 lb (103.4 kg).  Physical Exam  Vitals reviewed.   HENT:      Right Ear: Tympanic membrane is injected.      Left Ear: Tympanic membrane is injected.      Nose: Congestion present.   Pulmonary:      Breath sounds: Normal breath sounds.   Neurological:      Mental Status: He is alert.           Assessment & Plan:   1. Acute  maxillary sinusitis, recurrence not specified (Primary)  Other orders  -     Amoxicillin-Pot Clavulanate; Take 1 tablet by mouth 2 (two) times daily for 7 days.  Dispense: 14 tablet; Refill: 0    Recommend abx due to his strong hist of sinus infections and ear surgery.  Two family members at home curently being treated    No follow-ups on file.    Les Moyer DO, 5/2/2024, 3:24 PM

## 2024-05-03 RX ORDER — SILDENAFIL 50 MG/1
50 TABLET, FILM COATED ORAL
Qty: 90 TABLET | Refills: 0 | OUTPATIENT
Start: 2024-05-03

## 2024-06-23 RX ORDER — OMEPRAZOLE 40 MG/1
40 CAPSULE, DELAYED RELEASE ORAL
Qty: 90 CAPSULE | Refills: 0 | Status: SHIPPED | OUTPATIENT
Start: 2024-06-23

## 2024-06-23 NOTE — TELEPHONE ENCOUNTER
Refill passed per [facility] protocol.     Requested Prescriptions   Pending Prescriptions Disp Refills    OMEPRAZOLE 40 MG Oral Capsule Delayed Release [Pharmacy Med Name: OMEPRAZOLE 40MG CAPSULES] 90 capsule 0     Sig: TAKE 1 CAPSULE(40 MG) BY MOUTH BEFORE BREAKFAST       Gastrointestional Medication Protocol Passed - 6/20/2024 11:35 AM        Passed - In person appointment or virtual visit in the past 12 mos or appointment in next 3 mos     Recent Outpatient Visits              1 month ago Acute maxillary sinusitis, recurrence not specified    [facility]    Office Visit    2 months ago Routine general medical examination at a health care facility    [facility],     Office Visit    4 months ago Right acute serous otitis media, recurrence not specified    [facility]AN    Office Visit    4 months ago Rib pain on left side    [facility], Beverly Slater APRN    Office Visit    10 months ago COVID-19    [facility]    Telemedicine          Future Appointments         Provider Department Appt Notes    In 2 weeks [facility] [address] [place] - [place]                           Recent Outpatient Visits              1 month ago Acute maxillary sinusitis, recurrence not specified    [facility]    Office Visit    2 months ago Routine general medical examination at a health care facility    [facility]    Office Visit    4 months ago Right acute serous otitis media, recurrence not specified    [facility]  DEAN Os[facility]    Office Visit    4 months ago Rib pain on left side    [facility], [address], Beverly Slater APRN    Office Visit    10 months ago COVID-19    [facility], [address], [place] Les Moyer DO    Telemedicine             Future Appointments         Provider Department Appt Notes    In 2 weeks [facility] CT [address] [facility] [place] - [place]

## 2024-07-11 ENCOUNTER — HOSPITAL ENCOUNTER (OUTPATIENT)
Dept: CT IMAGING | Age: 54
Discharge: HOME | End: 2024-07-11
Attending: NURSE PRACTITIONER

## 2024-07-11 DIAGNOSIS — Z72.0 TOBACCO USE: ICD-10-CM

## 2024-07-11 DIAGNOSIS — E78.00 HYPERCHOLESTEROLEMIA: ICD-10-CM

## 2024-07-11 DIAGNOSIS — I10 ESSENTIAL HYPERTENSION: ICD-10-CM

## 2024-07-11 DIAGNOSIS — Z13.9 ENCOUNTER FOR SCREENING: ICD-10-CM

## 2024-07-14 ENCOUNTER — APPOINTMENT (OUTPATIENT)
Dept: CT IMAGING | Facility: HOSPITAL | Age: 54
End: 2024-07-14
Attending: EMERGENCY MEDICINE
Payer: COMMERCIAL

## 2024-07-14 ENCOUNTER — HOSPITAL ENCOUNTER (EMERGENCY)
Facility: HOSPITAL | Age: 54
Discharge: HOME | End: 2024-07-14
Attending: EMERGENCY MEDICINE
Payer: COMMERCIAL

## 2024-07-14 VITALS
WEIGHT: 220 LBS | RESPIRATION RATE: 19 BRPM | HEART RATE: 56 BPM | DIASTOLIC BLOOD PRESSURE: 83 MMHG | OXYGEN SATURATION: 95 % | TEMPERATURE: 97 F | SYSTOLIC BLOOD PRESSURE: 133 MMHG | BODY MASS INDEX: 31 KG/M2

## 2024-07-14 DIAGNOSIS — A08.4 VIRAL ENTERITIS: Primary | ICD-10-CM

## 2024-07-14 LAB
ALBUMIN SERPL-MCNC: 4.5 G/DL (ref 3.2–4.8)
ALBUMIN/GLOB SERPL: 2 {RATIO} (ref 1–2)
ALP LIVER SERPL-CCNC: 43 U/L
ALT SERPL-CCNC: 16 U/L
ANION GAP SERPL CALC-SCNC: 6 MMOL/L (ref 0–18)
AST SERPL-CCNC: 19 U/L (ref ?–34)
BASOPHILS # BLD AUTO: 0.04 X10(3) UL (ref 0–0.2)
BASOPHILS NFR BLD AUTO: 0.2 %
BILIRUB SERPL-MCNC: 0.4 MG/DL (ref 0.3–1.2)
BILIRUB UR QL: NEGATIVE
BUN BLD-MCNC: 18 MG/DL (ref 9–23)
BUN/CREAT SERPL: 19.6 (ref 10–20)
CALCIUM BLD-MCNC: 9.4 MG/DL (ref 8.7–10.4)
CHLORIDE SERPL-SCNC: 111 MMOL/L (ref 98–112)
CLARITY UR: CLEAR
CO2 SERPL-SCNC: 24 MMOL/L (ref 21–32)
CREAT BLD-MCNC: 0.92 MG/DL
DEPRECATED RDW RBC AUTO: 38.5 FL (ref 35.1–46.3)
EGFRCR SERPLBLD CKD-EPI 2021: 99 ML/MIN/1.73M2 (ref 60–?)
EOSINOPHIL # BLD AUTO: 0.08 X10(3) UL (ref 0–0.7)
EOSINOPHIL NFR BLD AUTO: 0.4 %
ERYTHROCYTE [DISTWIDTH] IN BLOOD BY AUTOMATED COUNT: 12.5 % (ref 11–15)
GLOBULIN PLAS-MCNC: 2.2 G/DL (ref 2–3.5)
GLUCOSE BLD-MCNC: 128 MG/DL (ref 70–99)
GLUCOSE UR-MCNC: NORMAL MG/DL
HCT VFR BLD AUTO: 46.1 %
HGB BLD-MCNC: 16.4 G/DL
HGB UR QL STRIP.AUTO: NEGATIVE
IMM GRANULOCYTES # BLD AUTO: 0.17 X10(3) UL (ref 0–1)
IMM GRANULOCYTES NFR BLD: 0.9 %
KETONES UR-MCNC: NEGATIVE MG/DL
LEUKOCYTE ESTERASE UR QL STRIP.AUTO: NEGATIVE
LYMPHOCYTES # BLD AUTO: 1.63 X10(3) UL (ref 1–4)
LYMPHOCYTES NFR BLD AUTO: 9.1 %
MCH RBC QN AUTO: 30.3 PG (ref 26–34)
MCHC RBC AUTO-ENTMCNC: 35.6 G/DL (ref 31–37)
MCV RBC AUTO: 85.1 FL
MONOCYTES # BLD AUTO: 0.86 X10(3) UL (ref 0.1–1)
MONOCYTES NFR BLD AUTO: 4.8 %
NEUTROPHILS # BLD AUTO: 15.16 X10 (3) UL (ref 1.5–7.7)
NEUTROPHILS # BLD AUTO: 15.16 X10(3) UL (ref 1.5–7.7)
NEUTROPHILS NFR BLD AUTO: 84.6 %
NITRITE UR QL STRIP.AUTO: NEGATIVE
OSMOLALITY SERPL CALC.SUM OF ELEC: 296 MOSM/KG (ref 275–295)
PH UR: 5 [PH] (ref 5–8)
PLATELET # BLD AUTO: 226 10(3)UL (ref 150–450)
POTASSIUM SERPL-SCNC: 3.9 MMOL/L (ref 3.5–5.1)
PROT SERPL-MCNC: 6.7 G/DL (ref 5.7–8.2)
PROT UR-MCNC: NEGATIVE MG/DL
RBC # BLD AUTO: 5.42 X10(6)UL
SODIUM SERPL-SCNC: 141 MMOL/L (ref 136–145)
SP GR UR STRIP: 1.03 (ref 1–1.03)
TROPONIN I SERPL HS-MCNC: 5 NG/L
UROBILINOGEN UR STRIP-ACNC: NORMAL
WBC # BLD AUTO: 17.9 X10(3) UL (ref 4–11)

## 2024-07-14 PROCEDURE — 84484 ASSAY OF TROPONIN QUANT: CPT | Performed by: EMERGENCY MEDICINE

## 2024-07-14 PROCEDURE — 96361 HYDRATE IV INFUSION ADD-ON: CPT

## 2024-07-14 PROCEDURE — 99284 EMERGENCY DEPT VISIT MOD MDM: CPT

## 2024-07-14 PROCEDURE — 81003 URINALYSIS AUTO W/O SCOPE: CPT | Performed by: EMERGENCY MEDICINE

## 2024-07-14 PROCEDURE — 74177 CT ABD & PELVIS W/CONTRAST: CPT | Performed by: EMERGENCY MEDICINE

## 2024-07-14 PROCEDURE — 85025 COMPLETE CBC W/AUTO DIFF WBC: CPT | Performed by: EMERGENCY MEDICINE

## 2024-07-14 PROCEDURE — 99285 EMERGENCY DEPT VISIT HI MDM: CPT

## 2024-07-14 PROCEDURE — 96374 THER/PROPH/DIAG INJ IV PUSH: CPT

## 2024-07-14 PROCEDURE — 80053 COMPREHEN METABOLIC PANEL: CPT | Performed by: EMERGENCY MEDICINE

## 2024-07-14 RX ORDER — DICYCLOMINE HCL 20 MG
20 TABLET ORAL 4 TIMES DAILY PRN
Qty: 20 TABLET | Refills: 0 | Status: SHIPPED | OUTPATIENT
Start: 2024-07-14 | End: 2024-08-03

## 2024-07-14 RX ORDER — ONDANSETRON 4 MG/1
4 TABLET, ORALLY DISINTEGRATING ORAL EVERY 8 HOURS PRN
Qty: 6 TABLET | Refills: 0 | Status: SHIPPED | OUTPATIENT
Start: 2024-07-14

## 2024-07-14 RX ORDER — MORPHINE SULFATE 4 MG/ML
4 INJECTION, SOLUTION INTRAMUSCULAR; INTRAVENOUS ONCE
Status: COMPLETED | OUTPATIENT
Start: 2024-07-14 | End: 2024-07-14

## 2024-07-14 RX ORDER — TRAMADOL HYDROCHLORIDE 50 MG/1
50 TABLET ORAL EVERY 8 HOURS PRN
Qty: 10 TABLET | Refills: 0 | Status: SHIPPED | OUTPATIENT
Start: 2024-07-14

## 2024-07-14 NOTE — ED INITIAL ASSESSMENT (HPI)
Abd pain onset 1 am, worse at 6 am +n/v/d    Syncopal episode while making bowel movement.     Reports similar symptoms with diverticulitis flares.    Denies fever or chills.

## 2024-07-14 NOTE — ED PROVIDER NOTES
Patient Seen in: [facility] Emergency Department      History     Chief Complaint   Patient presents with    Abdomen/Flank Pain     Stated Complaint: abd pain +n/v    Subjective:   HPI    53-year-old male with listed history and a history of diverticulitis not feeling well yesterday with onset of left-sided abdominal pain this morning.  He reports a colonoscopy this year.  No fever.  Nausea and vomiting.  No bloody stool.    Objective:   Past Medical History:    Anxiety    Anxiety state    Back problem    lower back fusion 2 years ago    Colitis    COPD (chronic obstructive pulmonary disease) (HCC)    Diverticulosis of large intestine    Esophageal reflux    Essential hypertension    Headache    per [employer]:  \"Headaches\"    High blood pressure    High cholesterol    Hyperlipidemia    MRSA infection    per [employer]:  \"MRSA skin infections; Management:  I and D, right (10/2011)\"              Past Surgical History:   Procedure Laterality Date    Back surgery      Colonoscopy N/A 2017    Procedure: COLONOSCOPY;  Surgeon: Da Jacobs MD;  Location: [facility] ENDOSCOPY    Colonoscopy N/A 2023    Procedure: COLONOSCOPY;  Surgeon: Da Jacobs MD;  Location: [facility] ENDOSCOPY    Electrocardiogram, complete  2012    Scanned to Media Tab - Date of Service 2012    Myringotomy, laser-assisted      Other surgical history      cervical fusion     Spine surgery procedure unlisted      lumbar fusion    Unlisted proc, arthroscopy Right     per [employer]:  \"arthroscopy x3 necrotizing infection right knee\"                Social History     Socioeconomic History    Marital status:    Tobacco Use    Smoking status: Every Day     Current packs/day: 0.00     Average packs/day: 1 pack/day for 30.0 years (30.0 ttl pk-yrs)     Types: Cigarettes     Start date: 1987     Last attempt to quit: 2017     Years since quittin.6    Smokeless tobacco: Never   Vaping Use    Vaping status: Former    Substance and Sexual Activity    Alcohol use: Yes     Alcohol/week: 1.0 standard drink of alcohol     Types: 1 Cans of beer per week     Comment: 2 beers, weekly    Drug use: Yes     Types: Cannabis   Other Topics Concern    Caffeine Concern Yes     Comment: coffee, 3 cups daily    Exercise Yes     Comment: walking   Social History Narrative    The patient does not use an assistive device..      The patient does live in a home with stairs.              Review of Systems    Positive for stated Chief Complaint: Abdomen/Flank Pain    Other systems are as noted in HPI.  Constitutional and vital signs reviewed.      All other systems reviewed and negative except as noted above.    Physical Exam     ED Triage Vitals   BP 07/14/24 0954 117/73   Pulse 07/14/24 0954 66   Resp 07/14/24 0954 18   Temp 07/14/24 0954 97 °F (36.1 °C)   Temp src 07/14/24 0954 Temporal   SpO2 07/14/24 0954 94 %   O2 Device 07/14/24 1024 None (Room air)       Current Vitals:   Vital Signs  BP: 121/70  Pulse: 60  Resp: 16  Temp: 97 °F (36.1 °C)  Temp src: Temporal  MAP (mmHg): 86    Oxygen Therapy  SpO2: 95 %  O2 Device: None (Room air)            Physical Exam    Constitutional: Oriented to person, place, and time.  Appears well-developed. No distress.   Head: Normocephalic and atraumatic.   Eyes: Conjunctivae are normal. Pupils are equal, round, and reactive to light.   Cardiovascular: Normal rate, regular rhythm and intact distal pulses.    Pulmonary/Chest: Effort normal. No respiratory distress.   Abdominal: Soft.  Mild diffuse left-sided abdominal pain.  No guarding or peritoneal signs.  Musculoskeletal: Normal range of motion. No edema or tenderness.   Neurological: Alert and oriented to person, place, and time.   Skin: Skin is warm and dry.   Nursing note and vitals reviewed.    Differential diagnosis includes acute recurrent diverticulitis, colitis, constipation, UTI, enterocolitis.      ED Course     Labs Reviewed   COMP METABOLIC PANEL  (14) - Abnormal; Notable for the following components:       Result Value    Glucose 128 (*)     Calculated Osmolality 296 (*)     Alkaline Phosphatase 43 (*)     All other components within normal limits   CBC W/ DIFFERENTIAL - Abnormal; Notable for the following components:    WBC 17.9 (*)     Neutrophil Absolute Prelim 15.16 (*)     Neutrophil Absolute 15.16 (*)     All other components within normal limits   TROPONIN I HIGH SENSITIVITY - Normal   CBC WITH DIFFERENTIAL WITH PLATELET    Narrative:     The following orders were created for panel order CBC With Differential With Platelet.  Procedure                               Abnormality         Status                     ---------                               -----------         ------                     CBC W/ DIFFERENTIAL[[id number]]          Abnormal            Final result                 Please view results for these tests on the individual orders.   URINALYSIS WITH CULTURE REFLEX   RAINBOW DRAW BLUE             CT ABDOMEN+PELVIS(CONTRAST ONLY)(CPT=74177)    Result Date: 7/14/2024  PROCEDURE: CT ABDOMEN + PELVIS (CONTRAST ONLY) (CPT=74177)  COMPARISON: [facility], CT CHEST ABDOMEN (ALL CONTRAST ONLY) (CPT=71260/94550), 11/18/2017, 9:49 AM.  [facility], CT ABDOMEN + PELVIS (CONTRAST ONLY) (CPT=74177), 9/20/2017, 4:32 PM.  [facility], CT ABDOMEN  + PELVIS (CONTRAST ONLY) (CPT=74177), 3/03/2017, 10:57 AM.  INDICATIONS: abd pain +n/v  TECHNIQUE: CT images of the abdomen and pelvis were obtained with non-ionic intravenous contrast material.  Automated exposure control for dose reduction was used. Adjustment of the mA and/or kV was done based on the patient's size. Use of iterative reconstruction technique for dose reduction was used.  Dose information is transmitted to the ACR (American College of Radiology) NRDR (National Radiology Data Registry) which includes the Dose Index Registry.  FINDINGS:  LUNG BASES: No  focal consolidation. LIVER: No enlargement, atrophy, abnormal density, or significant focal lesion.  SPLEEN: No enlargement or focal lesion.  GALLBLADDER: No cholelithiasis. PANCREAS: No lesion, fluid collection, ductal dilatation, or atrophy.  ADRENALS: No defined mass or abnormal enlargement.  KIDNEYS: Enhance symmetrically without hydronephrosis. AORTA/VASCULAR:   Abdominal aorta is normal in caliber. ABDOMINAL NODES: No mass or adenopathy.  BOWEL/MESENTERY:  No dilated loops of bowel.  Long segment circumferential wall thickening of small bowel with mild surrounding fat stranding.  Small amount of ascites.  Unremarkable appendix.  No pneumatosis or pneumoperitoneum. ABDOMINAL WALL: Unremarkable. URINARY BLADDER: No focal wall thickening or calculus.  PELVIC NODES: No adenopathy.   PELVIC ORGANS: No visible mass.  Pelvic organs appropriate for patient age.  BONES:   Spondylosis lower thoracic and lumbar spine.  Anterior interbody fusion L4-L5 and L5-S1. OTHER: Negative.          CONCLUSION:   Long segment circumferential small bowel wall thickening with mild surrounding fat stranding consistent with an enteritis.  No evidence of pneumatosis or pneumoperitoneum.  Mild ascites.     Dictated by (CST): Eusebio Nelson MD on 7/14/2024 at 12:20 PM     Finalized by (CST): Eusebio Nelson MD on 7/14/2024 at 12:25 PM          CT CALCIUM SCORING OVER READ    Result Date: 7/11/2024  This is an over read for the non-cardiac, non-vascular limited visualized portions of the chest and abdomen.  PROCEDURE:  CT CALCIUM SCORING OVER READ  COMPARISON:  None.  INDICATIONS:  Z13.9 Encounter for screening  TECHNIQUE:  Unenhanced multislice CT scanning is performed through the chest as part of a cardiac CT evaluation.  Dose reduction techniques were used. Dose information is transmitted to the ACR (American College of Radiology) NRDR (National Radiology Data Registry) which includes the Dose Index Registry.  PATIENT STATED HISTORY:  (As transcribed by Technologist)     FINDINGS:  LUNGS:  No visible pulmonary disease.  DARRYL:  No mass or adenopathy.  MEDIASTINUM:  No mass or adenopathy.  PLEURA:  No mass or effusion.  CHEST WALL:  No mass or axillary adenopathy.  LIMITED ABDOMEN:  Limited images of the upper abdomen are unremarkable.  BONES:  No bony lesion or fracture.    This is an over read for the non-cardiac, non-vascular limited visualized portions of the chest and abdomen. This exam was not performed as a complete diagnostic CT of the chest. Please see the cardiologists' dictation which is reported separately.            CONCLUSION:  The visualized aspects of the lungs are clear.  No significant disease involving the noncardiac/nonvascular structures noted.    LOCATION:  [place]   Dictated by (CST): Remberto Cortes DO on 7/11/2024 at 8:44 AM     Finalized by (CST): Remberto Cortes DO on 7/11/2024 at 8:45 AM               MDM                                         Medical Decision Making  Patient feeling improved.  CT as noted above.  Mild reactive leukocytosis.  Patient now taking p.o.  No indication for admission or antibiotics.  Will go home and follow-up with his doctor and GI.  He will continue with the prescribed indications.  Will give him some symptom control for few days.  He will come back with worsening or change.    Problems Addressed:  Viral enteritis: acute illness or injury with systemic symptoms    Amount and/or Complexity of Data Reviewed  Labs: ordered. Decision-making details documented in ED Course.  Radiology: ordered and independent interpretation performed. Decision-making details documented in ED Course.     Details: By my review of the CT abdomen/pelvis there is no obvious evidence of bowel obstruction, free intraperitoneal air or significant free fluid.    Risk  OTC drugs.  Prescription drug management.  Decision regarding hospitalization.        Disposition and Plan     Clinical Impression:  1. Viral enteritis          Disposition:  Discharge  7/14/2024 12:42 pm    Follow-up:  Les Moyer DO  [address] [facility] [address]  [phone]    Follow up            Medications Prescribed:  Current Discharge Medication List        START taking these medications    Details   dicyclomine 20 MG Oral Tab Take 1 tablet (20 mg total) by mouth 4 (four) times daily as needed.  Qty: 20 tablet, Refills: 0      ondansetron 4 MG Oral Tablet Dispersible Take 1 tablet (4 mg total) by mouth every 8 (eight) hours as needed for Nausea.  Qty: 6 tablet, Refills: 0      traMADol 50 MG Oral Tab Take 1 tablet (50 mg total) by mouth every 8 (eight) hours as needed for Pain.  Qty: 10 tablet, Refills: 0    Associated Diagnoses: Viral enteritis

## 2024-08-20 RX ORDER — SILDENAFIL 50 MG/1
50 TABLET, FILM COATED ORAL
Qty: 10 TABLET | Refills: 0 | Status: SHIPPED | OUTPATIENT
Start: 2024-08-20

## 2024-08-20 NOTE — TELEPHONE ENCOUNTER
Refill passed per [facility] protocol.  Requested Prescriptions   Pending Prescriptions Disp Refills    SILDENAFIL CITRATE 50 MG Oral Tab [Pharmacy Med Name: SILDENAFIL 50MG TABLETS] 10 tablet 0     Sig: TAKE 1 TABLET(50 MG) BY MOUTH DAILY AS NEEDED FOR ERECTILE DYSFUNCTION       Genitourinary Medications Passed - 8/15/2024  8:29 PM        Passed - Patient does not have pulmonary hypertension on problem list        Passed - In person appointment or virtual visit in the past 12 mos or appointment in next 3 mos     Recent Outpatient Visits              3 months ago Acute maxillary sinusitis, recurrence not specified    [facility] ([place], [place])Mahtew Matthew,     Office Visit    4 months ago Routine general medical examination at a health care facility    [facility], [address],     Office Visit    6 months ago Right acute serous otitis media, recurrence not specified    [facility], [facility], [place]., [place], DEAN    Office Visit    6 months ago Rib pain on left side    [facility], Beverly Slater APRN    Office Visit    12 months ago COVID-19    [facility] ([place], [place]), Les Fontanez,     Telemedicine                         Recent Outpatient Visits              3 months ago Acute maxillary sinusitis, recurrence not specified    [facility] ([place] [place]Mathew Matthew, DO    Office Visit    4 months ago Routine general medical examination at a health care facility    [facility] ([place] [place]Mathew Matthew,     Office Visit    6 months ago Right acute serous otitis media, recurrence not specified    [facility], [facility], [facility], Mathew Barton Jr., Abran, APN    Office Visit    6 months ago Rib pain on left  side    [facility], [address], Beverly Slater APRN    Office Visit    12 months ago COVID-19    [facility], [address], Les Fontanez, DO    Telemedicine

## 2024-12-05 RX ORDER — SILDENAFIL 50 MG/1
50 TABLET, FILM COATED ORAL
Qty: 10 TABLET | Refills: 0 | Status: SHIPPED | OUTPATIENT
Start: 2024-12-05

## 2024-12-05 NOTE — TELEPHONE ENCOUNTER
Refill passed per [facility] protocol.  Requested Prescriptions   Pending Prescriptions Disp Refills    SILDENAFIL CITRATE 50 MG Oral Tab [Pharmacy Med Name: SILDENAFIL 50MG TABLETS] 10 tablet 0     Sig: TAKE 1 TABLET(50 MG) BY MOUTH DAILY AS NEEDED FOR ERECTILE DYSFUNCTION       Genitourinary Medications Passed - 12/5/2024 10:22 AM        Passed - Patient does not have pulmonary hypertension on problem list        Passed - In person appointment or virtual visit in the past 12 mos or appointment in next 3 mos     Recent Outpatient Visits              7 months [facility], [facility]    8 months ago Routine general medical examination at a health care facility    [facility], [address],     Office Visit    9 months ago Right acute serous otitis media, recurrence not specified    [facility], [facility], [place]., Abran[place] thompson    Office Visit    10 months ago Rib pain on left side    [facility], Beverly Slater APRN    Office Visit    1 year ago COVID-19    [facility], Les Fontanez,     Telemedicine                         Recent Outpatient Visits              7 months ago Acute maxillary sinusitis, recurrence not specified    [facility]    [facility]    8 months ago Routine general medical examination at a health care facility    [facility]     Office Visit    9 months ago Right acute serous otitis media, recurrence not specified    [facility], [facility], [facility], Mathew Barton Jr., Abran, APN    Office Visit    10 months ago Rib pain on left  side    [facility], [address], Beverly Slater APRN    Office Visit    1 year ago COVID-19    [facility], [address], Les Fontanez, DO    Telemedicine

## 2024-12-14 ENCOUNTER — HOSPITAL ENCOUNTER (OUTPATIENT)
Age: 54
Discharge: HOME | End: 2024-12-14
Payer: COMMERCIAL

## 2024-12-14 VITALS
TEMPERATURE: 98 F | HEART RATE: 76 BPM | DIASTOLIC BLOOD PRESSURE: 91 MMHG | RESPIRATION RATE: 18 BRPM | OXYGEN SATURATION: 94 % | SYSTOLIC BLOOD PRESSURE: 158 MMHG

## 2024-12-14 DIAGNOSIS — M21.619 BUNION OF UNSPECIFIED FOOT: Primary | ICD-10-CM

## 2024-12-14 DIAGNOSIS — M10.9 ACUTE GOUT OF FOOT, UNSPECIFIED CAUSE, UNSPECIFIED LATERALITY: ICD-10-CM

## 2024-12-14 PROCEDURE — 99213 OFFICE O/P EST LOW 20 MIN: CPT

## 2024-12-14 PROCEDURE — 99214 OFFICE O/P EST MOD 30 MIN: CPT

## 2024-12-14 RX ORDER — PREDNISONE 20 MG/1
40 TABLET ORAL DAILY
Qty: 10 TABLET | Refills: 0 | Status: SHIPPED | OUTPATIENT
Start: 2024-12-14 | End: 2024-12-19

## 2024-12-14 RX ORDER — TRAMADOL HYDROCHLORIDE 50 MG/1
TABLET ORAL EVERY 6 HOURS PRN
Qty: 10 TABLET | Refills: 0 | Status: SHIPPED | OUTPATIENT
Start: 2024-12-14 | End: 2024-12-19

## 2024-12-14 NOTE — ED PROVIDER NOTES
Chief Complaint   Patient presents with    Pain       HPI:     Maicol Sellers is a 53 year old male who presents for evaluation of bilateral foot pain over the last few days, notes developed right foot pain without injury few days ago with worsening left ankle and foot pain overnight.  Patient notes previous history of gout without any maintenance therapy including allopurinol.  States similar symptoms in the past treated with gout without recent evaluation.  Patient notes when out drinking last night which may have aggravated it without concerns of intoxication or withdrawal.  Denies associated fevers chills headache dizziness neck pain chest pain shortness of breath abdominal pain back pain upper extrem patient notes history of previous lumbar spinal fusion without baseline analgesic support with previous tramadol as needed without abuse history ity numbness weakness or swelling.  Patient has been taking 400 mg of ibuprofen with mild improvement, denies history of peptic ulcer disease or kidney disease.      PFSH    PFSH asessment screens reviewed and agree.  Nurses notes reviewed I agree with documentation.    Family History   Problem Relation Age of Onset    Colon Cancer Maternal Grandmother 50    Diabetes Maternal Uncle     Heart Disease Maternal Aunt         CAD, (cause of death, age 51)    Heart Disease Maternal Uncle         CAD     Family history reviewed with patient/caregiver and is not pertinent to presenting problem.  Social History     Socioeconomic History    Marital status:      Spouse name: Not on file    Number of children: Not on file    Years of education: Not on file    Highest education level: Not on file   Occupational History    Not on file   Tobacco Use    Smoking status: Every Day     Current packs/day: 0.00     Average packs/day: 1 pack/day for 30.0 years (30.0 ttl pk-yrs)     Types: Cigarettes     Start date: 11/17/1987     Last attempt to quit: 11/17/2017     Years since quitting:  7.0    Smokeless tobacco: Never   Vaping Use    Vaping status: Former   Substance and Sexual Activity    Alcohol use: Yes     Alcohol/week: 1.0 standard drink of alcohol     Types: 1 Cans of beer per week     Comment: 2 beers, weekly    Drug use: Yes     Types: Cannabis    Sexual activity: Not on file   Other Topics Concern     Service Not Asked    Blood Transfusions Not Asked    Caffeine Concern Yes     Comment: coffee, 3 cups daily    Occupational Exposure Not Asked    Hobby Hazards Not Asked    Sleep Concern Not Asked    Stress Concern Not Asked    Weight Concern Not Asked    Special Diet Not Asked    Back Care Not Asked    Exercise Yes     Comment: walking    Bike Helmet Not Asked    Seat Belt Not Asked    Self-Exams Not Asked   Social History Narrative    The patient does not use an assistive device..      The patient does live in a home with stairs.     Social Drivers of Health     Financial Resource Strain: Not on file   Food Insecurity: Not on file   Transportation Needs: Not on file   Physical Activity: Not on file   Stress: Not on file   Social Connections: Not on file   Housing Stability: Not on file         ROS:   Positive for stated complaint: Foot ankle pain.  All other systems reviewed and negative except as noted above.  Constitutional and Vital Signs Reviewed.      Physical Exam:     Findings:    There were no vitals taken for this visit.  GENERAL: well developed, well nourished, well hydrated, no distress  SKIN: good skin turgor, no obvious rashes  NECK: supple, no adenopathy  CARDIO: RRR without murmur  EXTREMITIES: Hallux valgus bilaterally.  Mild tenderness along the right dorsal lateral forefoot.  No erythema no warmth.  DP pulses sensation intact, normal Ileana test bilaterally, no pitting edema.  Mild tenderness along the left proximal dorsal foot.  No cyanosis or edema.  No lumbar paralumbar tenderness, normal straight leg raise bilaterally.  Notes previous history of lumbar spinal  fusion without baseline medication the previous tramadol for pain without abuse history noted.  VENTURA without difficulty  HEAD: normocephalic, atraumatic  EYES: sclera non icteric bilateral, conjunctiva clear  EARS: TMs clear bilaterally. Canals clear.  NOSE: nasal turbinates: pink, normal mucosa  THROAT: clear, without exudates, uvula midline, and airway patent  LUNGS: clear to auscultation bilaterally; no rales, rhonchi, or wheezes  NEURO: No focal deficits  PSYCH: Alert and oriented x3.  Answering questions appropriately.  Mood appropriate.    MDM/Assessment/Plan:   Orders for this encounter:    Orders Placed This Encounter    predniSONE 20 MG Oral Tab     Sig: Take 2 tablets (40 mg total) by mouth daily for 5 days.     Dispense:  10 tablet     Refill:  0    traMADol 50 MG Oral Tab     Sig: Take 1-2 tablets ( mg total) by mouth every 6 (six) hours as needed for Pain.     Dispense:  10 tablet     Refill:  0       Labs performed this visit:  No results found for this or any previous visit (from the past 10 hours).    MDM:  Wells criteria not warranting DVT rule out based on evaluation history.  Renal function on file from July of this year showed no acute renal impairment, patient agrees with no further metabolic evaluation at this time to yet to readdress with primary over the days ahead for consideration of uric acid level at clinical discretion.  Will cover empirically with prednisone secondary analgesic NSAIDs by recommendation OTC.  Patient ambulated well upon disposition with recommendation for limited mobility, declined radiographs for further evaluation.    Diagnosis:    ICD-10-CM    1. Bunion of unspecified foot  M21.619       2. Acute gout of foot, unspecified cause, unspecified laterality  M10.9 traMADol 50 MG Oral Tab          All results reviewed and discussed with patient.  See AVS for detailed discharge instructions for your condition today.    Follow Up with:  Les Moyer DO  [address] [place] [address]  [phone]    Schedule an appointment as soon as possible for a visit in 3 days  follow up

## 2024-12-14 NOTE — DISCHARGE INSTRUCTIONS
CONTINUE IBUPROFEN 800 MG EVERY 8 HOURS FOR PAIN FIRST LINE. DO NOT MIX WITH PREDNISONE INGESTION TO AVOID STOMACH IRRITATION.     READDRESS WITH PRIMARY IN DAYS AHEAD.     ADDRESS EMERGENTLY FOR BREAKTHROUGH CHANGES/CONCERNS.

## 2024-12-17 ENCOUNTER — TELEMEDICINE (OUTPATIENT)
Dept: FAMILY MEDICINE CLINIC | Facility: CLINIC | Age: 54
End: 2024-12-17
Payer: COMMERCIAL

## 2024-12-17 DIAGNOSIS — U07.1 COVID-19: Primary | ICD-10-CM

## 2024-12-17 PROCEDURE — 99213 OFFICE O/P EST LOW 20 MIN: CPT | Performed by: FAMILY MEDICINE

## 2024-12-17 NOTE — PROGRESS NOTES
This is a telemedicine visit with live, interactive video and audio.     Patient understands and accepts financial responsibility for any deductible, co-insurance and/or co-pays associated with this service.    SUBJECTIVE  Recent cough and congestion.  Home COVID 19 test positive.  Son also positive.  No SOB or CP.      HISTORY:  Past Medical History:    Anxiety    Anxiety state    Back problem    lower back fusion 2 years ago    Colitis    COPD (chronic obstructive pulmonary disease) (HCC)    Diverticulosis of large intestine    Esophageal reflux    Essential hypertension    Headache    per NextGen:  \"Headaches\"    High blood pressure    High cholesterol    Hyperlipidemia    MRSA infection    per NextGen:  \"MRSA skin infections; Management:  I and D, right (10/2011)\"      Past Surgical History:   Procedure Laterality Date    Back surgery      Colonoscopy N/A 9/22/2017    Procedure: COLONOSCOPY;  Surgeon: Da Jacobs MD;  Location: [facility] ENDOSCOPY    Colonoscopy N/A 9/28/2023    Procedure: COLONOSCOPY;  Surgeon: Da Jacobs MD;  Location: [facility] ENDOSCOPY    Electrocardiogram, complete  11-    Scanned to Media Tab - Date of Service 11-    Myringotomy, laser-assisted      Other surgical history      cervical fusion     Spine surgery procedure unlisted      lumbar fusion    Unlisted proc, arthroscopy Right     per NextGen:  \"arthroscopy x3 necrotizing infection right knee\"      Family History   Problem Relation Age of Onset    Colon Cancer Maternal Grandmother 50    Diabetes Maternal Uncle     Heart Disease Maternal Aunt         CAD, (cause of death, age 51)    Heart Disease Maternal Uncle         CAD      Social History     Socioeconomic History    Marital status:    Tobacco Use    Smoking status: Every Day     Current packs/day: 0.00     Average packs/day: 1 pack/day for 30.0 years (30.0 ttl pk-yrs)     Types: Cigarettes     Start date: 11/17/1987     Last attempt to quit: 11/17/2017      Years since quittin.0    Smokeless tobacco: Never   Vaping Use    Vaping status: Former   Substance and Sexual Activity    Alcohol use: Yes     Alcohol/week: 1.0 standard drink of alcohol     Types: 1 Cans of beer per week     Comment: 2 beers, weekly    Drug use: Yes     Types: Cannabis   Other Topics Concern    Caffeine Concern Yes     Comment: coffee, 3 cups daily    Exercise Yes     Comment: walking   Social History Narrative    The patient does not use an assistive device..      The patient does live in a home with stairs.        Allergies[1]   Current Outpatient Medications   Medication Sig Dispense Refill    nirmatrelvir-ritonavir 300-100 MG Oral Tablet Therapy Pack Take two nirmatrelvir tablets (300mg) with one ritonavir tablet (100mg) together twice daily for 5 days. 30 tablet 0    predniSONE 20 MG Oral Tab Take 2 tablets (40 mg total) by mouth daily for 5 days. 10 tablet 0    traMADol 50 MG Oral Tab Take 1-2 tablets ( mg total) by mouth every 6 (six) hours as needed for Pain. 10 tablet 0    Sildenafil Citrate 50 MG Oral Tab Take 1 tablet (50 mg total) by mouth daily as needed for Erectile Dysfunction. 10 tablet 0    ondansetron 4 MG Oral Tablet Dispersible Take 1 tablet (4 mg total) by mouth every 8 (eight) hours as needed for Nausea. 6 tablet 0    Omeprazole 40 MG Oral Capsule Delayed Release Take 1 capsule (40 mg total) by mouth before breakfast. 90 capsule 0    albuterol (PROAIR HFA) 108 (90 Base) MCG/ACT Inhalation Aero Soln Inhale 2 puffs into the lungs every 6 (six) hours as needed for Wheezing. 1 each 3    Fenofibrate 150 MG Oral Cap Take 1 capsule by mouth daily. 30 capsule 11    lisinopril 30 MG Oral Tab Take 1 tablet (30 mg total) by mouth daily. 30 tablet 11    fluticasone-salmeterol (WIXELA INHUB) 250-50 MCG/ACT Inhalation Aerosol Powder, Breath Activated Inhale 1 puff into the lungs every 12 (twelve) hours. 1 each 3    ALPRAZolam 0.5 MG Oral Tab Take 1 tablet (0.5 mg total) by mouth  2 (two) times daily as needed. 30 tablet 0       OBJECTIVE  Physical Exam:   alert, appears stated age, and cooperative, Speaking in full sentences comfortably, and Normal work of breathing    I spent a total of 7 minutes on the video visit.     ASSESSMENT & PLAN  Diagnoses and all orders for this visit:    COVID-19    Other orders  -     nirmatrelvir-ritonavir 300-100 MG Oral Tablet Therapy Pack; Take two nirmatrelvir tablets (300mg) with one ritonavir tablet (100mg) together twice daily for 5 days.    May take paxlovid.  Off wixela while taking.  May return to work when cough controlled and afebrile. Use albuterol inhaler as needed.       Les Moyer,            [1] No Known Allergies

## 2024-12-31 ENCOUNTER — HOSPITAL ENCOUNTER (OUTPATIENT)
Age: 54
Discharge: HOME | End: 2024-12-31
Attending: EMERGENCY MEDICINE
Payer: COMMERCIAL

## 2024-12-31 VITALS
RESPIRATION RATE: 20 BRPM | DIASTOLIC BLOOD PRESSURE: 91 MMHG | HEART RATE: 66 BPM | OXYGEN SATURATION: 97 % | TEMPERATURE: 98 F | SYSTOLIC BLOOD PRESSURE: 156 MMHG

## 2024-12-31 DIAGNOSIS — M54.9 BACK PAIN WITH RADIATION: Primary | ICD-10-CM

## 2024-12-31 PROCEDURE — 99213 OFFICE O/P EST LOW 20 MIN: CPT

## 2024-12-31 RX ORDER — CYCLOBENZAPRINE HCL 10 MG
10 TABLET ORAL 3 TIMES DAILY PRN
Qty: 20 TABLET | Refills: 0 | Status: SHIPPED | OUTPATIENT
Start: 2024-12-31 | End: 2025-01-07

## 2024-12-31 RX ORDER — TRAMADOL HYDROCHLORIDE 50 MG/1
TABLET ORAL EVERY 6 HOURS PRN
Qty: 20 TABLET | Refills: 0 | Status: SHIPPED | OUTPATIENT
Start: 2024-12-31

## 2024-12-31 RX ORDER — METHYLPREDNISOLONE 4 MG/1
TABLET ORAL
Qty: 21 EACH | Refills: 0 | Status: SHIPPED | OUTPATIENT
Start: 2024-12-31

## 2024-12-31 NOTE — ED INITIAL ASSESSMENT (HPI)
Patient with lower back pain that radiates to right leg and foot for the past week.  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  Also reports some tingling to his leg.  Denies incontinence to bowel/bladder.  Recent episode of gout.  Hx of spinal fusion.

## 2025-01-01 NOTE — ED PROVIDER NOTES
Patient Seen in: [facility]      History     Chief Complaint   Patient presents with    Back Pain     Stated Complaint: lower back pain    Subjective:   HPI      53 yo male with low back pain that radiates to the right leg and foot. 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 No specific trauma. There is tingling to the leg. No weakness or numbness. No incontinence. Has had multilevel spinal fusion in the past.     Objective:     Past Medical History:    Anxiety    Anxiety state    Back problem    lower back fusion 2 years ago    Colitis    COPD (chronic obstructive pulmonary disease) (HCC)    Diverticulosis of large intestine    Esophageal reflux    Essential hypertension    Headache    per NextGen:  \"Headaches\"    High blood pressure    High cholesterol    Hyperlipidemia    MRSA infection    per NextGen:  \"MRSA skin infections; Management:  I and D, right (10/2011)\"              Past Surgical History:   Procedure Laterality Date    Back surgery      Colonoscopy N/A 2017    Procedure: COLONOSCOPY;  Surgeon: Da Jacobs MD;  Location: [facility] ENDOSCOPY    Colonoscopy N/A 2023    Procedure: COLONOSCOPY;  Surgeon: Da Jacobs MD;  Location: [facility] ENDOSCOPY    Electrocardiogram, complete  2012    Scanned to Media Tab - Date of Service 2012    Myringotomy, laser-assisted      Other surgical history      cervical fusion     Spine surgery procedure unlisted      lumbar fusion    Unlisted proc, arthroscopy Right     per NextGen:  \"arthroscopy x3 necrotizing infection right knee\"                Social History     Socioeconomic History    Marital status:    Tobacco Use    Smoking status: Every Day     Current packs/day: 0.00     Average packs/day: 1 pack/day for 30.0 years (30.0 ttl pk-yrs)     Types: Cigarettes     Start date: 1987     Last attempt to quit: 2017     Years since quittin.1    Smokeless tobacco: Never   Vaping Use    Vaping status: Former    Substance and Sexual Activity    Alcohol use: Yes     Alcohol/week: 1.0 standard drink of alcohol     Types: 1 Cans of beer per week     Comment: 2 beers, weekly    Drug use: Yes     Types: Cannabis   Other Topics Concern    Caffeine Concern Yes     Comment: coffee, 3 cups daily    Exercise Yes     Comment: walking   Social History Narrative    The patient does not use an assistive device..      The patient does live in a home with stairs.              Review of Systems    Positive for stated complaint: lower back pain  Other systems are as noted in HPI.  Constitutional and vital signs reviewed.      All other systems reviewed and negative except as noted above.    Physical Exam     ED Triage Vitals [12/31/24 0854]   BP (!) 171/86   Pulse 66   Resp 20   Temp 98.1 °F (36.7 °C)   Temp src Oral   SpO2 97 %   O2 Device None (Room air)       Current Vitals:   Vital Signs  BP: (!) 156/91  Pulse: 66  Resp: 20  Temp: 98.1 °F (36.7 °C)  Temp src: Oral    Oxygen Therapy  SpO2: 97 %  O2 Device: None (Room air)        Physical Exam  Vitals and nursing note reviewed.   Constitutional:       Appearance: Normal appearance. He is well-developed.   HENT:      Head: Normocephalic and atraumatic.   Cardiovascular:      Rate and Rhythm: Normal rate and regular rhythm.   Pulmonary:      Effort: Pulmonary effort is normal. No respiratory distress.   Musculoskeletal:      Comments: No midline spinal tenderness. Right low lumbo sacral paraspinal tenderness. Lower extremity strength and sensation normal.    Skin:     General: Skin is warm and dry.      Capillary Refill: Capillary refill takes less than 2 seconds.   Neurological:      General: No focal deficit present.      Mental Status: He is alert.   Psychiatric:         Mood and Affect: Mood normal.         Behavior: Behavior normal.            ED Course   Labs Reviewed - No data to display                MDM             Medical Decision Making    Radiculopathy vs muscle strain. Will  discharge on steroid pack and cyclobenzaprine as prescribed.   Disposition and Plan     Clinical Impression:  1. Back pain with radiation         Disposition:  Discharge  12/31/2024  9:04 am    Follow-up:  Bernabe Guzmán MD  [address] [address]  [phone]      As needed          Medications Prescribed:  Discharge Medication List as of 12/31/2024  9:06 AM        START taking these medications    Details   methylPREDNISolone 4 MG Oral Tablet Therapy Pack Dosepack: use as directed on packaging, Normal, Disp-21 each, R-0      cyclobenzaprine 10 MG Oral Tab Take 1 tablet (10 mg total) by mouth 3 (three) times daily as needed for Muscle spasms., Normal, Disp-20 tablet, R-0                 Supplementary Documentation:

## 2025-01-24 ENCOUNTER — OFFICE VISIT (OUTPATIENT)
Dept: FAMILY MEDICINE CLINIC | Facility: CLINIC | Age: 55
End: 2025-01-24
Payer: COMMERCIAL

## 2025-01-24 VITALS
DIASTOLIC BLOOD PRESSURE: 92 MMHG | BODY MASS INDEX: 31.64 KG/M2 | SYSTOLIC BLOOD PRESSURE: 145 MMHG | OXYGEN SATURATION: 96 % | WEIGHT: 226 LBS | HEART RATE: 71 BPM | HEIGHT: 71 IN

## 2025-01-24 DIAGNOSIS — I10 ESSENTIAL HYPERTENSION: ICD-10-CM

## 2025-01-24 DIAGNOSIS — M51.369 LUMBAR DISC NARROWING: Primary | ICD-10-CM

## 2025-01-24 DIAGNOSIS — F41.9 ANXIETY: ICD-10-CM

## 2025-01-24 DIAGNOSIS — Z30.09 VASECTOMY EVALUATION: ICD-10-CM

## 2025-01-24 DIAGNOSIS — M99.03 LUMBAR REGION SOMATIC DYSFUNCTION: ICD-10-CM

## 2025-01-24 PROCEDURE — 3080F DIAST BP >= 90 MM HG: CPT | Performed by: FAMILY MEDICINE

## 2025-01-24 PROCEDURE — 99214 OFFICE O/P EST MOD 30 MIN: CPT | Performed by: FAMILY MEDICINE

## 2025-01-24 PROCEDURE — 3077F SYST BP >= 140 MM HG: CPT | Performed by: FAMILY MEDICINE

## 2025-01-24 PROCEDURE — 3008F BODY MASS INDEX DOCD: CPT | Performed by: FAMILY MEDICINE

## 2025-01-24 PROCEDURE — 98925 OSTEOPATH MANJ 1-2 REGIONS: CPT | Performed by: FAMILY MEDICINE

## 2025-01-24 RX ORDER — HYDROCODONE BITARTRATE AND ACETAMINOPHEN 7.5; 325 MG/1; MG/1
1 TABLET ORAL EVERY 4 HOURS PRN
Qty: 30 TABLET | Refills: 0 | Status: SHIPPED | OUTPATIENT
Start: 2025-01-24 | End: 2025-02-23

## 2025-01-24 RX ORDER — CYCLOBENZAPRINE HCL 10 MG
10 TABLET ORAL NIGHTLY
Qty: 30 TABLET | Refills: 0 | Status: SHIPPED | OUTPATIENT
Start: 2025-01-24 | End: 2025-02-23

## 2025-01-24 RX ORDER — FLUTICASONE PROPIONATE 50 MCG
2 SPRAY, SUSPENSION (ML) NASAL DAILY
Qty: 1 EACH | Refills: 1 | Status: SHIPPED | OUTPATIENT
Start: 2025-01-24 | End: 2026-01-19

## 2025-01-24 RX ORDER — PREDNISONE 20 MG/1
20 TABLET ORAL 2 TIMES DAILY
Qty: 14 TABLET | Refills: 0 | Status: SHIPPED | OUTPATIENT
Start: 2025-01-24 | End: 2025-01-31

## 2025-01-24 NOTE — PROCEDURES
Psoas and sacral somatic dysfunction treated with soft tissue and muscle energy.  Tolerated well.

## 2025-01-24 NOTE — PROGRESS NOTES
Subjective:   Maicol Sellers is a 54 year old male who presents for Follow - Up (Follow up on back pain )       History/Other:    Chief Complaint Reviewed and Verified  Nursing Notes Reviewed and   Verified  Tobacco Reviewed  Allergies Reviewed  Medications Reviewed    Problem List Reviewed  Medical History Reviewed  Surgical History   Reviewed  Family History Reviewed  Social History Reviewed         Tobacco:  Social History     Tobacco Use   Smoking Status Every Day    Current packs/day: 0.00    Average packs/day: 1 pack/day for 30.0 years (30.0 ttl pk-yrs)    Types: Cigarettes    Start date: 1987    Last attempt to quit: 2017    Years since quittin.1   Smokeless Tobacco Never     E-Cigarettes/Vaping       Questions Responses    E-Cigarette Use Former User          E-Cigarette/Vaping Substances       Questions Responses    Nicotine No    THC No    CBD No    Flavoring No          E-Cigarette/Vaping Devices       Questions Responses    Disposable No    Pre-filled or Refillable Cartridge No    Refillable Tank No    Pre-filled Pod No           Tobacco cessation counseling .      Current Outpatient Medications   Medication Sig Dispense Refill    cyclobenzaprine 10 MG Oral Tab Take 1 tablet (10 mg total) by mouth nightly for 30 doses. 30 tablet 0    predniSONE 20 MG Oral Tab Take 1 tablet (20 mg total) by mouth 2 (two) times daily for 7 days. 14 tablet 0    HYDROcodone-acetaminophen 7.5-325 MG Oral Tab Take 1 tablet by mouth every 4 (four) hours as needed for Pain. 30 tablet 0    fluticasone propionate 50 MCG/ACT Nasal Suspension 2 sprays by Each Nare route daily. 1 each 1    Sildenafil Citrate 50 MG Oral Tab Take 1 tablet (50 mg total) by mouth daily as needed for Erectile Dysfunction. 10 tablet 0    Omeprazole 40 MG Oral Capsule Delayed Release Take 1 capsule (40 mg total) by mouth before breakfast. 90 capsule 0    albuterol (PROAIR HFA) 108 (90 Base) MCG/ACT Inhalation Aero Soln Inhale 2  puffs into the lungs every 6 (six) hours as needed for Wheezing. 1 each 3    Fenofibrate 150 MG Oral Cap Take 1 capsule by mouth daily. 30 capsule 11    lisinopril 30 MG Oral Tab Take 1 tablet (30 mg total) by mouth daily. 30 tablet 11    fluticasone-salmeterol (WIXELA INHUB) 250-50 MCG/ACT Inhalation Aerosol Powder, Breath Activated Inhale 1 puff into the lungs every 12 (twelve) hours. 1 each 3    ALPRAZolam 0.5 MG Oral Tab Take 1 tablet (0.5 mg total) by mouth 2 (two) times daily as needed. 30 tablet 0         Review of Systems:  Review of Systems   Musculoskeletal:         Lumbar back pain to the right which seems to wrap around his right thigh         Objective:   BP (!) 145/92   Pulse 71   Ht 5' 11\" (1.803 m)   Wt 226 lb (102.5 kg)   SpO2 96%   BMI 31.52 kg/m²  Estimated body mass index is 31.52 kg/m² as calculated from the following:    Height as of this encounter: 5' 11\" (1.803 m).    Weight as of this encounter: 226 lb (102.5 kg).  Physical Exam  Musculoskeletal:      Comments: Right low back pain and tenderness lumbar region extends into the sacral region.  Tight psoas on the right.  Negative straight leg testing bilaterally.  Bilateral lower extremity strength DTRs and sensory exam normal     Psoas and sacral somatic dysfunction treated with soft tissue and muscle energy.  Tolerated well.        Assessment & Plan:   1. L4-5 right mild-mod bulging disc, L3-4 left foraminal mod bulging discs (Primary)  -     HYDROcodone-Acetaminophen; Take 1 tablet by mouth every 4 (four) hours as needed for Pain.  Dispense: 30 tablet; Refill: 0  2. Vasectomy evaluation  -     Urology Referral - In Network  3. Anxiety  4. Essential hypertension  5. Lumbar region somatic dysfunction  Other orders  -     Cyclobenzaprine HCl; Take 1 tablet (10 mg total) by mouth nightly for 30 doses.  Dispense: 30 tablet; Refill: 0  -     predniSONE; Take 1 tablet (20 mg total) by mouth 2 (two) times daily for 7 days.  Dispense: 14 tablet;  Refill: 0  -     Fluticasone Propionate; 2 sprays by Each Nare route daily.  Dispense: 1 each; Refill: 1    Provided some release with local MT today.  Will do a muscle relaxant mostly at night.  Prednisone this week.  Home stretching.  Follow-up in 1 week for reevaluation.  Also addressed some other concerns and ordered a referral and in addition follow-up for elevated blood pressure and anxiety    No follow-ups on file.    Les Moyer DO, 1/24/2025, 11:34 AM

## 2025-01-27 PROCEDURE — 99284 EMERGENCY DEPT VISIT MOD MDM: CPT

## 2025-01-27 PROCEDURE — 99285 EMERGENCY DEPT VISIT HI MDM: CPT

## 2025-01-28 ENCOUNTER — HOSPITAL ENCOUNTER (EMERGENCY)
Facility: HOSPITAL | Age: 55
Discharge: HOME | End: 2025-01-28
Attending: EMERGENCY MEDICINE
Payer: COMMERCIAL

## 2025-01-28 ENCOUNTER — APPOINTMENT (OUTPATIENT)
Dept: CT IMAGING | Facility: HOSPITAL | Age: 55
End: 2025-01-28
Attending: EMERGENCY MEDICINE
Payer: COMMERCIAL

## 2025-01-28 VITALS
TEMPERATURE: 98 F | OXYGEN SATURATION: 94 % | SYSTOLIC BLOOD PRESSURE: 135 MMHG | WEIGHT: 225 LBS | DIASTOLIC BLOOD PRESSURE: 83 MMHG | HEART RATE: 69 BPM | HEIGHT: 71 IN | RESPIRATION RATE: 16 BRPM | BODY MASS INDEX: 31.5 KG/M2

## 2025-01-28 DIAGNOSIS — A08.4 VIRAL ENTERITIS: Primary | ICD-10-CM

## 2025-01-28 LAB
ALBUMIN SERPL-MCNC: 4.7 G/DL (ref 3.2–4.8)
ALP LIVER SERPL-CCNC: 48 U/L
ALT SERPL-CCNC: 16 U/L
ANION GAP SERPL CALC-SCNC: 9 MMOL/L (ref 0–18)
AST SERPL-CCNC: 12 U/L (ref ?–34)
BASOPHILS # BLD AUTO: 0.03 X10(3) UL (ref 0–0.2)
BASOPHILS NFR BLD AUTO: 0.3 %
BILIRUB DIRECT SERPL-MCNC: 0.2 MG/DL (ref ?–0.3)
BILIRUB SERPL-MCNC: 0.5 MG/DL (ref 0.3–1.2)
BILIRUB UR QL: NEGATIVE
BUN BLD-MCNC: 20 MG/DL (ref 9–23)
BUN/CREAT SERPL: 19.4 (ref 10–20)
CALCIUM BLD-MCNC: 9.5 MG/DL (ref 8.7–10.4)
CHLORIDE SERPL-SCNC: 108 MMOL/L (ref 98–112)
CLARITY UR: CLEAR
CO2 SERPL-SCNC: 22 MMOL/L (ref 21–32)
CREAT BLD-MCNC: 1.03 MG/DL
DEPRECATED RDW RBC AUTO: 39.8 FL (ref 35.1–46.3)
EGFRCR SERPLBLD CKD-EPI 2021: 86 ML/MIN/1.73M2 (ref 60–?)
EOSINOPHIL # BLD AUTO: 0.1 X10(3) UL (ref 0–0.7)
EOSINOPHIL NFR BLD AUTO: 0.9 %
ERYTHROCYTE [DISTWIDTH] IN BLOOD BY AUTOMATED COUNT: 13 % (ref 11–15)
GLUCOSE BLD-MCNC: 99 MG/DL (ref 70–99)
GLUCOSE UR-MCNC: NORMAL MG/DL
HCT VFR BLD AUTO: 41.2 %
HGB BLD-MCNC: 14.2 G/DL
HGB UR QL STRIP.AUTO: NEGATIVE
IMM GRANULOCYTES # BLD AUTO: 0.05 X10(3) UL (ref 0–1)
IMM GRANULOCYTES NFR BLD: 0.4 %
KETONES UR-MCNC: NEGATIVE MG/DL
LEUKOCYTE ESTERASE UR QL STRIP.AUTO: NEGATIVE
LYMPHOCYTES # BLD AUTO: 2.4 X10(3) UL (ref 1–4)
LYMPHOCYTES NFR BLD AUTO: 20.9 %
MCH RBC QN AUTO: 29 PG (ref 26–34)
MCHC RBC AUTO-ENTMCNC: 34.5 G/DL (ref 31–37)
MCV RBC AUTO: 84.1 FL
MONOCYTES # BLD AUTO: 0.74 X10(3) UL (ref 0.1–1)
MONOCYTES NFR BLD AUTO: 6.4 %
NEUTROPHILS # BLD AUTO: 8.16 X10 (3) UL (ref 1.5–7.7)
NEUTROPHILS # BLD AUTO: 8.16 X10(3) UL (ref 1.5–7.7)
NEUTROPHILS NFR BLD AUTO: 71.1 %
NITRITE UR QL STRIP.AUTO: NEGATIVE
OSMOLALITY SERPL CALC.SUM OF ELEC: 291 MOSM/KG (ref 275–295)
PH UR: 5 [PH] (ref 5–8)
PLATELET # BLD AUTO: 252 10(3)UL (ref 150–450)
POTASSIUM SERPL-SCNC: 4 MMOL/L (ref 3.5–5.1)
PROT SERPL-MCNC: 7 G/DL (ref 5.7–8.2)
PROT UR-MCNC: NEGATIVE MG/DL
RBC # BLD AUTO: 4.9 X10(6)UL
SODIUM SERPL-SCNC: 139 MMOL/L (ref 136–145)
SP GR UR STRIP: 1.02 (ref 1–1.03)
UROBILINOGEN UR STRIP-ACNC: NORMAL
WBC # BLD AUTO: 11.5 X10(3) UL (ref 4–11)

## 2025-01-28 PROCEDURE — 80048 BASIC METABOLIC PNL TOTAL CA: CPT | Performed by: EMERGENCY MEDICINE

## 2025-01-28 PROCEDURE — 81003 URINALYSIS AUTO W/O SCOPE: CPT | Performed by: EMERGENCY MEDICINE

## 2025-01-28 PROCEDURE — 80076 HEPATIC FUNCTION PANEL: CPT | Performed by: EMERGENCY MEDICINE

## 2025-01-28 PROCEDURE — 85025 COMPLETE CBC W/AUTO DIFF WBC: CPT | Performed by: EMERGENCY MEDICINE

## 2025-01-28 PROCEDURE — 96374 THER/PROPH/DIAG INJ IV PUSH: CPT

## 2025-01-28 PROCEDURE — 74177 CT ABD & PELVIS W/CONTRAST: CPT | Performed by: EMERGENCY MEDICINE

## 2025-01-28 RX ORDER — MORPHINE SULFATE 4 MG/ML
4 INJECTION, SOLUTION INTRAMUSCULAR; INTRAVENOUS ONCE
Status: COMPLETED | OUTPATIENT
Start: 2025-01-28 | End: 2025-01-28

## 2025-01-28 NOTE — ED INITIAL ASSESSMENT (HPI)
Pt presents to ED with c/o abd pain 7/10 constant, diarrhea, belching. Pt sts PMH of diverticulitis

## 2025-01-28 NOTE — ED PROVIDER NOTES
Patient Seen in: [facility]      History     Chief Complaint   Patient presents with    Abdomen/Flank Pain     Stated Complaint: Abd pain,Diarreah    Subjective:   HPI      Patient is a 54-year-old male who presents with left lower quadrant abdominal pain since 4 AM.  Positive diarrhea that he describes as nonbloody.  No fevers or vomiting.  He feels dizzy.  Feels similar to diverticulitis in the past.    Objective:     No pertinent past medical history.            Past Surgical History:   Procedure Laterality Date    Back surgery      Colonoscopy N/A 9/22/2017    Procedure: COLONOSCOPY;  Surgeon: Da Jacobs MD;  Location: [facility] ENDOSCOPY    Colonoscopy N/A 9/28/2023    Procedure: COLONOSCOPY;  Surgeon: Da Jacobs MD;  Location: [facility] ENDOSCOPY    Electrocardiogram, complete  11-    Scanned to Media Tab - Date of Service 11-    Myringotomy, laser-assisted      Other surgical history      cervical fusion     Spine surgery procedure unlisted      lumbar fusion    Unlisted proc, arthroscopy Right     per NextGen:  \"arthroscopy x3 necrotizing infection right knee\"                No pertinent social history.                Physical Exam     ED Triage Vitals [01/27/25 2335]   BP (!) 153/102   Pulse 80   Resp 20   Temp 98 °F (36.7 °C)   Temp src Skin   SpO2 96 %   O2 Device None (Room air)       Current Vitals:   Vital Signs  BP: (!) 146/93  Pulse: 62  Resp: 16  Temp: 98 °F (36.7 °C)  Temp src: Skin  MAP (mmHg): (!) 103    Oxygen Therapy  SpO2: 93 %  O2 Device: None (Room air)        Physical Exam  GENERAL: No acute distress, awake and alert  HEENT: EOMI, PERRL  Neck: supple  CV: RRR, no murmurs  Resp: CTAB, no wheezes or retractions  Ab: soft, TTP in LLQ with guarding. No rebound or masses, bs normal  Extremities: FROM of all extremities  Neuro: CN intact, normal speech, normal gait, 5/5 motor strength in all extremities, no focal deficits  SKIN: warm, dry, no  lucrecia      ED Course     Labs Reviewed   CBC WITH DIFFERENTIAL WITH PLATELET - Abnormal; Notable for the following components:       Result Value    WBC 11.5 (*)     Neutrophil Absolute Prelim 8.16 (*)     Neutrophil Absolute 8.16 (*)     All other components within normal limits   BASIC METABOLIC PANEL (8) - Normal   HEPATIC FUNCTION PANEL (7) - Normal   URINALYSIS WITH CULTURE REFLEX          [facility]      Medical Decision Making  Ddx: diverticulitis, UTI, kidney stone  Pt given morphine for pain  Labs reassuring    Pain improved on reassessment. Feels comfortable with discharge. Advised on home care    Amount and/or Complexity of Data Reviewed  External Data Reviewed: labs and radiology.     Details: Labs/CT from 7/2024 reviewed  Labs: ordered.  Radiology: ordered.     Details:   CT ABDOMEN PELVIS WITH IV CONTRAST      IMPRESSION:  -Liquid stool throughout the large bowel is consistent with diarrhea and may represent gastroenteritis in the appropriate clinical setting.  No large bowel wall thickening.    -Normal appendix and small bowel.  -No aortic aneurysm.  -No evidence of an obstructive uropathy.      Risk  Parenteral controlled substances.        Disposition and Plan     Clinical Impression:  1. Viral enteritis         Disposition:  Discharge  1/28/2025  4:06 am    Follow-up:  Les Moyer DO  [address] [facility] [address]  [phone]    Follow up            Medications Prescribed:  Current Discharge Medication List              Supplementary Documentation:

## 2025-01-30 ENCOUNTER — OFFICE VISIT (OUTPATIENT)
Dept: FAMILY MEDICINE CLINIC | Facility: CLINIC | Age: 55
End: 2025-01-30
Payer: COMMERCIAL

## 2025-01-30 VITALS
HEART RATE: 65 BPM | OXYGEN SATURATION: 94 % | WEIGHT: 226 LBS | BODY MASS INDEX: 31.64 KG/M2 | DIASTOLIC BLOOD PRESSURE: 83 MMHG | SYSTOLIC BLOOD PRESSURE: 145 MMHG | HEIGHT: 71 IN

## 2025-01-30 DIAGNOSIS — I10 PRIMARY HYPERTENSION: ICD-10-CM

## 2025-01-30 DIAGNOSIS — M51.369 LUMBAR DISC NARROWING: Primary | ICD-10-CM

## 2025-01-30 PROCEDURE — 3008F BODY MASS INDEX DOCD: CPT | Performed by: FAMILY MEDICINE

## 2025-01-30 PROCEDURE — 3077F SYST BP >= 140 MM HG: CPT | Performed by: FAMILY MEDICINE

## 2025-01-30 PROCEDURE — 99214 OFFICE O/P EST MOD 30 MIN: CPT | Performed by: FAMILY MEDICINE

## 2025-01-30 PROCEDURE — 3079F DIAST BP 80-89 MM HG: CPT | Performed by: FAMILY MEDICINE

## 2025-01-30 RX ORDER — LISINOPRIL 10 MG/1
10 TABLET ORAL DAILY
Qty: 30 TABLET | Refills: 1 | Status: SHIPPED | OUTPATIENT
Start: 2025-01-30 | End: 2026-01-25

## 2025-01-30 NOTE — PROGRESS NOTES
Subjective:   Maicol Sellers is a 54 year old male who presents for Follow - Up (Back and medication follow up)       History/Other:    Chief Complaint Reviewed and Verified  Nursing Notes Reviewed and   Verified  Tobacco Reviewed  Allergies Reviewed  Medications Reviewed    Problem List Reviewed  Medical History Reviewed  Surgical History   Reviewed  Family History Reviewed  Social History Reviewed         Tobacco:  Social History     Tobacco Use   Smoking Status Every Day    Current packs/day: 0.00    Average packs/day: 1 pack/day for 30.0 years (30.0 ttl pk-yrs)    Types: Cigarettes    Start date: 1987    Last attempt to quit: 2017    Years since quittin.2   Smokeless Tobacco Never     E-Cigarettes/Vaping       Questions Responses    E-Cigarette Use Former User          E-Cigarette/Vaping Substances       Questions Responses    Nicotine No    THC No    CBD No    Flavoring No          E-Cigarette/Vaping Devices       Questions Responses    Disposable No    Pre-filled or Refillable Cartridge No    Refillable Tank No    Pre-filled Pod No        .      Current Outpatient Medications   Medication Sig Dispense Refill    lisinopril 10 MG Oral Tab Take 1 tablet (10 mg total) by mouth daily. 30 tablet 1    cyclobenzaprine 10 MG Oral Tab Take 1 tablet (10 mg total) by mouth nightly for 30 doses. 30 tablet 0    predniSONE 20 MG Oral Tab Take 1 tablet (20 mg total) by mouth 2 (two) times daily for 7 days. 14 tablet 0    fluticasone propionate 50 MCG/ACT Nasal Suspension 2 sprays by Each Nare route daily. 1 each 1    Sildenafil Citrate 50 MG Oral Tab Take 1 tablet (50 mg total) by mouth daily as needed for Erectile Dysfunction. 10 tablet 0    Omeprazole 40 MG Oral Capsule Delayed Release Take 1 capsule (40 mg total) by mouth before breakfast. 90 capsule 0    albuterol (PROAIR HFA) 108 (90 Base) MCG/ACT Inhalation Aero Soln Inhale 2 puffs into the lungs every 6 (six) hours as needed for Wheezing. 1  each 3    Fenofibrate 150 MG Oral Cap Take 1 capsule by mouth daily. 30 capsule 11    lisinopril 30 MG Oral Tab Take 1 tablet (30 mg total) by mouth daily. 30 tablet 11    fluticasone-salmeterol (WIXELA INHUB) 250-50 MCG/ACT Inhalation Aerosol Powder, Breath Activated Inhale 1 puff into the lungs every 12 (twelve) hours. 1 each 3    HYDROcodone-acetaminophen 7.5-325 MG Oral Tab Take 1 tablet by mouth every 4 (four) hours as needed for Pain. (Patient not taking: Reported on 1/30/2025) 30 tablet 0    ALPRAZolam 0.5 MG Oral Tab Take 1 tablet (0.5 mg total) by mouth 2 (two) times daily as needed. (Patient not taking: Reported on 1/30/2025) 30 tablet 0         Review of Systems:  Review of Systems   Constitutional: Negative.    Respiratory: Negative.     Cardiovascular: Negative.    Musculoskeletal:  Positive for back pain.        Improved.          Objective:   /83 (BP Location: Right arm, Patient Position: Sitting, Cuff Size: adult)   Pulse 65   Ht 5' 11\" (1.803 m)   Wt 226 lb (102.5 kg)   SpO2 94%   BMI 31.52 kg/m²  Estimated body mass index is 31.52 kg/m² as calculated from the following:    Height as of this encounter: 5' 11\" (1.803 m).    Weight as of this encounter: 226 lb (102.5 kg).  Physical Exam  Musculoskeletal:      Lumbar back: Tenderness present. Normal range of motion. Negative right straight leg raise test and negative left straight leg raise test.      Comments: Somatic dysfunction the right lower lumbar and sacral   Neurological:      Sensory: Sensation is intact.      Motor: Motor function is intact.     Lumbar and sacral somatic dysfunction treated with soft tissue, muscle energy.  Tolerated well.        Assessment & Plan:   1. L4-5 right mild-mod bulging disc, L3-4 left foraminal mod bulging discs (Primary)  2. Primary hypertension  Other orders  -     Lisinopril; Take 1 tablet (10 mg total) by mouth daily.  Dispense: 30 tablet; Refill: 1    Improving.  Complete meds.  Increase lisinopril  to 40mg daily.  Return in one month for BP check .       No follow-ups on file.    Les Moyer DO, 1/30/2025, 10:01 AM

## 2025-02-13 RX ORDER — OMEPRAZOLE 40 MG/1
40 CAPSULE, DELAYED RELEASE ORAL
Qty: 90 CAPSULE | Refills: 3 | Status: SHIPPED | OUTPATIENT
Start: 2025-02-13

## 2025-02-13 NOTE — TELEPHONE ENCOUNTER
Refill passed per [facility] protocol.     Requested Prescriptions   Pending Prescriptions Disp Refills    OMEPRAZOLE 40 MG Oral Capsule Delayed Release [Pharmacy Med Name: OMEPRAZOLE 40MG CAPSULES] 90 capsule 0     Sig: TAKE 1 CAPSULE(40 MG) BY MOUTH BEFORE BREAKFAST       Gastrointestional Medication Protocol Passed - 2/13/2025  7:30 AM        Passed - In person appointment or virtual visit in the past 12 mos or appointment in next 3 mos     Recent Outpatient Visits              2 weeks ago L4-5 right mild-mod bulging disc, L3-4 left foraminal mod bulging discs    [facility]     [facility]    2 weeks ago L4-5 right mild-mod bulging disc, L3-4 left foraminal mod bulging discs    [facility]     [facility]    [facility]    9 months ago [facility] maxillary [facility], recurrence [facility]    10 months ago Routine general medical examination at a health care facility    [facility],     Office Visit                      Passed - Medication is active on med list             [unfilled]      [unfilled]

## 2025-03-14 ENCOUNTER — HOSPITAL ENCOUNTER (OUTPATIENT)
Age: 55
Discharge: HOME | End: 2025-03-14
Attending: EMERGENCY MEDICINE
Payer: COMMERCIAL

## 2025-03-14 VITALS
TEMPERATURE: 98 F | OXYGEN SATURATION: 96 % | HEART RATE: 60 BPM | DIASTOLIC BLOOD PRESSURE: 101 MMHG | SYSTOLIC BLOOD PRESSURE: 165 MMHG | RESPIRATION RATE: 17 BRPM

## 2025-03-14 DIAGNOSIS — J01.90 ACUTE SINUSITIS, RECURRENCE NOT SPECIFIED, UNSPECIFIED LOCATION: Primary | ICD-10-CM

## 2025-03-14 PROCEDURE — 99213 OFFICE O/P EST LOW 20 MIN: CPT

## 2025-03-14 NOTE — ED PROVIDER NOTES
Patient Seen in: [facility]      History     Chief Complaint   Patient presents with    Cough/URI     Stated Complaint: Sinus    Subjective:   HPI      54-year-old male presents for evaluation of sinus discomfort.  Patient with viral symptoms, tested positive for influenza B last week.  For the past week+ has had continuous rhinorrhea, congestion, sinus pressure.  No difficulty breathing.    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              No pertinent social history.            Review of Systems    Positive for stated complaint: Sinus  Other systems are as noted in HPI.  Constitutional and vital signs reviewed.      All other systems reviewed and negative except as noted above.    Physical Exam     ED Triage Vitals [03/14/25 0833]   BP (!) 165/101   Pulse 60   Resp 17   Temp 98 °F (36.7 °C)   Temp src Oral   SpO2 96 %   O2 Device None (Room air)       Current Vitals:   Vital Signs  BP: (!) 165/101  Pulse: 60  Resp: 17  Temp: 98 °F (36.7 °C)  Temp src: Oral    Oxygen Therapy  SpO2: 96 %  O2 Device: None (Room air)        Physical Exam  Vitals and nursing note reviewed.   Constitutional:       General: He is not in acute distress.     Appearance: Normal appearance. He is not toxic-appearing.   HENT:      Head: Normocephalic and atraumatic.   Eyes:      Conjunctiva/sclera: Conjunctivae normal.   Cardiovascular:      Rate and Rhythm: Normal rate.   Pulmonary:      Effort: Pulmonary effort is normal. No respiratory distress.    Musculoskeletal:         General: Normal range of motion.      Cervical back: Normal range of motion and neck supple. No rigidity.   Neurological:      General: No focal deficit present.      Mental Status: He is alert.   Psychiatric:         Mood and Affect: Mood normal.             ED Course   Labs Reviewed - No data to display                MDM             Medical Decision Making  Suspect sinusitis, will cover for bacterial sinusitis given time course.  Discussed recommendation for outpatient follow-up if not improving after finishing a course of antibiotics.  Patient and wife at the bedside verbalized understanding of and agreement with this plan.    Problems Addressed:  Acute sinusitis, recurrence not specified, unspecified location: acute illness or injury    Risk  Prescription drug management.        Disposition and Plan     Clinical Impression:  1. Acute sinusitis, recurrence not specified, unspecified location         Disposition:  Discharge  3/14/2025  8:56 am    Follow-up:  Les Moyer, DO  [address] [facility] [address]  [phone]      As needed          Medications Prescribed:  Discharge Medication List as of 3/14/2025  8:57 AM        START taking these medications    Details   amoxicillin clavulanate 875-125 MG Oral Tab Take 1 tablet by mouth 2 (two) times daily for 7 days., Normal, Disp-14 tablet, R-0                 Supplementary Documentation:

## 2025-03-14 NOTE — DISCHARGE INSTRUCTIONS
Take the antibiotic prescribed to you today as directed.  Make sure to finish the entire course even if you start to feel better.    Take ibuprofen and/or Tylenol as needed for pain.    Push fluids and get rest.    See primary care if not improving after finishing antibiotics.

## 2025-03-26 ENCOUNTER — AUDIOLOGY DOCUMENTATION (OUTPATIENT)
Dept: AUDIOLOGY | Facility: CLINIC | Age: 55
End: 2025-03-26

## 2025-03-26 ENCOUNTER — OFFICE VISIT (OUTPATIENT)
Dept: AUDIOLOGY | Facility: CLINIC | Age: 55
End: 2025-03-26
Payer: COMMERCIAL

## 2025-03-26 ENCOUNTER — OFFICE VISIT (OUTPATIENT)
Dept: OTOLARYNGOLOGY | Facility: CLINIC | Age: 55
End: 2025-03-26
Payer: COMMERCIAL

## 2025-03-26 DIAGNOSIS — H90.3 SENSORINEURAL HEARING LOSS, BILATERAL: Primary | ICD-10-CM

## 2025-03-26 DIAGNOSIS — H90.3 SENSORINEURAL HEARING LOSS (SNHL), BILATERAL: ICD-10-CM

## 2025-03-26 DIAGNOSIS — H93.13 TINNITUS OF BOTH EARS: Primary | ICD-10-CM

## 2025-03-26 PROCEDURE — 92567 TYMPANOMETRY: CPT | Performed by: AUDIOLOGIST

## 2025-03-26 PROCEDURE — 99214 OFFICE O/P EST MOD 30 MIN: CPT | Performed by: OTOLARYNGOLOGY

## 2025-03-26 PROCEDURE — 92557 COMPREHENSIVE HEARING TEST: CPT | Performed by: AUDIOLOGIST

## 2025-03-26 NOTE — PROGRESS NOTES
The following individual(s) verbally consented to be recorded using ambient AI listening technology and understand that they can each withdraw their consent to this listening technology at any point by asking the clinician to turn off or pause the recording: Patient consents    Patient name: Maicol Sellers

## 2025-03-26 NOTE — PROGRESS NOTES
Hearing Aid Information       Right    Left   Make: [employer] Make: O[employer]on   Model: MORE 1 MINI RITE R Model: MORE 1 MINI RITE R   Serial Number: [id number] Serial Number: [id number]   Date of Purchase: 03/23/22 Date of Purchase: 03/23/22   Repair Warranty Exp: 03/23/25 Repair Warranty Exp: 03/23/25   L&D Warranty Exp: 03/23/25 L&D Warranty Exp: 03/23/25   Color:  (SILVER GREY) Color:  (SILVER GREY)   Battery:  (RECHARGEABLE) Battery:  (RECAHRGEABLE)     Patient was seen by Dr. Mancini today and had a hearing test.  He dropped both aids at the .   on right was broken  Both aids just under warranty    Spoke to patient- it was decided that aids should go into [employer] for one last clean and check before warranty ends.    Patient is aware that aids will be gone 7-10 working days    Strongly recommend HAC appt- patient has not been seen in 3 years  He wishes   when aids return    Case in cabinet     No charges entered.

## 2025-03-28 NOTE — PROGRESS NOTES
NEW PATIENT PROGRESS NOTE  OTOLOGY/OTOLARYNGOLOGY    REF MD:  No referring provider defined for this encounter.     PCP: Les Moyer DO    CHIEF COMPLAINT:    Chief Complaint   Patient presents with    Ringing In Ear     Ringing in both ears  Pt reports ringing has worsened       History of Present Illness  Maicol Sellers is a 54 year old male who presents with worsening tinnitus.    He experiences constant tinnitus, which has been worsening over time. The tinnitus improves with the use of hearing aids, but he broke some time ago. He uses background noise, such as sleep sounds from YouTube, to help manage the tinnitus, especially at night. The tinnitus is particularly bothersome in silence and can exacerbate his anxiety, leading to difficulty sleeping.    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 His last hearing test was three years ago, and today's results show no significant changes, indicating stable hearing loss.    He experiences anxiety attacks, which he associates with the onset of tinnitus. He has discussed this with his primary care provider, who prescribed medication, but he is hesitant to rely on medication and has not sought therapy for anxiety.    He has a history of neck pain, for which he underwent surgery. No jaw pain or headaches. His neck pain is the best it can be post-surgery.    He has a history of ear tube placement, with the last set falling out without subsequent issues. His ear pressure has been stable since then.    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 ENDOSCOPY    Colonoscopy N/A 2023    Procedure: COLONOSCOPY;  Surgeon: Da Jacobs MD;  Location: [facility] ENDOSCOPY    Electrocardiogram, complete  2012    Scanned to Media Tab - Date of Service 2012    Myringotomy, laser-assisted      Other surgical history      cervical fusion     Spine surgery procedure unlisted      lumbar fusion    Unlisted proc, arthroscopy Right     per [employer]:  \"arthroscopy x3 necrotizing infection right knee\"       Medications Ordered Prior to Encounter[1]    Allergies: Allergies[2]    SOCIAL HISTORY:    Social History     Tobacco Use    Smoking status: Every Day     Current packs/day: 0.00     Average packs/day: 1 pack/day for 30.0 years (30.0 ttl pk-yrs)     Types: Cigarettes     Start date: 1987     Last attempt to quit: 2017     Years since quittin.3    Smokeless tobacco: Never   Substance Use Topics    Alcohol use: Yes     Alcohol/week: 1.0 standard drink of alcohol     Types: 1 Cans of beer per week     Comment: 2 beers, weekly       Family History   Problem Relation Age of Onset    Colon Cancer Maternal Grandmother 50    Diabetes Maternal Uncle     Heart Disease Maternal Aunt         CAD, (cause of death, age 51)    Heart Disease Maternal Uncle         CAD       REVIEW OF SYSTEMS:   PER HPI    EXAMINATION:  I washed my hands with an alcohol-based hand gel prior to examination  Constitutional:   --Vitals: There were no vitals taken for this visit.  --General: no apparent distress, well-developed, conversant  Psych: affect pleasant and appropriate for age, alert and oriented  Neuro: Facial movement normal bilateral  Eyes: Pupils equal, symmetric and reactive to light.   Extra-ocular muscles intact  Respiratory: No stridor, stertor or increased work of breathing  ENT:  --Ear: The bilateral ears were examined under binocular microscopy  Right ear microscopic exam:  Pinna: Normal, no lesions or masses.  Mastoid: Nontender on palpation.   External auditory canal: Clear, no masses or lesions.  Tympanic membrane: Intact, no lesions, normal landmarks.  Middle ear: Aerated.    Left ear microscopic exam:  Pinna: Normal, no lesions or masses.  Mastoid: Nontender on palpation.   External auditory canal: Clear, no masses or lesions.  Tympanic membrane: Intact, no lesions, normal landmarks.  Middle ear: Aerated.        Latest Audiogram Result (Hz) Exam performed: 3/26/2025 3:24 PM Last edited by María Rogers Au.D on 3/26/2025 3:32 PM        125 250  1500 2000 3000 4000 6000 8000    Right air:  20 25  25  30 65 70 65 50    Left air:  20 25  30  40 65 60 65 55    Right mastoid bone:   15  20  25        Left mastoid bone:         65      Left mastoid bone (masked):     20  35           Reliability:  Fair    Transducer:  Inserts    Technique:  Conventional Audiometry    Comments:            Latest Speech Audiometry  Last edited by María Rogers Au.D on 3/26/2025 3:32 PM       Ear Method PTA SAT SRT [facility] Test/list Score (%) Intensity Mask/noise Notes    right live voice   30   10 By Difficulty 100 55      left live voice   35   10 By Difficulty 100 55                    Latest Tympanogram Result       Probe Tone (Hz): 226 Exam performed: 3/26/2025 3:25 PM Last edited by María Rogers Au.D on 3/26/2025 3:32 PM      Tympanograms  These were drawn by a user, not generated from device data      Right Ear Left Ear                     Right Ear Left Ear    Tympanogram type: Type A Type Ad    Canal volume (mL): 2.4 1.8    Peak pressure (daPa): 19 -47    Peak amplitude (mL): 1.74 3.78    Tympanogram width (daPa):        Comments:                    Latest Audiogram and Tympanogram  Result Text  Last edited by María Rogers Au.D on 3/27/2025  7:50 AM      Addendum      Patient has binaural hearing aids obtained through this clinic  He has not been seen since 2022  He drops off aids today for repair- see note     Otoscopic Inspection:  both ears: no cerumen and TM visualized    Summary  SNHL bilaterally.      With this degree of hearing loss, patient would have difficulty hearing in most situations, and would not be able to hear most average conversational speech.        Follow up with Ambrocio Underwood M.D..  Audiological monitoring as needed during the course of medical management.             Addended by María Rogers Au.D on 3/27/2025  7:50 AM               ASSESSMENT/PLAN:  Maicol Sellers is a 54 year old male with     ICD-10-CM   1. Tinnitus of both ears  H93.13   2. Sensorineural hearing loss (SNHL), bilateral  H90.3      Assessment & Plan  Noise-induced hearing loss, bilateral SNHL  Chronic noise-induced hearing loss due to past occupational exposure. Audiometry consistent with previous results, no significant progression.  - Repair hearing aids.  - Provide copies of previous and current audiometry results to him.    Tinnitus  Chronic tinnitus related to noise-induced hearing loss, worsened by stress and anxiety, affecting sleep.  - Recommend using a sound machine without light for improved sleep quality.  - Advise keeping the sound machine at least six feet away and using a decibel meter to ensure safe volume levels.  - Discuss potential benefit of magnesium supplements.  - Suggest evaluation with an audiologist specializing in tinnitus - Nelly THOMASON  - Consider cognitive behavioral therapy for tinnitus.    History of Eustachian tube dysfunction - no evidence of ETD today  Eustachian tube dysfunction well-managed, no current issues. Balloon dilation a future option if symptoms recur.  - Consider eustachian tube balloon dilation if symptoms recur.    Anxiety  Anxiety,  particularly nocturnal, possibly related to tinnitus. Hesitant to use anxiolytics or hypnotics.  - Discuss anxiety management with primary care physician.  - Consider therapy or counseling, to address anxiety.    -Follow-up in 3 months to re-evaluate tinnitus symptoms    Situation reviewed with the patient in detail.    Ambrocio Underwood MD  Otology/Otolaryngology  [facility]   [address]  [place] [address]  Phone [phone]  Fax [phone]        [1]   Current Outpatient Medications on File Prior to Visit   Medication Sig Dispense Refill    Omeprazole 40 MG Oral Capsule Delayed Release Take 1 capsule (40 mg total) by mouth before breakfast. 90 capsule 3    lisinopril 10 MG Oral Tab Take 1 tablet (10 mg total) by mouth daily. 30 tablet 1    fluticasone propionate 50 MCG/ACT Nasal Suspension 2 sprays by Each Nare route daily. 1 each 1    Sildenafil Citrate 50 MG Oral Tab Take 1 tablet (50 mg total) by mouth daily as needed for Erectile Dysfunction. 10 tablet 0    albuterol (PROAIR HFA) 108 (90 Base) MCG/ACT Inhalation Aero Soln Inhale 2 puffs into the lungs every 6 (six) hours as needed for Wheezing. 1 each 3    Fenofibrate 150 MG Oral Cap Take 1 capsule by mouth daily. 30 capsule 11    lisinopril 30 MG Oral Tab Take 1 tablet (30 mg total) by mouth daily. 30 tablet 11    fluticasone-salmeterol (WIXELA INHUB) 250-50 MCG/ACT Inhalation Aerosol Powder, Breath Activated Inhale 1 puff into the lungs every 12 (twelve) hours. 1 each 3    ALPRAZolam 0.5 MG Oral Tab Take 1 tablet (0.5 mg total) by mouth 2 (two) times daily as needed. (Patient not taking: Reported on 1/30/2025) 30 tablet 0     No current facility-administered medications on file prior to visit.   [2] No Known Allergies

## 2025-04-14 ENCOUNTER — TELEPHONE (OUTPATIENT)
Dept: AUDIOLOGY | Facility: CLINIC | Age: 55
End: 2025-04-14

## 2025-04-14 NOTE — TELEPHONE ENCOUNTER
Both HAid repair received back in office.  Packing slip detailed device replacement under standard warranty.  Programmed to previous user settings.  Listening check good with no concerns.      Called pt & notified that HAid repair ready for pickup at his convenience.  Pt to call with any further questions/concerns that arise.

## 2025-05-22 NOTE — TELEPHONE ENCOUNTER
Please review. Refill failed protocol.     discontinued on 5/2/2024 by Jennifer Sawyer for the following reason: Patient discontinued.     Safety concern: Lisinopril is listed multiple times on active medication list with different strengths.

## 2025-05-23 RX ORDER — LISINOPRIL 40 MG/1
40 TABLET ORAL DAILY
Qty: 90 TABLET | Refills: 1 | Status: SHIPPED | OUTPATIENT
Start: 2025-05-23

## 2025-05-30 RX ORDER — SILDENAFIL 50 MG/1
50 TABLET, FILM COATED ORAL
Qty: 10 TABLET | Refills: 0 | Status: SHIPPED | OUTPATIENT
Start: 2025-05-30

## 2025-05-30 NOTE — TELEPHONE ENCOUNTER
Current Outpatient Medications:       Sildenafil Citrate 50 MG Oral Tab, Take 1 tablet (50 mg total) by mouth daily as needed for Erectile Dysfunction., Disp: 10 tablet, Rfl: 0

## 2025-06-16 ENCOUNTER — OFFICE VISIT (OUTPATIENT)
Dept: INTERNAL MEDICINE CLINIC | Facility: CLINIC | Age: 55
End: 2025-06-16

## 2025-06-16 ENCOUNTER — NURSE TRIAGE (OUTPATIENT)
Dept: FAMILY MEDICINE CLINIC | Facility: CLINIC | Age: 55
End: 2025-06-16

## 2025-06-16 VITALS
DIASTOLIC BLOOD PRESSURE: 77 MMHG | BODY MASS INDEX: 31.39 KG/M2 | HEART RATE: 59 BPM | RESPIRATION RATE: 16 BRPM | TEMPERATURE: 97 F | OXYGEN SATURATION: 96 % | WEIGHT: 224.19 LBS | SYSTOLIC BLOOD PRESSURE: 125 MMHG | HEIGHT: 71 IN

## 2025-06-16 DIAGNOSIS — Z12.5 SCREENING FOR PROSTATE CANCER: ICD-10-CM

## 2025-06-16 DIAGNOSIS — R53.83 FATIGUE, UNSPECIFIED TYPE: Primary | ICD-10-CM

## 2025-06-16 DIAGNOSIS — R35.0 FREQUENT URINATION: ICD-10-CM

## 2025-06-16 PROCEDURE — 3074F SYST BP LT 130 MM HG: CPT | Performed by: NURSE PRACTITIONER

## 2025-06-16 PROCEDURE — 3008F BODY MASS INDEX DOCD: CPT | Performed by: NURSE PRACTITIONER

## 2025-06-16 PROCEDURE — 3078F DIAST BP <80 MM HG: CPT | Performed by: NURSE PRACTITIONER

## 2025-06-16 PROCEDURE — 99214 OFFICE O/P EST MOD 30 MIN: CPT | Performed by: NURSE PRACTITIONER

## 2025-06-16 NOTE — PROGRESS NOTES
HPI:        The following individual(s) verbally consented to be recorded using ambient AI listening technology and understand that they can each withdraw their consent to this listening technology at any point by asking the clinician to turn off or pause the recording:    Patient name: Maicol Sellers    Patient's spouse called with patient. Patient has been having high blood pressure and having fatigue for past 2 days today blood pressure is 152/90. No headache, shortness of breath or chest pain.      Patient scheduled for an appointment today with EDWIN Moya       Patient ID: Maicol Sellers is a 54 year old male.    HPI  Blood pressure 125/77, pulse 59, temperature 96.8 °F (36 °C), temperature source Temporal, resp. rate 16, height 5' 11\" (1.803 m), weight 224 lb 3.2 oz (101.7 kg), SpO2 96%.   History of Present Illness  Maicol Sellers is a 54 year old male who presents with fatigue and frequent urination.    He has been experiencing significant fatigue over the past few weeks, feeling extremely tired to the point of needing to pull over while driving to rest. He falls asleep easily and wakes up still feeling tired, despite sleeping for extended periods. He also reports increased urination, particularly at night, and a dry mouth. He is concerned about these symptoms, especially since he has coworkers who are diabetic and have experienced similar symptoms. No pain or blood in his urine. He reports heat and cold intolerance, poor sleep quality, and occasional depression.    He has been experiencing headaches for the past three weeks, which is unusual for him as he rarely gets headaches. The headaches are located on the sides of his head. He also reports intermittent blurry vision, for which he has been prescribed eye drops by his eye doctor. No chills, fever, sore throat, trouble swallowing, eye problems other than blurry vision, chest pain, shortness of breath, leg swelling, palpitations, abdominal pain,  constipation, diarrhea, nausea, vomiting, rash, or easy bruising or bleeding.    He experiences ear pressure in the left ear, but no pain. He also reports a scratchy voice and occasional cough. No chest tightness, but he describes a crampy sensation in his ribs that occurs on both sides. He reports nausea but denies any current nausea at the time of the visit. No abdominal pain, constipation, diarrhea, or vomiting.    He has a history of a non-STEMI heart attack in 2007 or 2008. He is currently on fenofibrate for triglycerides but does not take any other cholesterol medications. He smokes a pack of cigarettes a day and uses cannabis frequently to help with sleep. He reports back pain, which he attributes to recent yard work, and new knee pain. No leg swelling or palpitations.    His sister had thyroid issues at age 30. He works as a 's . He reports environmental allergies to pollen and cats, and stress related to life circumstances.       Immunization History   Administered Date(s) Administered    Covid-19 Vaccine Pfizer 30 mcg/0.3 ml 03/19/2021, 04/09/2021, 01/12/2022    Covid-19 Vaccine Pfizer Boni-Sucrose 30 mcg/0.3 ml 01/12/2022    FLUZONE 6 months and older PFS 0.5 ml (33755) 12/04/2014    Influenza 12/11/2012, 09/28/2018   Deferred Date(s) Deferred    FLULAVAL 6 months & older 0.5 ml Prefilled syringe (75487) 11/22/2017       Past Medical History:    Anxiety    Anxiety state    Back problem    lower back fusion 2 years ago    Colitis    COPD (chronic obstructive pulmonary disease) (HCC)    Diverticulosis of large intestine    Esophageal reflux    Essential hypertension    Headache    per [employer]:  \"Headaches\"    High blood pressure    High cholesterol    Hyperlipidemia    MRSA infection    per [employer]:  \"MRSA skin infections; Management:  I and D, right (10/2011)\"      Past Surgical History:   Procedure Laterality Date    Back surgery      Colonoscopy N/A 9/22/2017    Procedure:  COLONOSCOPY;  Surgeon: Da Jacobs MD;  Location: [facility] ENDOSCOPY    Colonoscopy N/A 2023    Procedure: COLONOSCOPY;  Surgeon: Da Jacobs MD;  Location: [facility] ENDOSCOPY    Electrocardiogram, complete  2012    Scanned to Media Tab - Date of Service 2012    Myringotomy, laser-assisted      Other surgical history      cervical fusion     Spine surgery procedure unlisted      lumbar fusion    Unlisted proc, arthroscopy Right     per [employer]:  \"arthroscopy x3 necrotizing infection right knee\"      Social History     Socioeconomic History    Marital status:    Tobacco Use    Smoking status: Every Day     Current packs/day: 0.00     Average packs/day: 1 pack/day for 30.0 years (30.0 ttl pk-yrs)     Types: Cigarettes     Start date: 1987     Last attempt to quit: 2017     Years since quittin.5    Smokeless tobacco: Never   Vaping Use    Vaping status: Former   Substance and Sexual Activity    Alcohol use: Yes     Alcohol/week: 1.0 standard drink of alcohol     Types: 1 Cans of beer per week     Comment: 2 beers, weekly    Drug use: Yes     Types: Cannabis   Other Topics Concern    Caffeine Concern Yes     Comment: coffee, 3 cups daily    Exercise Yes     Comment: walking          Review of Systems   Constitutional:  Positive for fatigue. Negative for chills and fever.   HENT:  Positive for ear pain. Negative for hearing loss, sinus pressure, sinus pain, sore throat, trouble swallowing and voice change.    Eyes:  Negative for pain and visual disturbance.        Blurry vision   Respiratory:  Positive for cough and shortness of breath. Negative for chest tightness.    Cardiovascular:  Negative for chest pain, palpitations and leg swelling.   Gastrointestinal:  Positive for nausea. Negative for abdominal pain, constipation, diarrhea and vomiting.   Endocrine: Positive for cold intolerance and heat intolerance.   Genitourinary:  Negative for dysuria and hematuria.    Musculoskeletal:  Positive for back pain. Negative for joint swelling.   Skin:  Negative for rash.   Allergic/Immunologic: Positive for environmental allergies.   Neurological:  Positive for headaches. Negative for weakness and numbness.   Hematological:  Does not bruise/bleed easily.   Psychiatric/Behavioral:  Positive for dysphoric mood. Negative for sleep disturbance. The patient is not nervous/anxious.               Current Outpatient Medications   Medication Sig Dispense Refill    Sildenafil Citrate 50 MG Oral Tab Take 1 tablet (50 mg total) by mouth daily as needed for Erectile Dysfunction. 10 tablet 0    lisinopril 40 MG Oral Tab Take 1 tablet (40 mg total) by mouth daily. 90 tablet 1    Omeprazole 40 MG Oral Capsule Delayed Release Take 1 capsule (40 mg total) by mouth before breakfast. 90 capsule 3    fluticasone propionate 50 MCG/ACT Nasal Suspension 2 sprays by Each Nare route daily. 1 each 1    albuterol (PROAIR HFA) 108 (90 Base) MCG/ACT Inhalation Aero Soln Inhale 2 puffs into the lungs every 6 (six) hours as needed for Wheezing. 1 each 3    Fenofibrate 150 MG Oral Cap Take 1 capsule by mouth daily. 30 capsule 11    fluticasone-salmeterol (WIXELA INHUB) 250-50 MCG/ACT Inhalation Aerosol Powder, Breath Activated Inhale 1 puff into the lungs every 12 (twelve) hours. 1 each 3    lisinopril 10 MG Oral Tab Take 1 tablet (10 mg total) by mouth daily. (Patient not taking: Reported on 6/16/2025) 30 tablet 1    lisinopril 30 MG Oral Tab Take 1 tablet (30 mg total) by mouth daily. (Patient not taking: Reported on 6/16/2025) 30 tablet 11    ALPRAZolam 0.5 MG Oral Tab Take 1 tablet (0.5 mg total) by mouth 2 (two) times daily as needed. (Patient not taking: Reported on 1/30/2025) 30 tablet 0     Allergies:No Known Allergies   PHYSICAL EXAM:   Physical Exam  /77 (BP Location: Left arm, Patient Position: Sitting, Cuff Size: large)   Pulse 59   Temp 96.8 °F (36 °C) (Temporal)   Resp 16   Ht 5' 11\" (1.803  m)   Wt 224 lb 3.2 oz (101.7 kg)   SpO2 96%   BMI 31.27 kg/m²   Wt Readings from Last 2 Encounters:   06/16/25 224 lb 3.2 oz (101.7 kg)   01/30/25 226 lb (102.5 kg)     Body mass index is 31.27 kg/m².(2)  Lab Results   Component Value Date    WBC 11.5 (H) 01/28/2025    RBC 4.90 01/28/2025    HGB 14.2 01/28/2025    HCT 41.2 01/28/2025    MCV 84.1 01/28/2025    MCH 29.0 01/28/2025    MCHC 34.5 01/28/2025    RDW 13.0 01/28/2025    .0 01/28/2025    MPV 10.3 12/19/2017      Lab Results   Component Value Date    GLU 99 01/28/2025    BUN 20 01/28/2025    BUNCREA 19.4 01/28/2025    CREATSERUM 1.03 01/28/2025    ANIONGAP 9 01/28/2025    GFR >60 04/21/2016    GFRNAA 88 01/19/2022    GFRAA 102 01/19/2022    CA 9.5 01/28/2025    OSMOCALC 291 01/28/2025    ALKPHO 48 01/28/2025    AST 12 01/28/2025    ALT 16 01/28/2025    ALKPHOS 58 05/20/2015    BILT 0.5 01/28/2025    TP 7.0 01/28/2025    ALB 4.7 01/28/2025    GLOBULIN 2.2 07/14/2024    AGRATIO 1.6 05/20/2015     01/28/2025    K 4.0 01/28/2025     01/28/2025    CO2 22.0 01/28/2025      No results found for: \"EAG\", \"A1C\"   Lab Results   Component Value Date    CHOLEST 173 04/11/2024    TRIG 157 (H) 04/11/2024    HDL 40 04/11/2024     (H) 04/11/2024    VLDL 27 04/11/2024    NONHDLC 133 (H) 04/11/2024    CALCNONHDL 146 (H) 10/06/2015      Lab Results   Component Value Date    TSH 1.50 02/03/2014                ASSESSMENT/PLAN:     Assessment & Plan  Fatigue, unspecified type    Orders:    Comp Metabolic Panel (14); Future    Lipid Panel; Future    TSH W Reflex To Free T4; Future    CBC, NO DIFFERENTIAL/PLATELET; Future    Hemoglobin A1C [E]; Future    CT LUNG LD SCREENING(CPT=71271); Future    Frequent urination    Orders:    Urinalysis with Culture Reflex; Future      No orders of the defined types were placed in this encounter.      Assessment & Plan  Fatigue  Differential includes diabetes mellitus, thyroid dysfunction, and viral infection. Symptoms  suggestive of diabetes.-Fatigue and frequent urination.  - Order hemoglobin A1c.  - Order thyroid function tests.  - Order CBC.  - Order CMP.  - Order urine test.    Headache  Tension-type headaches, possibly stress-related.    Myocardial Infarction  NSTEMI in 2007. CT calcium score indicated potential blockage. On fenofibrate, not on statin. Discussed cholesterol monitoring.  - Order lipid panel.    Tobacco Use Disorder  Smokes one pack per day. Advised reduction to decrease cardiovascular and pulmonary risk.  - Advise to reduce smoking to half a pack per day by removing ten cigarettes each morning.    Cannabis Use  Frequent use for insomnia. May contribute to fatigue.    General Health Maintenance  Lung scan in 2024 showed no lung cancer. Advised annual scan. PSA normal last April. Due for thyroid function test.  - Order annual lung scan.  - Order PSA screening.    Follow-up  Scheduled for further evaluation and discussion of lab results.  - Follow up tomorrow for further evaluation and discussion of lab results. He has an appointment with his PCP Dr. Moyer.       Meds This Visit:  Requested Prescriptions      No prescriptions requested or ordered in this encounter       Imaging & Referrals:  None         EDWIN Stallworth

## 2025-06-16 NOTE — TELEPHONE ENCOUNTER
Action Requested: Summary for Provider     []  Critical Lab, Recommendations Needed  [] Need Additional Advice  [x]   FYI    []   Need Orders  [] Need Medications Sent to Pharmacy  []  Other     SUMMARY: Patient's spouse called with patient. Patient has been having high blood pressure and having fatigue for past 2 days today blood pressure is 152/90. No headache, shortness of breath or chest pain.     Patient scheduled for an appointment today with EDWIN Moya     Patient advised to go to ER with any worsening symptoms.     Reason for call: Blood Pressure  Onset: worsening in past 2 days.  Reason for Disposition   Patient wants to be seen    Protocols used: Blood Pressure - High-A-OH

## 2025-06-17 ENCOUNTER — LAB ENCOUNTER (OUTPATIENT)
Dept: LAB | Age: 55
End: 2025-06-17
Attending: NURSE PRACTITIONER
Payer: COMMERCIAL

## 2025-06-17 ENCOUNTER — OFFICE VISIT (OUTPATIENT)
Dept: FAMILY MEDICINE CLINIC | Facility: CLINIC | Age: 55
End: 2025-06-17
Payer: COMMERCIAL

## 2025-06-17 VITALS
DIASTOLIC BLOOD PRESSURE: 74 MMHG | BODY MASS INDEX: 31.08 KG/M2 | RESPIRATION RATE: 18 BRPM | OXYGEN SATURATION: 95 % | SYSTOLIC BLOOD PRESSURE: 143 MMHG | WEIGHT: 222 LBS | HEIGHT: 71 IN | HEART RATE: 69 BPM | TEMPERATURE: 98 F

## 2025-06-17 DIAGNOSIS — R53.83 FATIGUE, UNSPECIFIED TYPE: ICD-10-CM

## 2025-06-17 DIAGNOSIS — I10 ESSENTIAL HYPERTENSION: ICD-10-CM

## 2025-06-17 DIAGNOSIS — Z12.5 SCREENING FOR PROSTATE CANCER: ICD-10-CM

## 2025-06-17 DIAGNOSIS — Z72.0 TOBACCO USE: ICD-10-CM

## 2025-06-17 DIAGNOSIS — R53.83 OTHER FATIGUE: ICD-10-CM

## 2025-06-17 DIAGNOSIS — N40.1 BENIGN PROSTATIC HYPERPLASIA WITH LOWER URINARY TRACT SYMPTOMS, SYMPTOM DETAILS UNSPECIFIED: ICD-10-CM

## 2025-06-17 DIAGNOSIS — G47.9 SLEEP DISTURBANCE: ICD-10-CM

## 2025-06-17 DIAGNOSIS — J43.2 CENTRILOBULAR EMPHYSEMA (HCC): ICD-10-CM

## 2025-06-17 DIAGNOSIS — Z00.00 ROUTINE GENERAL MEDICAL EXAMINATION AT A HEALTH CARE FACILITY: Primary | ICD-10-CM

## 2025-06-17 DIAGNOSIS — I10 PRIMARY HYPERTENSION: ICD-10-CM

## 2025-06-17 DIAGNOSIS — R35.0 FREQUENT URINATION: ICD-10-CM

## 2025-06-17 DIAGNOSIS — F41.9 ANXIETY: ICD-10-CM

## 2025-06-17 LAB
ALBUMIN SERPL-MCNC: 4.8 G/DL (ref 3.2–4.8)
ALBUMIN/GLOB SERPL: 2.3 {RATIO} (ref 1–2)
ALP LIVER SERPL-CCNC: 45 U/L (ref 45–117)
ALT SERPL-CCNC: 21 U/L (ref 10–49)
ANION GAP SERPL CALC-SCNC: 6 MMOL/L (ref 0–18)
AST SERPL-CCNC: 21 U/L (ref ?–34)
BILIRUB SERPL-MCNC: 0.3 MG/DL (ref 0.3–1.2)
BILIRUB UR QL: NEGATIVE
BUN BLD-MCNC: 14 MG/DL (ref 9–23)
BUN/CREAT SERPL: 13.2 (ref 10–20)
CALCIUM BLD-MCNC: 9.9 MG/DL (ref 8.7–10.4)
CHLORIDE SERPL-SCNC: 110 MMOL/L (ref 98–112)
CHOLEST SERPL-MCNC: 184 MG/DL (ref ?–200)
CLARITY UR: CLEAR
CO2 SERPL-SCNC: 26 MMOL/L (ref 21–32)
COMPLEXED PSA SERPL-MCNC: 0.35 NG/ML (ref ?–4)
CREAT BLD-MCNC: 1.06 MG/DL (ref 0.7–1.3)
DEPRECATED RDW RBC AUTO: 39.3 FL (ref 35.1–46.3)
EGFRCR SERPLBLD CKD-EPI 2021: 83 ML/MIN/1.73M2 (ref 60–?)
ERYTHROCYTE [DISTWIDTH] IN BLOOD BY AUTOMATED COUNT: 12.7 % (ref 11–15)
EST. AVERAGE GLUCOSE BLD GHB EST-MCNC: 128 MG/DL (ref 68–126)
FASTING PATIENT LIPID ANSWER: YES
FASTING STATUS PATIENT QL REPORTED: YES
GLOBULIN PLAS-MCNC: 2.1 G/DL (ref 2–3.5)
GLUCOSE BLD-MCNC: 105 MG/DL (ref 70–99)
GLUCOSE UR-MCNC: NORMAL MG/DL
HBA1C MFR BLD: 6.1 % (ref ?–5.7)
HCT VFR BLD AUTO: 41.7 % (ref 39–53)
HDLC SERPL-MCNC: 35 MG/DL (ref 40–59)
HGB BLD-MCNC: 14 G/DL (ref 13–17.5)
HGB UR QL STRIP.AUTO: NEGATIVE
KETONES UR-MCNC: NEGATIVE MG/DL
LDLC SERPL CALC-MCNC: 118 MG/DL (ref ?–100)
LEUKOCYTE ESTERASE UR QL STRIP.AUTO: NEGATIVE
MCH RBC QN AUTO: 28.7 PG (ref 26–34)
MCHC RBC AUTO-ENTMCNC: 33.6 G/DL (ref 31–37)
MCV RBC AUTO: 85.6 FL (ref 80–100)
NITRITE UR QL STRIP.AUTO: NEGATIVE
NONHDLC SERPL-MCNC: 149 MG/DL (ref ?–130)
OSMOLALITY SERPL CALC.SUM OF ELEC: 295 MOSM/KG (ref 275–295)
PH UR: 5.5 [PH] (ref 5–8)
PLATELET # BLD AUTO: 221 10(3)UL (ref 150–450)
POTASSIUM SERPL-SCNC: 4.6 MMOL/L (ref 3.5–5.1)
PROT SERPL-MCNC: 6.9 G/DL (ref 5.7–8.2)
PROT UR-MCNC: NEGATIVE MG/DL
RBC # BLD AUTO: 4.87 X10(6)UL (ref 4.3–5.7)
SODIUM SERPL-SCNC: 142 MMOL/L (ref 136–145)
SP GR UR STRIP: 1.02 (ref 1–1.03)
TRIGL SERPL-MCNC: 172 MG/DL (ref 30–149)
TSI SER-ACNC: 0.65 UIU/ML (ref 0.55–4.78)
UROBILINOGEN UR STRIP-ACNC: NORMAL
VLDLC SERPL CALC-MCNC: 30 MG/DL (ref 0–30)
WBC # BLD AUTO: 6.9 X10(3) UL (ref 4–11)

## 2025-06-17 PROCEDURE — 83036 HEMOGLOBIN GLYCOSYLATED A1C: CPT

## 2025-06-17 PROCEDURE — 80061 LIPID PANEL: CPT

## 2025-06-17 PROCEDURE — 84443 ASSAY THYROID STIM HORMONE: CPT

## 2025-06-17 PROCEDURE — 36415 COLL VENOUS BLD VENIPUNCTURE: CPT

## 2025-06-17 PROCEDURE — 80053 COMPREHEN METABOLIC PANEL: CPT

## 2025-06-17 PROCEDURE — 81003 URINALYSIS AUTO W/O SCOPE: CPT

## 2025-06-17 PROCEDURE — 85027 COMPLETE CBC AUTOMATED: CPT

## 2025-06-17 RX ORDER — ALFUZOSIN HYDROCHLORIDE 10 MG/1
10 TABLET, EXTENDED RELEASE ORAL DAILY
Qty: 30 TABLET | Refills: 2 | Status: SHIPPED | OUTPATIENT
Start: 2025-06-17

## 2025-06-17 RX ORDER — FENOFIBRATE 150 MG/1
1 CAPSULE ORAL DAILY
Qty: 30 CAPSULE | Refills: 11 | Status: SHIPPED | OUTPATIENT
Start: 2025-06-17

## 2025-06-17 NOTE — PROGRESS NOTES
Subjective:   Maicol Sellers is a 54 year old male who presents for Lab Results (Discuss lab results ) and Fatigue (Tired all the time )       History/Other:    Chief Complaint Reviewed and Verified  Nursing Notes Reviewed and   Verified  Allergies Reviewed  Medications Reviewed  Problem List   Reviewed         Tobacco:  Tobacco Use[1]  E-Cigarettes/Vaping       Questions Responses    E-Cigarette Use Former User          E-Cigarette/Vaping Substances       Questions Responses    Nicotine No    THC No    CBD No    Flavoring No          E-Cigarette/Vaping Devices       Questions Responses    Disposable No    Pre-filled or Refillable Cartridge No    Refillable Tank No    Pre-filled Pod No        .      Current Medications[2]      Review of Systems:  Review of Systems   Constitutional:  Positive for fatigue.   HENT: Negative.     Eyes: Negative.    Respiratory: Negative.     Cardiovascular: Negative.    Gastrointestinal: Negative.    Endocrine: Negative for polydipsia, polyphagia and polyuria.   Genitourinary:  Positive for frequency.   Musculoskeletal: Negative.    Skin: Negative.         No mole changes   Allergic/Immunologic: Negative for environmental allergies.   Neurological:  Negative for dizziness, weakness, numbness and headaches.   Hematological: Negative.    Psychiatric/Behavioral:  Positive for sleep disturbance.         No anxiety or depressed feelings         Objective:   /74   Pulse 69   Temp 98.4 °F (36.9 °C) (Temporal)   Resp 18   Ht 5' 11\" (1.803 m)   Wt 222 lb (100.7 kg)   SpO2 95%   BMI 30.96 kg/m²  Estimated body mass index is 30.96 kg/m² as calculated from the following:    Height as of this encounter: 5' 11\" (1.803 m).    Weight as of this encounter: 222 lb (100.7 kg).  Physical Exam  Vitals reviewed.   Constitutional:       Appearance: Normal appearance. He is well-developed.   HENT:      Head: Normocephalic.      Right Ear: Tympanic membrane, ear canal and external ear normal.       Left Ear: Tympanic membrane, ear canal and external ear normal.      Nose: Nose normal.   Eyes:      General: Lids are normal.      Conjunctiva/sclera: Conjunctivae normal.      Pupils: Pupils are equal, round, and reactive to light.      Funduscopic exam:     Right eye: No hemorrhage or papilledema.         Left eye: No hemorrhage or papilledema.   Neck:      Vascular: Normal carotid pulses. No JVD.      Trachea: Trachea normal.   Cardiovascular:      Rate and Rhythm: Regular rhythm.      Pulses:           Carotid pulses are 2+ on the right side and 2+ on the left side.       Radial pulses are 2+ on the right side and 2+ on the left side.      Heart sounds: Normal heart sounds.   Pulmonary:      Breath sounds: Normal breath sounds.   Abdominal:      Tenderness: There is no abdominal tenderness.   Musculoskeletal:      Cervical back: Normal, normal range of motion and neck supple.      Thoracic back: Normal.      Lumbar back: Normal.   Lymphadenopathy:      Cervical: No cervical adenopathy.   Skin:     Comments: No suspicious lesions waist up exam   Neurological:      General: No focal deficit present.      Mental Status: He is alert and oriented to person, place, and time.      Sensory: No sensory deficit.      Deep Tendon Reflexes: Reflexes are normal and symmetric.   Psychiatric:         Mood and Affect: Mood normal. Mood is not anxious or depressed.           Assessment & Plan:   1. Routine general medical examination at a health care facility (Primary)  Labs reviewed    2. Essential hypertension  Stable CPM    3. Tobacco use  Same    4. Anxiety  Stable. Same. Use of med as needed  Work stress.     6. Centrilobular emphysema (HCC)  Stable. Fatigue elevated - encouraged to use med every day even if no symptoms.    7. Benign prostatic hyperplasia with lower urinary tract symptoms, symptom details unspecified  Start medication due to elevated nocturia.  Could be cause of his fatigue.     8. Sleep  disturbance  Return in on [facility] .  If not better then will due sleep study    9. Other fatigue  Labs stable. Sleep study if not better.   Other orders  -     Alfuzosin HCl ER; Take 1 tablet (10 mg total) by mouth daily.  Dispense: 30 tablet; Refill: 2  -     Fenofibrate; Take 1 capsule by mouth daily.  Dispense: 30 capsule; Refill: 11        Return in about 1 month (around 2025).    Les Moyer DO, 2025, 4:07 PM        [1]   Social History  Tobacco Use   Smoking Status Every Day    Current packs/day: 0.00    Average packs/day: 1 pack/day for 30.0 years (30.0 ttl pk-yrs)    Types: Cigarettes    Start date: 1987    Last attempt to quit: 2017    Years since quittin.5   Smokeless Tobacco Never   [2]   Current Outpatient Medications   Medication Sig Dispense Refill    alfuzosin ER 10 MG Oral Tablet 24 Hr Take 1 tablet (10 mg total) by mouth daily. 30 tablet 2    Fenofibrate 150 MG Oral Cap Take 1 capsule by mouth daily. 30 capsule 11    Sildenafil Citrate 50 MG Oral Tab Take 1 tablet (50 mg total) by mouth daily as needed for Erectile Dysfunction. 10 tablet 0    lisinopril 40 MG Oral Tab Take 1 tablet (40 mg total) by mouth daily. 90 tablet 1    Omeprazole 40 MG Oral Capsule Delayed Release Take 1 capsule (40 mg total) by mouth before breakfast. 90 capsule 3    fluticasone propionate 50 MCG/ACT Nasal Suspension 2 sprays by Each Nare route daily. 1 each 1    albuterol (PROAIR HFA) 108 (90 Base) MCG/ACT Inhalation Aero Soln Inhale 2 puffs into the lungs every 6 (six) hours as needed for Wheezing. 1 each 3    fluticasone-salmeterol (WIXELA INHUB) 250-50 MCG/ACT Inhalation Aerosol Powder, Breath Activated Inhale 1 puff into the lungs every 12 (twelve) hours. 1 each 3

## 2025-07-11 RX ORDER — FLUTICASONE PROPIONATE AND SALMETEROL 250; 50 UG/1; UG/1
1 POWDER RESPIRATORY (INHALATION) EVERY 12 HOURS SCHEDULED
Qty: 180 EACH | Refills: 3 | Status: SHIPPED | OUTPATIENT
Start: 2025-07-11

## 2025-07-21 RX ORDER — LISINOPRIL 10 MG/1
10 TABLET ORAL DAILY
Qty: 30 TABLET | Refills: 1 | OUTPATIENT
Start: 2025-07-21

## 2025-07-21 NOTE — TELEPHONE ENCOUNTER
Dose increased to Lisinopril 40 mg. 6 month supply of refills was sent on 05/23/2025      Outpatient Medication Detail     Disp Refills Start End    lisinopril 40 MG Oral Tab 90 tablet 1 5/23/2025 --    Sig - Route: Take 1 tablet (40 mg total) by mouth daily. - Oral    Sent to pharmacy as: Lisinopril 40 MG Oral Tablet (Prinivil; Zestril)    E-Prescribing Status: Receipt confirmed by pharmacy (5/23/2025  5:11 PM CDT)      Pharmacy    [id number] [place] [address] AT [facility], [phone], [phone]

## (undated) DIAGNOSIS — M96.1 POSTLAMINECTOMY SYNDROME OF LUMBAR REGION: Primary | ICD-10-CM

## (undated) DEVICE — DRAPE SRG 150X54IN LEICA

## (undated) DEVICE — MEDI-VAC NON-CONDUCTIVE SUCTION TUBING 6MM X 1.8M (6FT.) L: Brand: CARDINAL HEALTH

## (undated) DEVICE — DERMABOND LIQUID ADHESIVE

## (undated) DEVICE — ENDOSCOPY PACK - LOWER: Brand: MEDLINE INDUSTRIES, INC.

## (undated) DEVICE — TRAP 4 CPTR CHMBR N EZ INLN

## (undated) DEVICE — 60 ML SYRINGE REGULAR TIP: Brand: MONOJECT

## (undated) DEVICE — FORCEP RADIAL JAW 4

## (undated) DEVICE — SNARE CAPTIFLEX MICRO-OVL OLY

## (undated) DEVICE — Device: Brand: DEFENDO AIR/WATER/SUCTION AND BIOPSY VALVE

## (undated) DEVICE — 3.0MM PRECISION NEURO (MATCH HEAD)

## (undated) DEVICE — FORCEPS BP BLU 7.75IN CSHG 2MM

## (undated) DEVICE — BAG SRG CLR 36X30IN

## (undated) DEVICE — Device: Brand: DUAL NARE NASAL CANNULAE FEMALE LUER CON 7FT O2 TUBE

## (undated) DEVICE — CLIPPER BLADE 3M

## (undated) DEVICE — UNDYED BRAIDED (POLYGLACTIN 910), SYNTHETIC ABSORBABLE SUTURE: Brand: COATED VICRYL

## (undated) DEVICE — SOL  .9 1000ML BTL

## (undated) DEVICE — CERVICAL CDS: Brand: MEDLINE INDUSTRIES, INC.

## (undated) DEVICE — KIT ENDO ORCAPOD 160/180/190

## (undated) DEVICE — THE FLOSEAL MALLEABLE TIP AND TRIMMABLE TIP ARE INTENDED FOR DELIVERY OF FLOSEAL HEMOSTATIC MATRIX.: Brand: FLOSEAL SPECIAL APPLICATOR TIPS

## (undated) DEVICE — COLLAR CRV ADLT TLL 3.75IN

## (undated) DEVICE — DRAPE SRG 120INX104INX76IN 124

## (undated) DEVICE — ENDOSCOPY PACK UPPER: Brand: MEDLINE INDUSTRIES, INC.

## (undated) DEVICE — STERILE POLYISOPRENE POWDER-FREE SURGICAL GLOVES: Brand: PROTEXIS

## (undated) DEVICE — PEN: MARKING STD PT 100/CS: Brand: MEDICAL ACTION INDUSTRIES

## (undated) DEVICE — STERILE LATEX POWDER-FREE SURGICAL GLOVESWITH NITRILE COATING: Brand: PROTEXIS

## (undated) DEVICE — KIT CLEAN ENDOKIT 1.1OZ GOWNX2

## (undated) DEVICE — COLLAR CRV ADLT REG CHIN REST

## (undated) DEVICE — FORCEPS BX L240CM DIA2.4MM L NDL RAD JAW 4

## (undated) DEVICE — SUTURE VICRYL 3-0 RB-1

## (undated) NOTE — LETTER
Date & Time: 11/9/2020, 6:15 AM  Patient: Meño Howell  Encounter Provider(s):    Jeanne Shepherd MD       To Whom It May Concern:    Madhu Arguetajames was seen and treated in our department on 11/9/2020. He should not return to work until 11/10/2020.     If

## (undated) NOTE — ED AVS SNAPSHOT
[facility] Emergency Department    [place] [address]     [place] [address]    Phone:  [phone]    Fax:  [address] [facility]   MRN: [id number]    Department:  [facility] Emergency Department   Date of Visit:  3/3/2 covered by your plan. Please contact your insurance company to determine coverage and benefits available for follow-up care and referrals.       If you have difficulty scheduling your follow-up appointment as directed, please call our  at [phone]) If you believe that any of the medications or instructions on this list is different from what your Primary Care doctor has instructed you - please continue to take your medications as instructed by your Primary Care doctor until you can check with your do coverage. Patient [address] is a Federal Navigator program that can help with your Affordable Care Act coverage, as well as all types of Medicaid plans.   To get signed up and covered, please call [phone] and ask to get set up for an insuran

## (undated) NOTE — ED AVS SNAPSHOT
[facility] Emergency Department    [place] [address]     [place] [address]    Phone:  [phone]    Fax:  [address] [facility]   MRN: [id number]    Department:  [facility] Emergency Department   Date of Visit:  3/3/2 and Class Registration line at [phone] or find a doctor online by visiting [email or web address].    IF THERE IS ANY CHANGE OR WORSENING OF YOUR CONDITION, CALL YOUR PRIMARY CARE PHYSICIAN AT ONCE OR RETURN IMMEDIATELY TO [address] [place] [address].     If

## (undated) NOTE — MR AVS SNAPSHOT
[facility] [place] [address] [place]  [id number] [phone]               Thank you for choosing us for your health care visit with Rl Bobby DO.   We are glad to serve you and happy to provide you with this summary of or be assured that none are required. You can then schedule your appointment. Failure to obtain required authorization numbers can create reimbursement difficulties for you.     Assoc Dx:  Colitis [K52.9]          Reason for Today's Visit     ER F/U EAT THESE FOODS MORE OFTEN: EAT THESE FOODS LESS OFTEN:   Make half your plate fruits and vegetables Highly refined, white starches including white bread, rice and pasta   Eat plenty of protein, keep the fat content low Sugars:  sodas and sports drinks, ca

## (undated) NOTE — Clinical Note
TCM outreach complete, Pt coming to see you on 11/27/17 for TCM apt. Pt c/o uncotrolled pain, on Prn Granby, call transferred to Dr Nayeli Pabon answering service and [employer] message routed to you as BEBETO.

## (undated) NOTE — LETTER
7/22/2022    Jane Cook        [address] [place] [address]            Dear Jane Cook,      Our records indicate that you are due for an appointment for a Colonoscopy in September 2022, or sometime there after, with Christa Frye MD. Our doctors are booking out about 3-5 months for procedures. Please call our office to schedule this appointment. Your medical well-being is important to us. If your insurance requires a referral, please call your primary care office to request one.       Thank you,      The Physicians and Staff at [facility]

## (undated) NOTE — ED AVS SNAPSHOT
Dimitri Calloway   MRN: [id number]    Department:  [facility] Emergency Department   Date of Visit:  9/20/2017           Disclosure     Insurance plans vary and the physician(s) referred by the ER may not be covered by your plan.  Please contact y CARE PHYSICIAN AT ONCE OR RETURN IMMEDIATELY TO THE EMERGENCY DEPARTMENT. If you have been prescribed any medication(s), please fill your prescription right away and begin taking the medication(s) as directed.   If you believe that any of the medications

## (undated) NOTE — LETTER
[address]   Date:   5/18/2022     Name:   Harley Colunga    YOB: 1970   MRN:   [id number]       WHERE IS YOUR PAIN NOW? Sesar the areas on your body where you feel the described sensations. Use the appropriate symbol. Teresa Sickle the areas of radiation. Include all affected areas. Just to complete the picture, please draw in the face. ACHE:  ^ ^ ^   NUMBNESS:  0000   PINS & NEEDLES:  = = = =                              ^ ^ ^                       0000              = = = =                                    ^ ^ ^                       0000            = = = =      BURNING:  XXXX   STABBING: ////                  XXXX                ////                         XXXX          ////     Please sesar the line below indicating your degree of pain right now  with 0 being no pain 10 being the worst pain possible.                                          0             1             2              3             4              5              6              7             8             9             10         Patient Signature:

## (undated) NOTE — Clinical Note
Dear Monika Gibbons,    Thank you for sending Inell Shakir to see me for physiatry consultation. I appreciate your confidence in me to care for your patients. Please feel free call me with any questions at [phone] or contact me through [facility]     Sincerely,  Mya Avendano MD  Board Certified, Physical Medicine and Rehabilitation  Board certified, [address]    CC: Dr. Frankey Pall

## (undated) NOTE — LETTER
[place]  [address]       10/04/18        Patient: Joseph Smith   YOB: 1970   Date of Visit: 10/4/2018       Dear  Dr. Sai Kapoor,      Thank you for referring Joseph Smith to my practice.   He scott

## (undated) NOTE — MR AVS SNAPSHOT
[facility] SPECIALTY [facility] - [place] [address] [address] [address] [phone]               Thank you for choosing us for your health care visit with Krystle Steele DO.   We are glad to serve you and happy to provide you with this summary of - [facility], [phone], [address] [place] [address], [address] [address]     Phone:  [phone]    - Clindamycin HCl 300 MG Caps            MyChart     Visit MyChart  You can access your MyChart t

## (undated) NOTE — LETTER
9/9/2022          To Whom It May Concern:    Gloria Jimenez is currently under my medical care and may not return to work at this time. Please excuse Elpidio Smith since 9/8/22 till next office visit next week. We will see him again next week, at which time we will re evaluate. If you require additional information please contact our office.         Sincerely,    Tyesha Sanchez MD

## (undated) NOTE — LETTER
[address], [facility]   Date:   3/23/2022     Name:   Sophia Toro    YOB: 1970   MRN:   [id number]       WHERE IS YOUR PAIN NOW? Eliazar the areas on your body where you feel the described sensations. Use the appropriate symbol. Spring Farah the areas of radiation. Include all affected areas. Just to complete the picture, please draw in the face. ACHE:  ^ ^ ^   NUMBNESS:  0000   PINS & NEEDLES:  = = = =                              ^ ^ ^                       0000              = = = =                                    ^ ^ ^                       0000            = = = =      BURNING:  XXXX   STABBING: ////                  XXXX                ////                         XXXX          ////     Please eliazar the line below indicating your degree of pain right now  with 0 being no pain 10 being the worst pain possible.                                          0             1             2              3             4              5              6              7             8             9             10         Patient Signature:

## (undated) NOTE — LETTER
[address] [place] [address] Rd, [place], [place]     AUTHORIZATION FOR SURGICAL OPERATION OR PROCEDURE    1.  I hereby authorize Dr. John Kendrick MD, my Physician(s) and whomever may be designated as the doctor's Assistant, to perform the fol my Physician. 5. I consent to the photographing of procedure(s) to be performed for the purposes of advancing medicine, science and/or education, provided my identity is not revealed.  If the procedure has been videotaped, the physician/surgeon will obtain signature)                                                                                                  (Date)                                (Time)  STATEMENT OF PHYSICIAN My signature below affirms that prior to the time of the procedure; I have expl

## (undated) NOTE — LETTER
[address] [facility]   Date:   3/1/2022     Name:   Marilu Culver    YOB: 1970   MRN:   [id number]       WHERE IS YOUR PAIN NOW? Sesar the areas on your body where you feel the described sensations. Use the appropriate symbol. Hina Helms the areas of radiation. Include all affected areas. Just to complete the picture, please draw in the face. ACHE:  ^ ^ ^   NUMBNESS:  0000   PINS & NEEDLES:  = = = =                              ^ ^ ^                       0000              = = = =                                    ^ ^ ^                       0000            = = = =      BURNING:  XXXX   STABBING: ////                  XXXX                ////                         XXXX          ////     Please sesar the line below indicating your degree of pain right now  with 0 being no pain 10 being the worst pain possible.                                          0             1             2              3             4              5              6              7             8             9             10         Patient Signature:

## (undated) NOTE — LETTER
5/6/2021              Donell Zamorano        [address] [place] [address]         To Whom it may concern: This is to certify that Donell Zamorano had an appointment on 5/6/2021 at 12:07 PM with Mae Ding DO.   Off work 5/6 through 5

## (undated) NOTE — LETTER
[address] [facility] [address] [place], [place]  Authorization for Surgical Operation and Procedure                                                                                           I hereby authorize Zahira Prince MD, my physician and his/her assistants (if applicable), which may include medical students, residents, and/or fellows, to perform the following surgical operation/ procedure and administer such anesthesia as may be determined necessary by my physician: Operation/Procedure name (s) COLONOSCOPY on Ange Acevedo   2. I recognize that during the surgical operation/procedure, unforeseen conditions may necessitate additional or different procedures than those listed above. I, therefore, further authorize and request that the above-named surgeon, assistants, or designees perform such procedures as are, in their judgment, necessary and desirable. 3.   My surgeon/physician has discussed prior to my surgery the potential benefits, risks and side effects of this procedure; the likelihood of achieving goals; and potential problems that might occur during recuperation. They also discussed reasonable alternatives to the procedure, including risks, benefits, and side effects related to the alternatives and risks related to not receiving this procedure. I have had all my questions answered and I acknowledge that no guarantee has been made as to the result that may be obtained. 4.   Should the need arise during my operation/procedure, which includes change of level of care prior to discharge, I also consent to the administration of blood and/or blood products. Further, I understand that despite careful testing and screening of blood or blood products by collecting agencies, I may still be subject to ill effects as a result of receiving a blood transfusion and/or blood products.   The following are some, but not all, of the potential risks that can occur: fever and allergic reactions, hemolytic reactions, transmission of diseases such as Hepatitis, AIDS and Cytomegalovirus (CMV) and fluid overload. In the event that I wish to have an autologous transfusion of my own blood, or a directed donor transfusion, I will discuss this with my physician. Check only if Refusing Blood or Blood Products  I understand refusal of blood or blood products as deemed necessary by my physician may have serious consequences to my condition to include possible death. I hereby assume responsibility for my refusal and release the hospital, its personnel, and my physicians from any responsibility for the consequences of my refusal.    o  Refuse   5. I authorize the use of any specimen, organs, tissues, body parts or foreign objects that may be removed from my body during the operation/procedure for diagnosis, research or teaching purposes and their subsequent disposal by hospital authorities. I also authorize the release of specimen test results and/or written reports to my treating physician on the hospital medical staff or other referring or consulting physicians involved in my care, at the discretion of the Pathologist or my treating physician. 6.   I consent to the photographing or videotaping of the operations or procedures to be performed, including appropriate portions of my body for medical, scientific, or educational purposes, provided my identity is not revealed by the pictures or by descriptive texts accompanying them. If the procedure has been photographed/videotaped, the surgeon will obtain the original picture, image, videotape or CD. The hospital will not be responsible for storage, release or maintenance of the picture, image, tape or CD.    7.   I consent to the presence of a  or observers in the operating room as deemed necessary by my physician or their designees.     8.   I recognize that in the event my procedure results in extended X-Ray/fluoroscopy time, I may develop a skin reaction. 9. If I have a Do Not Attempt Resuscitation (DNAR) order in place, that status will be suspended while in the operating room, procedural suite, and during the recovery period unless otherwise explicitly stated by me (or a person authorized to consent on my behalf). The surgeon or my attending physician will determine when the applicable recovery period ends for purposes of reinstating the DNAR order. 10. Patients having a sterilization procedure: I understand that if the procedure is successful the results will be permanent and it will therefore be impossible for me to inseminate, conceive, or bear children. I also understand that the procedure is intended to result in sterility, although the result has not been guaranteed. 11. I acknowledge that my physician has explained sedation/analgesia administration to me including the risk and benefits I consent to the administration of sedation/analgesia as may be necessary or desirable in the judgment of my physician. I CERTIFY THAT I HAVE READ AND FULLY UNDERSTAND THE ABOVE CONSENT TO OPERATION and/or OTHER PROCEDURE.     _________________________________________ _________________________________     ___________________________________  Signature of Patient     Signature of Responsible Person                   Printed Name of Responsible Person                              _________________________________________ ______________________________        ___________________________________  Signature of Witness         Date  Time         Relationship to Patient    STATEMENT OF PHYSICIAN My signature below affirms that prior to the time of the procedure; I have explained to the patient and/or his/her legal representative, the risks and benefits involved in the proposed treatment and any reasonable alternative to the proposed treatment.  I have also explained the risks and benefits involved in refusal of the proposed treatment and alternatives to the proposed treatment and have answered the patient's questions.  If I have a significant financial interest in a co-management agreement or a significant financial interest in any product or implant, or other significant relationship used in this procedure/surgery, I have disclosed this and had a discussion with my patient.     _______________________________________________________________ _____________________________  Jose Huynh Physician)                                                                                         (Date)                                   (Time)  Patient Name: Tish Morley    : 1970   Printed: 2023      Medical Record #: [id number]                                              Page 1 of 1